# Patient Record
Sex: FEMALE | Race: WHITE | NOT HISPANIC OR LATINO
[De-identification: names, ages, dates, MRNs, and addresses within clinical notes are randomized per-mention and may not be internally consistent; named-entity substitution may affect disease eponyms.]

---

## 2017-04-24 ENCOUNTER — APPOINTMENT (OUTPATIENT)
Dept: PULMONOLOGY | Facility: CLINIC | Age: 72
End: 2017-04-24

## 2017-04-24 VITALS
SYSTOLIC BLOOD PRESSURE: 130 MMHG | DIASTOLIC BLOOD PRESSURE: 80 MMHG | OXYGEN SATURATION: 98 % | BODY MASS INDEX: 30.9 KG/M2 | HEART RATE: 68 BPM | WEIGHT: 180 LBS

## 2017-08-23 ENCOUNTER — APPOINTMENT (OUTPATIENT)
Dept: PULMONOLOGY | Facility: CLINIC | Age: 72
End: 2017-08-23
Payer: MEDICARE

## 2017-08-23 VITALS
OXYGEN SATURATION: 95 % | SYSTOLIC BLOOD PRESSURE: 130 MMHG | BODY MASS INDEX: 31.07 KG/M2 | WEIGHT: 182 LBS | HEART RATE: 74 BPM | HEIGHT: 64 IN | DIASTOLIC BLOOD PRESSURE: 70 MMHG

## 2017-08-23 PROCEDURE — 99214 OFFICE O/P EST MOD 30 MIN: CPT

## 2017-08-23 RX ORDER — HYDROCODONE BITARTRATE AND ACETAMINOPHEN 5; 325 MG/1; MG/1
5-325 TABLET ORAL
Qty: 90 | Refills: 0 | Status: DISCONTINUED | COMMUNITY
Start: 2017-02-28 | End: 2017-08-23

## 2017-08-23 RX ORDER — NYSTATIN 100000 [USP'U]/ML
100000 SUSPENSION ORAL 3 TIMES DAILY
Qty: 2 | Refills: 2 | Status: DISCONTINUED | COMMUNITY
Start: 2017-04-24 | End: 2017-08-23

## 2017-08-23 RX ORDER — LEVOTHYROXINE SODIUM 0.07 MG/1
75 TABLET ORAL
Qty: 30 | Refills: 0 | Status: DISCONTINUED | COMMUNITY
Start: 2016-12-13 | End: 2017-08-23

## 2017-10-03 ENCOUNTER — TRANSCRIPTION ENCOUNTER (OUTPATIENT)
Age: 72
End: 2017-10-03

## 2017-12-01 ENCOUNTER — EMERGENCY (EMERGENCY)
Facility: HOSPITAL | Age: 72
LOS: 1 days | Discharge: HOME | End: 2017-12-01
Attending: EMERGENCY MEDICINE
Payer: MEDICARE

## 2017-12-01 VITALS
TEMPERATURE: 98 F | SYSTOLIC BLOOD PRESSURE: 136 MMHG | WEIGHT: 173.94 LBS | RESPIRATION RATE: 18 BRPM | HEART RATE: 87 BPM | HEIGHT: 63 IN | DIASTOLIC BLOOD PRESSURE: 94 MMHG | OXYGEN SATURATION: 96 %

## 2017-12-01 VITALS
HEART RATE: 81 BPM | DIASTOLIC BLOOD PRESSURE: 74 MMHG | RESPIRATION RATE: 18 BRPM | SYSTOLIC BLOOD PRESSURE: 124 MMHG | OXYGEN SATURATION: 98 %

## 2017-12-01 LAB
ALBUMIN SERPL ELPH-MCNC: 4.3 G/DL — SIGNIFICANT CHANGE UP (ref 3.3–5.2)
ALP SERPL-CCNC: 72 U/L — SIGNIFICANT CHANGE UP (ref 40–120)
ALT FLD-CCNC: 9 U/L — SIGNIFICANT CHANGE UP
ANION GAP SERPL CALC-SCNC: 16 MMOL/L — SIGNIFICANT CHANGE UP (ref 5–17)
APTT BLD: 29.2 SEC — SIGNIFICANT CHANGE UP (ref 27.5–37.4)
AST SERPL-CCNC: 16 U/L — SIGNIFICANT CHANGE UP
BASOPHILS # BLD AUTO: 0 K/UL — SIGNIFICANT CHANGE UP (ref 0–0.2)
BASOPHILS # BLD AUTO: 0 K/UL — SIGNIFICANT CHANGE UP (ref 0–0.2)
BASOPHILS NFR BLD AUTO: 0.2 % — SIGNIFICANT CHANGE UP (ref 0–2)
BASOPHILS NFR BLD AUTO: 0.3 % — SIGNIFICANT CHANGE UP (ref 0–2)
BILIRUB SERPL-MCNC: 0.6 MG/DL — SIGNIFICANT CHANGE UP (ref 0.4–2)
BLD GP AB SCN SERPL QL: SIGNIFICANT CHANGE UP
BUN SERPL-MCNC: 9 MG/DL — SIGNIFICANT CHANGE UP (ref 8–20)
CALCIUM SERPL-MCNC: 9.6 MG/DL — SIGNIFICANT CHANGE UP (ref 8.6–10.2)
CHLORIDE SERPL-SCNC: 97 MMOL/L — LOW (ref 98–107)
CO2 SERPL-SCNC: 29 MMOL/L — SIGNIFICANT CHANGE UP (ref 22–29)
CREAT SERPL-MCNC: 0.68 MG/DL — SIGNIFICANT CHANGE UP (ref 0.5–1.3)
EOSINOPHIL # BLD AUTO: 0 K/UL — SIGNIFICANT CHANGE UP (ref 0–0.5)
EOSINOPHIL # BLD AUTO: 0 K/UL — SIGNIFICANT CHANGE UP (ref 0–0.5)
EOSINOPHIL NFR BLD AUTO: 0.6 % — SIGNIFICANT CHANGE UP (ref 0–6)
EOSINOPHIL NFR BLD AUTO: 0.8 % — SIGNIFICANT CHANGE UP (ref 0–6)
GLUCOSE SERPL-MCNC: 98 MG/DL — SIGNIFICANT CHANGE UP (ref 70–115)
HCT VFR BLD CALC: 38.4 % — SIGNIFICANT CHANGE UP (ref 37–47)
HCT VFR BLD CALC: 42.2 % — SIGNIFICANT CHANGE UP (ref 37–47)
HGB BLD-MCNC: 12.6 G/DL — SIGNIFICANT CHANGE UP (ref 12–16)
HGB BLD-MCNC: 13.8 G/DL — SIGNIFICANT CHANGE UP (ref 12–16)
INR BLD: 1.08 RATIO — SIGNIFICANT CHANGE UP (ref 0.88–1.16)
LYMPHOCYTES # BLD AUTO: 1.1 K/UL — SIGNIFICANT CHANGE UP (ref 1–4.8)
LYMPHOCYTES # BLD AUTO: 1.3 K/UL — SIGNIFICANT CHANGE UP (ref 1–4.8)
LYMPHOCYTES # BLD AUTO: 17.1 % — LOW (ref 20–55)
LYMPHOCYTES # BLD AUTO: 20.7 % — SIGNIFICANT CHANGE UP (ref 20–55)
MCHC RBC-ENTMCNC: 29.8 PG — SIGNIFICANT CHANGE UP (ref 27–31)
MCHC RBC-ENTMCNC: 29.9 PG — SIGNIFICANT CHANGE UP (ref 27–31)
MCHC RBC-ENTMCNC: 32.7 G/DL — SIGNIFICANT CHANGE UP (ref 32–36)
MCHC RBC-ENTMCNC: 32.8 G/DL — SIGNIFICANT CHANGE UP (ref 32–36)
MCV RBC AUTO: 90.8 FL — SIGNIFICANT CHANGE UP (ref 81–99)
MCV RBC AUTO: 91.5 FL — SIGNIFICANT CHANGE UP (ref 81–99)
MONOCYTES # BLD AUTO: 0.6 K/UL — SIGNIFICANT CHANGE UP (ref 0–0.8)
MONOCYTES # BLD AUTO: 0.8 K/UL — SIGNIFICANT CHANGE UP (ref 0–0.8)
MONOCYTES NFR BLD AUTO: 11.9 % — HIGH (ref 3–10)
MONOCYTES NFR BLD AUTO: 9.9 % — SIGNIFICANT CHANGE UP (ref 3–10)
NEUTROPHILS # BLD AUTO: 4.2 K/UL — SIGNIFICANT CHANGE UP (ref 1.8–8)
NEUTROPHILS # BLD AUTO: 4.6 K/UL — SIGNIFICANT CHANGE UP (ref 1.8–8)
NEUTROPHILS NFR BLD AUTO: 66.4 % — SIGNIFICANT CHANGE UP (ref 37–73)
NEUTROPHILS NFR BLD AUTO: 71.7 % — SIGNIFICANT CHANGE UP (ref 37–73)
OB PNL STL: POSITIVE
PLATELET # BLD AUTO: 132 K/UL — LOW (ref 150–400)
PLATELET # BLD AUTO: 153 K/UL — SIGNIFICANT CHANGE UP (ref 150–400)
POTASSIUM SERPL-MCNC: 3.4 MMOL/L — LOW (ref 3.5–5.3)
POTASSIUM SERPL-SCNC: 3.4 MMOL/L — LOW (ref 3.5–5.3)
PROT SERPL-MCNC: 7.4 G/DL — SIGNIFICANT CHANGE UP (ref 6.6–8.7)
PROTHROM AB SERPL-ACNC: 11.9 SEC — SIGNIFICANT CHANGE UP (ref 9.8–12.7)
RBC # BLD: 4.23 M/UL — LOW (ref 4.4–5.2)
RBC # BLD: 4.61 M/UL — SIGNIFICANT CHANGE UP (ref 4.4–5.2)
RBC # FLD: 14.3 % — SIGNIFICANT CHANGE UP (ref 11–15.6)
RBC # FLD: 14.4 % — SIGNIFICANT CHANGE UP (ref 11–15.6)
SODIUM SERPL-SCNC: 142 MMOL/L — SIGNIFICANT CHANGE UP (ref 135–145)
TYPE + AB SCN PNL BLD: SIGNIFICANT CHANGE UP
WBC # BLD: 6.4 K/UL — SIGNIFICANT CHANGE UP (ref 4.8–10.8)
WBC # BLD: 6.5 K/UL — SIGNIFICANT CHANGE UP (ref 4.8–10.8)
WBC # FLD AUTO: 6.4 K/UL — SIGNIFICANT CHANGE UP (ref 4.8–10.8)
WBC # FLD AUTO: 6.5 K/UL — SIGNIFICANT CHANGE UP (ref 4.8–10.8)

## 2017-12-01 PROCEDURE — 99284 EMERGENCY DEPT VISIT MOD MDM: CPT

## 2017-12-01 PROCEDURE — 82272 OCCULT BLD FECES 1-3 TESTS: CPT

## 2017-12-01 PROCEDURE — 80053 COMPREHEN METABOLIC PANEL: CPT

## 2017-12-01 PROCEDURE — 86900 BLOOD TYPING SEROLOGIC ABO: CPT

## 2017-12-01 PROCEDURE — 99284 EMERGENCY DEPT VISIT MOD MDM: CPT | Mod: 25

## 2017-12-01 PROCEDURE — 74022 RADEX COMPL AQT ABD SERIES: CPT

## 2017-12-01 PROCEDURE — 85730 THROMBOPLASTIN TIME PARTIAL: CPT

## 2017-12-01 PROCEDURE — 85027 COMPLETE CBC AUTOMATED: CPT

## 2017-12-01 PROCEDURE — 74177 CT ABD & PELVIS W/CONTRAST: CPT

## 2017-12-01 PROCEDURE — 36415 COLL VENOUS BLD VENIPUNCTURE: CPT

## 2017-12-01 PROCEDURE — 74022 RADEX COMPL AQT ABD SERIES: CPT | Mod: 26

## 2017-12-01 PROCEDURE — 96374 THER/PROPH/DIAG INJ IV PUSH: CPT | Mod: XU

## 2017-12-01 PROCEDURE — 85610 PROTHROMBIN TIME: CPT

## 2017-12-01 PROCEDURE — 86901 BLOOD TYPING SEROLOGIC RH(D): CPT

## 2017-12-01 PROCEDURE — 74177 CT ABD & PELVIS W/CONTRAST: CPT | Mod: 26

## 2017-12-01 PROCEDURE — 86850 RBC ANTIBODY SCREEN: CPT

## 2017-12-01 RX ORDER — SODIUM CHLORIDE 9 MG/ML
1000 INJECTION INTRAMUSCULAR; INTRAVENOUS; SUBCUTANEOUS ONCE
Qty: 0 | Refills: 0 | Status: COMPLETED | OUTPATIENT
Start: 2017-12-01 | End: 2017-12-01

## 2017-12-01 RX ORDER — ONDANSETRON 8 MG/1
4 TABLET, FILM COATED ORAL ONCE
Qty: 0 | Refills: 0 | Status: COMPLETED | OUTPATIENT
Start: 2017-12-01 | End: 2017-12-01

## 2017-12-01 RX ORDER — ONDANSETRON 8 MG/1
1 TABLET, FILM COATED ORAL
Qty: 15 | Refills: 0 | OUTPATIENT
Start: 2017-12-01 | End: 2017-12-06

## 2017-12-01 RX ADMIN — ONDANSETRON 4 MILLIGRAM(S): 8 TABLET, FILM COATED ORAL at 14:34

## 2017-12-01 RX ADMIN — SODIUM CHLORIDE 1000 MILLILITER(S): 9 INJECTION INTRAMUSCULAR; INTRAVENOUS; SUBCUTANEOUS at 14:34

## 2017-12-01 NOTE — CONSULT NOTE ADULT - SUBJECTIVE AND OBJECTIVE BOX
73 y/o F with 2 day h/o of persistent worsening b/l lower abd pain with multiple episodes vomiting with traces of blood. Pt is unable to tolerate PO intake, last BM was yesterday and family noted red streaks of blood, went to [facility] with +hemeoccult and referred to [facility] for further work up. Pt denies having previous symptoms, but states having 2c, umbilical hernia for many decades which has been asymptomatic, unchanged in size and always reducible. Denies f/c. Voiding and ambulating independently. Last colonoscopy was over 8years ago and was reported to be normal. 71 y/o F with 2 day h/o of persistent worsening b/l lower abd pain with multiple episodes vomiting with traces of blood. Pt is unable to tolerate PO intake, last BM was yesterday and family noted red streaks of blood, [facility]cult and referred to [facility] for further work up. Pt denies having previous symptoms, but states having 2c, umbilical hernia for many decades which has been asymptomatic, unchanged in size and always reducible. Denies f/c. Voiding and ambulating independently. Last colonoscopy was over 8years ago and was reported to be normal.     PMHx: COPD, HTN, HLD, Hypothyroidism, Endometriosis, RLE neuropathic pain, Osteoporosis. PMD Dr. Marcel Rivera, Pulmonlogist Dr. Machado, GYN Dr. Madrid, Neurology Dr. Connie Arteaga  PSHx: open appendectomy with OOphorectomy, Total abdominal hysterectomy, salpingoophorectomy, Csection  Medications: Gabapentin, Percocet, Synthroid, Atorvastatin, VitD, Symbicort, Spiriva, ProAir, Tizanidine  NKDA    Vital Signs Last 24 Hrs  T(C): 36.7 (01 Dec 2017 12:30), Max: 36.7 (01 Dec 2017 12:30)  T(F): 98 (01 Dec 2017 12:30), Max: 98 (01 Dec 2017 12:30)  HR: 82 (01 Dec 2017 18:17) (82 - 87)  BP: 137/77 (01 Dec 2017 18:17) (136/94 - 137/77)  BP(mean): --  RR: 18 (01 Dec 2017 18:17) (18 - 18)  SpO2: 98% (01 Dec 2017 18:17) (96% - 98%)    NAD, AAOX3  Head NCAT, EOMI  Chest expansion symmetric, Lungs CTAB  RRR, S1S2nl  Abd soft, ND, mild lower abd TTP without rebound/rigidity/guarding, 2cm umbilical hernia reducible/NTTP/no skin changes  Ext: No swelling/Edema    CBC Full  -  ( 01 Dec 2017 18:36 )  WBC Count : 6.4 K/uL  Hemoglobin : 12.6 g/dL  Hematocrit : 38.4 %  Platelet Count - Automated : 132 K/uL  Mean Cell Volume : 90.8 fl  Mean Cell Hemoglobin : 29.8 pg  Mean Cell Hemoglobin Concentration : 32.8 g/dL  Auto Neutrophil # : 4.2 K/uL  Auto Lymphocyte # : 1.3 K/uL  Auto Monocyte # : 0.8 K/uL  Auto Eosinophil # : 0.0 K/uL  Auto Basophil # : 0.0 K/uL  Auto Neutrophil % : 66.4 %  Auto Lymphocyte % : 20.7 %  Auto Monocyte % : 11.9 %  Auto Eosinophil % : 0.6 %  Auto Basophil % : 0.2 %    12-01    142  |  97<L>  |  9.0  ----------------------------<  98  3.4<L>   |  29.0  |  0.68    Ca    9.6      01 Dec 2017 15:02    TPro  7.4  /  Alb  4.3  /  TBili  0.6  /  DBili  x   /  AST  16  /  ALT  9   /  AlkPhos  72  12-01    CT A/P: 2cm umbilical hernia with preperitoneal fat, no bowel contained or wall thickening

## 2017-12-01 NOTE — ED STATDOCS - PROGRESS NOTE DETAILS
73 yo F, with hx of COPD, presents to ED from [facility] c/o vomiting x 3 days associated with episodes of hematemesis, low grade fevers, bloating, diarrhea. Pt tested guaiac positive at [facility] and was sent to ED. PSHx includes hysterectomy, , appendectomy, left oophorectomy. PE: diminished breath sounds bilaterally, RRR, no CVAT, abdomen is non distended and dull to percussion, mild LLQ tenderness, no rebound, no rigidity, no guarding. Focused eval, protocol orders entered. Pt to be moved to main ED for complete evaluation by another provider.

## 2017-12-01 NOTE — ED PROVIDER NOTE - PMH
No pertinent past medical history Chronic knee pain    COPD (chronic obstructive pulmonary disease)    Hyperlipidemia    Hypothyroid

## 2017-12-01 NOTE — CONSULT NOTE ADULT - ASSESSMENT
71 y/o F with 2 day h/o of abd pain with BRBPR, enteritis/colits vs occult LGIB of unknown origin  Hemodynamically nl,   -No surgical intervention  -Recommend medicine admission for IV hydration and PO intolerance  -Recommend GI evaluation for GI bleed work up  -Recommend q6hhb  -please contact surgery if pt clinically worsens, hemodynamically compromised, suspicious for acute abdomen 73 y/o F with 2 day h/o of abd pain with BRBPR, enteritis/colits vs occult LGIB of unknown origin, nonincarcerated asymptomatic umbilical hernia  Hemodynamically nl,  -No surgical intervention  -Recommend medicine admission for IV hydration and PO intolerance  -Recommend GI evaluation for GI bleed work up  -Recommend serial hb   -please contact surgery if pt clinically worsens, hemodynamically compromised, suspicious for acute abdomen

## 2017-12-01 NOTE — ED ADULT NURSE NOTE - OBJECTIVE STATEMENT
Assumed patient care at 1430.  PT is awake alert and oriented with family at bedside.  Admits to vomiting and diarrhea (dark red in color), since Tuesday and unable to hold down any food.

## 2017-12-01 NOTE — ED PROVIDER NOTE - MEDICAL DECISION MAKING DETAILS
Pt with no hematemesis or bloody BMs since yesterday. Brown stool. VSS. no coagulopathy. Hgb stable and slight decreased likely due to liquids. Abd soft. Surgery has cleared from hernia perspective. Discussed case with GI Dr. lundberg, who agrees that pt is stable for dc and follw up in ffice early next week. Pt well appearing. pt reliable and gave strict return instructions to return for repeated episodes of bloody emesis or BMs. Pt tolerated PO

## 2017-12-01 NOTE — ED PROVIDER NOTE - OBJECTIVE STATEMENT
72 year old female with Ph COPD, HTn, HLD presents with abd pain and bloody BMs. Pt states that the Sx started 3 days ago with b/l lower abdominal bloating sensation. She reports severe nausea and multiple episodes of vomiting brought on by PO intake. She states that 2 of her episodes of vomiting were bloody. In addition, she reports multiple bloody BMs over th course of 2 days, non since 11/29. She denies melena, fevers, chills, dysuria, hematuria, chest pain, SOB. 72 year old female with Ph COPD, HTn, HLD presents with abd pain and bloody BMs. Pt states that the Sx started 3 days ago with b/l lower abdominal bloating sensation. She reports severe nausea and multiple episodes of vomiting brought on by PO intake. She states that 2 of her episodes of vomiting were bloody, one of which . In addition, she reports multiple blood-streaked BMs over th course of 2 days, none since 11/29. She denies melena, fevers, chills, dysuria, hematuria, chest pain, SOB.

## 2017-12-07 ENCOUNTER — APPOINTMENT (OUTPATIENT)
Dept: GASTROENTEROLOGY | Facility: CLINIC | Age: 72
End: 2017-12-07
Payer: MEDICARE

## 2017-12-07 VITALS
WEIGHT: 180 LBS | RESPIRATION RATE: 16 BRPM | HEIGHT: 64 IN | HEART RATE: 78 BPM | DIASTOLIC BLOOD PRESSURE: 83 MMHG | BODY MASS INDEX: 30.73 KG/M2 | SYSTOLIC BLOOD PRESSURE: 131 MMHG

## 2017-12-07 DIAGNOSIS — J44.9 CHRONIC OBSTRUCTIVE PULMONARY DISEASE, UNSPECIFIED: ICD-10-CM

## 2017-12-07 PROCEDURE — 99204 OFFICE O/P NEW MOD 45 MIN: CPT

## 2017-12-16 ENCOUNTER — RX RENEWAL (OUTPATIENT)
Age: 72
End: 2017-12-16

## 2018-01-17 ENCOUNTER — APPOINTMENT (OUTPATIENT)
Dept: PULMONOLOGY | Facility: CLINIC | Age: 73
End: 2018-01-17

## 2018-03-01 ENCOUNTER — APPOINTMENT (OUTPATIENT)
Dept: PULMONOLOGY | Facility: CLINIC | Age: 73
End: 2018-03-01
Payer: MEDICARE

## 2018-03-01 VITALS
DIASTOLIC BLOOD PRESSURE: 80 MMHG | OXYGEN SATURATION: 97 % | SYSTOLIC BLOOD PRESSURE: 130 MMHG | BODY MASS INDEX: 31.58 KG/M2 | HEIGHT: 64 IN | HEART RATE: 82 BPM

## 2018-03-01 VITALS — WEIGHT: 184 LBS | BODY MASS INDEX: 31.58 KG/M2

## 2018-03-01 DIAGNOSIS — Z01.811 ENCOUNTER FOR PREPROCEDURAL RESPIRATORY EXAMINATION: ICD-10-CM

## 2018-03-01 PROCEDURE — 94010 BREATHING CAPACITY TEST: CPT

## 2018-03-01 PROCEDURE — 99214 OFFICE O/P EST MOD 30 MIN: CPT | Mod: 25

## 2018-03-01 RX ORDER — BUDESONIDE AND FORMOTEROL FUMARATE DIHYDRATE 160; 4.5 UG/1; UG/1
160-4.5 AEROSOL RESPIRATORY (INHALATION) TWICE DAILY
Qty: 1 | Refills: 0 | Status: DISCONTINUED | COMMUNITY
Start: 2017-08-23 | End: 2018-03-01

## 2018-04-16 ENCOUNTER — APPOINTMENT (OUTPATIENT)
Dept: GASTROENTEROLOGY | Facility: GI CENTER | Age: 73
End: 2018-04-16
Payer: MEDICARE

## 2018-04-16 ENCOUNTER — OUTPATIENT (OUTPATIENT)
Dept: OUTPATIENT SERVICES | Facility: HOSPITAL | Age: 73
LOS: 1 days | End: 2018-04-16
Payer: MEDICARE

## 2018-04-16 ENCOUNTER — RESULT REVIEW (OUTPATIENT)
Age: 73
End: 2018-04-16

## 2018-04-16 VITALS
SYSTOLIC BLOOD PRESSURE: 130 MMHG | HEIGHT: 64 IN | TEMPERATURE: 98 F | DIASTOLIC BLOOD PRESSURE: 80 MMHG | BODY MASS INDEX: 31.41 KG/M2 | HEART RATE: 80 BPM | RESPIRATION RATE: 12 BRPM | WEIGHT: 184 LBS

## 2018-04-16 DIAGNOSIS — D17.9 BENIGN LIPOMATOUS NEOPLASM, UNSPECIFIED: ICD-10-CM

## 2018-04-16 DIAGNOSIS — K64.8 OTHER HEMORRHOIDS: ICD-10-CM

## 2018-04-16 DIAGNOSIS — K62.5 HEMORRHAGE OF ANUS AND RECTUM: ICD-10-CM

## 2018-04-16 PROCEDURE — 45380 COLONOSCOPY AND BIOPSY: CPT

## 2018-04-16 PROCEDURE — 88305 TISSUE EXAM BY PATHOLOGIST: CPT | Mod: 26

## 2018-04-16 PROCEDURE — 88305 TISSUE EXAM BY PATHOLOGIST: CPT

## 2018-04-17 ENCOUNTER — RESULT REVIEW (OUTPATIENT)
Age: 73
End: 2018-04-17

## 2018-04-18 LAB — SURGICAL PATHOLOGY FINAL REPORT - CH: SIGNIFICANT CHANGE UP

## 2018-07-27 ENCOUNTER — RESULT REVIEW (OUTPATIENT)
Age: 73
End: 2018-07-27

## 2018-07-27 DIAGNOSIS — Z03.89 ENCOUNTER FOR OBSERVATION FOR OTHER SUSPECTED DISEASES AND CONDITIONS RULED OUT: ICD-10-CM

## 2018-07-31 PROBLEM — E78.5 HYPERLIPIDEMIA, UNSPECIFIED: Chronic | Status: ACTIVE | Noted: 2017-12-01

## 2018-07-31 PROBLEM — J44.9 CHRONIC OBSTRUCTIVE PULMONARY DISEASE, UNSPECIFIED: Chronic | Status: ACTIVE | Noted: 2017-12-01

## 2018-07-31 PROBLEM — M25.569 PAIN IN UNSPECIFIED KNEE: Chronic | Status: ACTIVE | Noted: 2017-12-01

## 2018-07-31 PROBLEM — E03.9 HYPOTHYROIDISM, UNSPECIFIED: Chronic | Status: ACTIVE | Noted: 2017-12-01

## 2018-08-01 ENCOUNTER — FORM ENCOUNTER (OUTPATIENT)
Age: 73
End: 2018-08-01

## 2018-08-02 ENCOUNTER — APPOINTMENT (OUTPATIENT)
Dept: NUCLEAR MEDICINE | Facility: CLINIC | Age: 73
End: 2018-08-02
Payer: MEDICARE

## 2018-08-02 ENCOUNTER — OUTPATIENT (OUTPATIENT)
Dept: OUTPATIENT SERVICES | Facility: HOSPITAL | Age: 73
LOS: 1 days | End: 2018-08-02

## 2018-08-02 DIAGNOSIS — Z03.89 ENCOUNTER FOR OBSERVATION FOR OTHER SUSPECTED DISEASES AND CONDITIONS RULED OUT: ICD-10-CM

## 2018-08-02 DIAGNOSIS — R91.1 SOLITARY PULMONARY NODULE: ICD-10-CM

## 2018-08-02 PROCEDURE — 78815 PET IMAGE W/CT SKULL-THIGH: CPT | Mod: 26,PI

## 2018-08-06 ENCOUNTER — APPOINTMENT (OUTPATIENT)
Dept: THORACIC SURGERY | Facility: CLINIC | Age: 73
End: 2018-08-06
Payer: MEDICARE

## 2018-08-06 VITALS
OXYGEN SATURATION: 94 % | HEIGHT: 64 IN | RESPIRATION RATE: 16 BRPM | SYSTOLIC BLOOD PRESSURE: 135 MMHG | WEIGHT: 184 LBS | HEART RATE: 88 BPM | BODY MASS INDEX: 31.41 KG/M2 | DIASTOLIC BLOOD PRESSURE: 81 MMHG

## 2018-08-06 PROCEDURE — 99205 OFFICE O/P NEW HI 60 MIN: CPT

## 2018-08-06 RX ORDER — SULFAMETHOXAZOLE AND TRIMETHOPRIM 800; 160 MG/1; MG/1
800-160 TABLET ORAL
Qty: 20 | Refills: 0 | Status: DISCONTINUED | COMMUNITY
Start: 2018-06-04

## 2018-08-06 RX ORDER — MUPIROCIN 20 MG/G
2 OINTMENT TOPICAL
Qty: 66 | Refills: 0 | Status: DISCONTINUED | COMMUNITY
Start: 2018-06-04

## 2018-08-06 RX ORDER — FLUTICASONE FUROATE AND VILANTEROL TRIFENATATE 200; 25 UG/1; UG/1
200-25 POWDER RESPIRATORY (INHALATION) DAILY
Qty: 1 | Refills: 5 | Status: DISCONTINUED | COMMUNITY
Start: 2017-04-24 | End: 2018-08-06

## 2018-08-06 RX ORDER — TIZANIDINE 2 MG/1
2 TABLET ORAL
Qty: 60 | Refills: 0 | Status: DISCONTINUED | COMMUNITY
Start: 2018-04-25

## 2018-08-24 ENCOUNTER — OUTPATIENT (OUTPATIENT)
Dept: OUTPATIENT SERVICES | Facility: HOSPITAL | Age: 73
LOS: 1 days | End: 2018-08-24
Payer: MEDICARE

## 2018-08-24 VITALS
RESPIRATION RATE: 16 BRPM | TEMPERATURE: 98 F | HEIGHT: 63 IN | WEIGHT: 176.37 LBS | DIASTOLIC BLOOD PRESSURE: 74 MMHG | SYSTOLIC BLOOD PRESSURE: 130 MMHG | HEART RATE: 86 BPM

## 2018-08-24 DIAGNOSIS — Z29.9 ENCOUNTER FOR PROPHYLACTIC MEASURES, UNSPECIFIED: ICD-10-CM

## 2018-08-24 DIAGNOSIS — Z98.891 HISTORY OF UTERINE SCAR FROM PREVIOUS SURGERY: Chronic | ICD-10-CM

## 2018-08-24 DIAGNOSIS — Z01.811 ENCOUNTER FOR PREPROCEDURAL RESPIRATORY EXAMINATION: ICD-10-CM

## 2018-08-24 DIAGNOSIS — J44.9 CHRONIC OBSTRUCTIVE PULMONARY DISEASE, UNSPECIFIED: ICD-10-CM

## 2018-08-24 DIAGNOSIS — Z90.710 ACQUIRED ABSENCE OF BOTH CERVIX AND UTERUS: Chronic | ICD-10-CM

## 2018-08-24 DIAGNOSIS — R91.1 SOLITARY PULMONARY NODULE: ICD-10-CM

## 2018-08-24 LAB
ALBUMIN SERPL ELPH-MCNC: 4.7 G/DL — SIGNIFICANT CHANGE UP (ref 3.3–5.2)
ALP SERPL-CCNC: 79 U/L — SIGNIFICANT CHANGE UP (ref 40–120)
ALT FLD-CCNC: 10 U/L — SIGNIFICANT CHANGE UP
ANION GAP SERPL CALC-SCNC: 14 MMOL/L — SIGNIFICANT CHANGE UP (ref 5–17)
APTT BLD: 30 SEC — SIGNIFICANT CHANGE UP (ref 27.5–37.4)
AST SERPL-CCNC: 14 U/L — SIGNIFICANT CHANGE UP
BASOPHILS # BLD AUTO: 0 K/UL — SIGNIFICANT CHANGE UP (ref 0–0.2)
BASOPHILS NFR BLD AUTO: 0.1 % — SIGNIFICANT CHANGE UP (ref 0–2)
BILIRUB SERPL-MCNC: 0.4 MG/DL — SIGNIFICANT CHANGE UP (ref 0.4–2)
BLD GP AB SCN SERPL QL: SIGNIFICANT CHANGE UP
BUN SERPL-MCNC: 10 MG/DL — SIGNIFICANT CHANGE UP (ref 8–20)
CALCIUM SERPL-MCNC: 9.9 MG/DL — SIGNIFICANT CHANGE UP (ref 8.6–10.2)
CHLORIDE SERPL-SCNC: 99 MMOL/L — SIGNIFICANT CHANGE UP (ref 98–107)
CO2 SERPL-SCNC: 28 MMOL/L — SIGNIFICANT CHANGE UP (ref 22–29)
CREAT SERPL-MCNC: 0.75 MG/DL — SIGNIFICANT CHANGE UP (ref 0.5–1.3)
EOSINOPHIL # BLD AUTO: 0.2 K/UL — SIGNIFICANT CHANGE UP (ref 0–0.5)
EOSINOPHIL NFR BLD AUTO: 2.3 % — SIGNIFICANT CHANGE UP (ref 0–6)
GLUCOSE SERPL-MCNC: 113 MG/DL — SIGNIFICANT CHANGE UP (ref 70–115)
HCT VFR BLD CALC: 41.2 % — SIGNIFICANT CHANGE UP (ref 37–47)
HGB BLD-MCNC: 13.3 G/DL — SIGNIFICANT CHANGE UP (ref 12–16)
INR BLD: 1.02 RATIO — SIGNIFICANT CHANGE UP (ref 0.88–1.16)
LYMPHOCYTES # BLD AUTO: 1.1 K/UL — SIGNIFICANT CHANGE UP (ref 1–4.8)
LYMPHOCYTES # BLD AUTO: 16.4 % — LOW (ref 20–55)
MCHC RBC-ENTMCNC: 29.8 PG — SIGNIFICANT CHANGE UP (ref 27–31)
MCHC RBC-ENTMCNC: 32.3 G/DL — SIGNIFICANT CHANGE UP (ref 32–36)
MCV RBC AUTO: 92.2 FL — SIGNIFICANT CHANGE UP (ref 81–99)
MONOCYTES # BLD AUTO: 0.4 K/UL — SIGNIFICANT CHANGE UP (ref 0–0.8)
MONOCYTES NFR BLD AUTO: 6.3 % — SIGNIFICANT CHANGE UP (ref 3–10)
NEUTROPHILS # BLD AUTO: 5.2 K/UL — SIGNIFICANT CHANGE UP (ref 1.8–8)
NEUTROPHILS NFR BLD AUTO: 74.6 % — HIGH (ref 37–73)
PLATELET # BLD AUTO: 168 K/UL — SIGNIFICANT CHANGE UP (ref 150–400)
POTASSIUM SERPL-MCNC: 4.5 MMOL/L — SIGNIFICANT CHANGE UP (ref 3.5–5.3)
POTASSIUM SERPL-SCNC: 4.5 MMOL/L — SIGNIFICANT CHANGE UP (ref 3.5–5.3)
PROT SERPL-MCNC: 7.5 G/DL — SIGNIFICANT CHANGE UP (ref 6.6–8.7)
PROTHROM AB SERPL-ACNC: 11.2 SEC — SIGNIFICANT CHANGE UP (ref 9.8–12.7)
RBC # BLD: 4.47 M/UL — SIGNIFICANT CHANGE UP (ref 4.4–5.2)
RBC # FLD: 14.5 % — SIGNIFICANT CHANGE UP (ref 11–15.6)
SODIUM SERPL-SCNC: 141 MMOL/L — SIGNIFICANT CHANGE UP (ref 135–145)
TYPE + AB SCN PNL BLD: SIGNIFICANT CHANGE UP
WBC # BLD: 7 K/UL — SIGNIFICANT CHANGE UP (ref 4.8–10.8)
WBC # FLD AUTO: 7 K/UL — SIGNIFICANT CHANGE UP (ref 4.8–10.8)

## 2018-08-24 PROCEDURE — 93010 ELECTROCARDIOGRAM REPORT: CPT

## 2018-08-24 RX ORDER — CEFAZOLIN SODIUM 1 G
2000 VIAL (EA) INJECTION ONCE
Qty: 0 | Refills: 0 | Status: DISCONTINUED | OUTPATIENT
Start: 2018-09-07 | End: 2018-09-08

## 2018-08-24 RX ORDER — TIZANIDINE 4 MG/1
0 TABLET ORAL
Qty: 0 | Refills: 0 | COMMUNITY

## 2018-08-24 RX ORDER — LEVOTHYROXINE SODIUM 125 MCG
0 TABLET ORAL
Qty: 0 | Refills: 0 | COMMUNITY

## 2018-08-24 RX ORDER — TIOTROPIUM BROMIDE 18 UG/1
0 CAPSULE ORAL; RESPIRATORY (INHALATION)
Qty: 0 | Refills: 0 | COMMUNITY

## 2018-08-24 RX ORDER — BUDESONIDE AND FORMOTEROL FUMARATE DIHYDRATE 160; 4.5 UG/1; UG/1
0 AEROSOL RESPIRATORY (INHALATION)
Qty: 0 | Refills: 0 | COMMUNITY

## 2018-08-24 RX ORDER — GABAPENTIN 400 MG/1
0 CAPSULE ORAL
Qty: 0 | Refills: 0 | COMMUNITY

## 2018-08-24 RX ORDER — ATORVASTATIN CALCIUM 80 MG/1
0 TABLET, FILM COATED ORAL
Qty: 0 | Refills: 0 | COMMUNITY

## 2018-08-24 NOTE — H&P PST ADULT - NSANTHOSAYNRD_GEN_A_CORE
No. JORDON screening performed.  STOP BANG Legend: 0-2 = LOW Risk; 3-4 = INTERMEDIATE Risk; 5-8 = HIGH Risk

## 2018-08-24 NOTE — H&P PST ADULT - ASSESSMENT
73 yo female right lung nodule.    CAPRINI SCORE [CLOT]    AGE RELATED RISK FACTORS                                                       MOBILITY RELATED FACTORS  [ ] Age 41-60 years                                            (1 Point)                  [ ] Bed rest                                                        (1 Point)  [ x ] Age: 61-74 years                                           (2 Points)                 [ ] Plaster cast                                                   (2 Points)  [ ] Age= 75 years                                              (3 Points)                 [ ] Bed bound for more than 72 hours                 (2 Points)    DISEASE RELATED RISK FACTORS                                               GENDER SPECIFIC FACTORS  [ ] Edema in the lower extremities                       (1 Point)                  [ ] Pregnancy                                                     (1 Point)  [ ] Varicose veins                                               (1 Point)                  [ ] Post-partum < 6 weeks                                   (1 Point)             [1 ] BMI > 25 Kg/m2                                            (1 Point)                  [ ] Hormonal therapy  or oral contraception          (1 Point)                 [ ] Sepsis (in the previous month)                        (1 Point)                  [ ] History of pregnancy complications                 (1 point)  [ ] Pneumonia or serious lung disease                                               [ ] Unexplained or recurrent                     (1 Point)           (in the previous month)                               (1 Point)  [ x ] Abnormal pulmonary function test                     (1 Point)                 SURGERY RELATED RISK FACTORS  [ ] Acute myocardial infarction                              (1 Point)                 [ ]  Section                                             (1 Point)  [ ] Congestive heart failure (in the previous month)  (1 Point)               [ ] Minor surgery                                                  (1 Point)   [ ] Inflammatory bowel disease                             (1 Point)                 [ ] Arthroscopic surgery                                        (2 Points)  [ ] Central venous access                                      (2 Points)                [ x ] General surgery lasting more than 45 minutes   (2 Points)       [ ] Stroke (in the previous month)                          (5 Points)               [ ] Elective arthroplasty                                         (5 Points)                                                                                                                                               HEMATOLOGY RELATED FACTORS                                                 TRAUMA RELATED RISK FACTORS  [ ] Prior episodes of VTE                                     (3 Points)                 [ ] Fracture of the hip, pelvis, or leg                       (5 Points)  [ ] Positive family history for VTE                         (3 Points)                 [ ] Acute spinal cord injury (in the previous month)  (5 Points)  [ ] Prothrombin 44309 A                                     (3 Points)                 [ ] Paralysis  (less than 1 month)                             (5 Points)  [ ] Factor V Leiden                                             (3 Points)                  [ ] Multiple Trauma within 1 month                        (5 Points)  [ ] Lupus anticoagulants                                     (3 Points)                                                           [ ] Anticardiolipin antibodies                               (3 Points)                                                       [ ] High homocysteine in the blood                      (3 Points)                                             [ ] Other congenital or acquired thrombophilia      (3 Points)                                                [ ] Heparin induced thrombocytopenia                  (3 Points)                                          Total Score [  5     ]

## 2018-08-28 ENCOUNTER — RX RENEWAL (OUTPATIENT)
Age: 73
End: 2018-08-28

## 2018-08-28 ENCOUNTER — APPOINTMENT (OUTPATIENT)
Dept: CARDIOLOGY | Facility: CLINIC | Age: 73
End: 2018-08-28
Payer: MEDICARE

## 2018-08-28 ENCOUNTER — NON-APPOINTMENT (OUTPATIENT)
Age: 73
End: 2018-08-28

## 2018-08-28 VITALS
WEIGHT: 178 LBS | HEART RATE: 75 BPM | HEIGHT: 64 IN | DIASTOLIC BLOOD PRESSURE: 76 MMHG | BODY MASS INDEX: 30.39 KG/M2 | SYSTOLIC BLOOD PRESSURE: 124 MMHG | OXYGEN SATURATION: 90 %

## 2018-08-28 DIAGNOSIS — Z01.810 ENCOUNTER FOR PREPROCEDURAL CARDIOVASCULAR EXAMINATION: ICD-10-CM

## 2018-08-28 PROCEDURE — 93306 TTE W/DOPPLER COMPLETE: CPT

## 2018-08-28 PROCEDURE — 99205 OFFICE O/P NEW HI 60 MIN: CPT

## 2018-08-28 PROCEDURE — 93000 ELECTROCARDIOGRAM COMPLETE: CPT

## 2018-08-28 RX ORDER — HYDROCORTISONE ACETATE 25 MG/1
25 SUPPOSITORY RECTAL
Qty: 12 | Refills: 5 | Status: DISCONTINUED | COMMUNITY
Start: 2018-04-16 | End: 2018-08-28

## 2018-09-06 ENCOUNTER — APPOINTMENT (OUTPATIENT)
Dept: PULMONOLOGY | Facility: CLINIC | Age: 73
End: 2018-09-06

## 2018-09-06 ENCOUNTER — TRANSCRIPTION ENCOUNTER (OUTPATIENT)
Age: 73
End: 2018-09-06

## 2018-09-07 ENCOUNTER — INPATIENT (INPATIENT)
Facility: HOSPITAL | Age: 73
LOS: 0 days | Discharge: HOME | DRG: 165 | End: 2018-09-08
Attending: THORACIC SURGERY (CARDIOTHORACIC VASCULAR SURGERY) | Admitting: THORACIC SURGERY (CARDIOTHORACIC VASCULAR SURGERY)
Payer: MEDICARE

## 2018-09-07 ENCOUNTER — RESULT REVIEW (OUTPATIENT)
Age: 73
End: 2018-09-07

## 2018-09-07 ENCOUNTER — APPOINTMENT (OUTPATIENT)
Dept: THORACIC SURGERY | Facility: HOSPITAL | Age: 73
End: 2018-09-07

## 2018-09-07 VITALS
HEART RATE: 70 BPM | RESPIRATION RATE: 16 BRPM | SYSTOLIC BLOOD PRESSURE: 152 MMHG | HEIGHT: 63 IN | OXYGEN SATURATION: 97 % | TEMPERATURE: 98 F | WEIGHT: 182.1 LBS | DIASTOLIC BLOOD PRESSURE: 77 MMHG

## 2018-09-07 DIAGNOSIS — Z98.891 HISTORY OF UTERINE SCAR FROM PREVIOUS SURGERY: Chronic | ICD-10-CM

## 2018-09-07 DIAGNOSIS — Z90.710 ACQUIRED ABSENCE OF BOTH CERVIX AND UTERUS: Chronic | ICD-10-CM

## 2018-09-07 DIAGNOSIS — R91.1 SOLITARY PULMONARY NODULE: ICD-10-CM

## 2018-09-07 PROCEDURE — 32669 THORACOSCOPY REMOVE SEGMENT: CPT

## 2018-09-07 PROCEDURE — 85730 THROMBOPLASTIN TIME PARTIAL: CPT

## 2018-09-07 PROCEDURE — 80053 COMPREHEN METABOLIC PANEL: CPT

## 2018-09-07 PROCEDURE — 86923 COMPATIBILITY TEST ELECTRIC: CPT

## 2018-09-07 PROCEDURE — 32674 THORACOSCOPY LYMPH NODE EXC: CPT

## 2018-09-07 PROCEDURE — 86901 BLOOD TYPING SEROLOGIC RH(D): CPT

## 2018-09-07 PROCEDURE — 88342 IMHCHEM/IMCYTCHM 1ST ANTB: CPT | Mod: 26

## 2018-09-07 PROCEDURE — 86900 BLOOD TYPING SEROLOGIC ABO: CPT

## 2018-09-07 PROCEDURE — G0463: CPT

## 2018-09-07 PROCEDURE — 31622 DX BRONCHOSCOPE/WASH: CPT

## 2018-09-07 PROCEDURE — 88307 TISSUE EXAM BY PATHOLOGIST: CPT | Mod: 26

## 2018-09-07 PROCEDURE — 88331 PATH CONSLTJ SURG 1 BLK 1SPC: CPT | Mod: 26

## 2018-09-07 PROCEDURE — 88341 IMHCHEM/IMCYTCHM EA ADD ANTB: CPT | Mod: 26

## 2018-09-07 PROCEDURE — 36415 COLL VENOUS BLD VENIPUNCTURE: CPT

## 2018-09-07 PROCEDURE — 88305 TISSUE EXAM BY PATHOLOGIST: CPT | Mod: 26

## 2018-09-07 PROCEDURE — 85027 COMPLETE CBC AUTOMATED: CPT

## 2018-09-07 PROCEDURE — 71045 X-RAY EXAM CHEST 1 VIEW: CPT | Mod: 26

## 2018-09-07 PROCEDURE — 85610 PROTHROMBIN TIME: CPT

## 2018-09-07 PROCEDURE — 86850 RBC ANTIBODY SCREEN: CPT

## 2018-09-07 RX ORDER — MUPIROCIN 20 MG/G
1 OINTMENT TOPICAL THREE TIMES A DAY
Qty: 0 | Refills: 0 | Status: DISCONTINUED | OUTPATIENT
Start: 2018-09-07 | End: 2018-09-08

## 2018-09-07 RX ORDER — ONDANSETRON 8 MG/1
4 TABLET, FILM COATED ORAL ONCE
Qty: 0 | Refills: 0 | Status: DISCONTINUED | OUTPATIENT
Start: 2018-09-07 | End: 2018-09-07

## 2018-09-07 RX ORDER — FENTANYL CITRATE 50 UG/ML
30 INJECTION INTRAVENOUS
Qty: 0 | Refills: 0 | Status: DISCONTINUED | OUTPATIENT
Start: 2018-09-07 | End: 2018-09-08

## 2018-09-07 RX ORDER — SODIUM CHLORIDE 9 MG/ML
3 INJECTION INTRAMUSCULAR; INTRAVENOUS; SUBCUTANEOUS EVERY 8 HOURS
Qty: 0 | Refills: 0 | Status: DISCONTINUED | OUTPATIENT
Start: 2018-09-07 | End: 2018-09-07

## 2018-09-07 RX ORDER — ALBUTEROL 90 UG/1
2 AEROSOL, METERED ORAL EVERY 6 HOURS
Qty: 0 | Refills: 0 | Status: DISCONTINUED | OUTPATIENT
Start: 2018-09-07 | End: 2018-09-08

## 2018-09-07 RX ORDER — SENNA PLUS 8.6 MG/1
2 TABLET ORAL AT BEDTIME
Qty: 0 | Refills: 0 | Status: DISCONTINUED | OUTPATIENT
Start: 2018-09-07 | End: 2018-09-08

## 2018-09-07 RX ORDER — KETOROLAC TROMETHAMINE 30 MG/ML
15 SYRINGE (ML) INJECTION EVERY 6 HOURS
Qty: 0 | Refills: 0 | Status: DISCONTINUED | OUTPATIENT
Start: 2018-09-07 | End: 2018-09-08

## 2018-09-07 RX ORDER — ALBUTEROL 90 UG/1
2.5 AEROSOL, METERED ORAL EVERY 6 HOURS
Qty: 0 | Refills: 0 | Status: DISCONTINUED | OUTPATIENT
Start: 2018-09-07 | End: 2018-09-08

## 2018-09-07 RX ORDER — LEVOTHYROXINE SODIUM 125 MCG
75 TABLET ORAL DAILY
Qty: 0 | Refills: 0 | Status: DISCONTINUED | OUTPATIENT
Start: 2018-09-07 | End: 2018-09-08

## 2018-09-07 RX ORDER — ACETAMINOPHEN 500 MG
650 TABLET ORAL EVERY 6 HOURS
Qty: 0 | Refills: 0 | Status: DISCONTINUED | OUTPATIENT
Start: 2018-09-07 | End: 2018-09-08

## 2018-09-07 RX ORDER — SODIUM CHLORIDE 9 MG/ML
1000 INJECTION, SOLUTION INTRAVENOUS
Qty: 0 | Refills: 0 | Status: DISCONTINUED | OUTPATIENT
Start: 2018-09-07 | End: 2018-09-07

## 2018-09-07 RX ORDER — ENOXAPARIN SODIUM 100 MG/ML
40 INJECTION SUBCUTANEOUS EVERY 24 HOURS
Qty: 0 | Refills: 0 | Status: DISCONTINUED | OUTPATIENT
Start: 2018-09-08 | End: 2018-09-08

## 2018-09-07 RX ORDER — ATORVASTATIN CALCIUM 80 MG/1
20 TABLET, FILM COATED ORAL AT BEDTIME
Qty: 0 | Refills: 0 | Status: DISCONTINUED | OUTPATIENT
Start: 2018-09-07 | End: 2018-09-08

## 2018-09-07 RX ORDER — SODIUM CHLORIDE 9 MG/ML
1000 INJECTION INTRAMUSCULAR; INTRAVENOUS; SUBCUTANEOUS
Qty: 0 | Refills: 0 | Status: DISCONTINUED | OUTPATIENT
Start: 2018-09-07 | End: 2018-09-08

## 2018-09-07 RX ORDER — IPRATROPIUM BROMIDE 0.2 MG/ML
500 SOLUTION, NON-ORAL INHALATION EVERY 6 HOURS
Qty: 0 | Refills: 0 | Status: DISCONTINUED | OUTPATIENT
Start: 2018-09-07 | End: 2018-09-08

## 2018-09-07 RX ORDER — FAMOTIDINE 10 MG/ML
20 INJECTION INTRAVENOUS EVERY 12 HOURS
Qty: 0 | Refills: 0 | Status: DISCONTINUED | OUTPATIENT
Start: 2018-09-07 | End: 2018-09-08

## 2018-09-07 RX ORDER — FENTANYL CITRATE 50 UG/ML
25 INJECTION INTRAVENOUS
Qty: 0 | Refills: 0 | Status: DISCONTINUED | OUTPATIENT
Start: 2018-09-07 | End: 2018-09-07

## 2018-09-07 RX ORDER — GABAPENTIN 400 MG/1
300 CAPSULE ORAL DAILY
Qty: 0 | Refills: 0 | Status: DISCONTINUED | OUTPATIENT
Start: 2018-09-07 | End: 2018-09-08

## 2018-09-07 RX ORDER — DOCUSATE SODIUM 100 MG
100 CAPSULE ORAL THREE TIMES A DAY
Qty: 0 | Refills: 0 | Status: DISCONTINUED | OUTPATIENT
Start: 2018-09-07 | End: 2018-09-08

## 2018-09-07 RX ADMIN — Medication 15 MILLIGRAM(S): at 19:00

## 2018-09-07 RX ADMIN — Medication 15 MILLIGRAM(S): at 18:29

## 2018-09-07 RX ADMIN — ATORVASTATIN CALCIUM 20 MILLIGRAM(S): 80 TABLET, FILM COATED ORAL at 23:06

## 2018-09-07 RX ADMIN — FENTANYL CITRATE 30 MILLILITER(S): 50 INJECTION INTRAVENOUS at 19:37

## 2018-09-07 RX ADMIN — FENTANYL CITRATE 30 MILLILITER(S): 50 INJECTION INTRAVENOUS at 16:20

## 2018-09-07 RX ADMIN — ALBUTEROL 2 PUFF(S): 90 AEROSOL, METERED ORAL at 21:11

## 2018-09-07 RX ADMIN — Medication 100 MILLIGRAM(S): at 23:06

## 2018-09-07 RX ADMIN — FENTANYL CITRATE 30 MILLILITER(S): 50 INJECTION INTRAVENOUS at 17:52

## 2018-09-07 RX ADMIN — SENNA PLUS 2 TABLET(S): 8.6 TABLET ORAL at 23:06

## 2018-09-07 RX ADMIN — MUPIROCIN 1 APPLICATION(S): 20 OINTMENT TOPICAL at 23:13

## 2018-09-07 NOTE — BRIEF OPERATIVE NOTE - PROCEDURE
<<-----Click on this checkbox to enter Procedure Wedge resection of right lung with video-assisted thoracoscopic surgery (VATS)  09/07/2018  Right upper lobe posterior segementectomy  [facility]  Flexible bronchoscopy by cardiothoracic surgery  09/07/2018    Active  [facility]

## 2018-09-07 NOTE — BRIEF OPERATIVE NOTE - PROCEDURE
<<-----Click on this checkbox to enter Procedure CT guided needle localization  09/07/2018  in right lung nodule with methylene blue dye infusion  Active  [id number]

## 2018-09-07 NOTE — BRIEF OPERATIVE NOTE - PRE-OP DX
Lung nodule  09/07/2018    Active  Roni Mclaughlin
Lung nodule  09/07/2018    Active  Roni Mclaughlin

## 2018-09-07 NOTE — BRIEF OPERATIVE NOTE - POST-OP DX
Lung nodule  09/07/2018    Active  Roni Mclaughlin
Adenocarcinoma of right lung  09/07/2018    Active  Katie Garcia  Lung nodule  09/07/2018    Active  Roni Mclaughlin

## 2018-09-07 NOTE — PROGRESS NOTE ADULT - SUBJECTIVE AND OBJECTIVE BOX
POST-OPERATIVE NOTE    PROCEDURE: Flex bronch, VATS with R upper lobe posterior lung resection    INTERVAL HPI: Pain well controlled. Tolerating PO intake. Voiding. Denies N/V/CP/SOB. On 2L NC    Vital Signs Last 24 Hrs  T(C): 36.7 (07 Sep 2018 17:59), Max: 36.7 (07 Sep 2018 11:40)  T(F): 98 (07 Sep 2018 17:59), Max: 98.1 (07 Sep 2018 11:40)  HR: 60 (07 Sep 2018 17:59) (50 - 72)  BP: 143/71 (07 Sep 2018 17:59) (126/97 - 161/83)  BP(mean): --  RR: 18 (07 Sep 2018 17:59) (12 - 18)  SpO2: 92% (07 Sep 2018 17:59) (92% - 100%)    PE  Gen: AAO x3, NAD  Pulm: CTAB, no resp distress. R chest tube in place, on WS  Abd: Soft, NT, ND, -R/-G  Neuro: No focal neurological deficits

## 2018-09-07 NOTE — BRIEF OPERATIVE NOTE - OPERATION/FINDINGS
22 g franseen needle in right lung nodule. 1cc of 50-50 solution of methylene blue and omnipaque 300 contrast injected into nodule.  Miniscule post procedure PTX
1. R lung nodule  2. Adenocarinoma of lung

## 2018-09-08 ENCOUNTER — TRANSCRIPTION ENCOUNTER (OUTPATIENT)
Age: 73
End: 2018-09-08

## 2018-09-08 VITALS
SYSTOLIC BLOOD PRESSURE: 113 MMHG | OXYGEN SATURATION: 97 % | HEART RATE: 75 BPM | DIASTOLIC BLOOD PRESSURE: 69 MMHG | RESPIRATION RATE: 19 BRPM | TEMPERATURE: 98 F

## 2018-09-08 LAB
ANION GAP SERPL CALC-SCNC: 15 MMOL/L — SIGNIFICANT CHANGE UP (ref 5–17)
BASOPHILS # BLD AUTO: 0 K/UL — SIGNIFICANT CHANGE UP (ref 0–0.2)
BASOPHILS NFR BLD AUTO: 0.1 % — SIGNIFICANT CHANGE UP (ref 0–2)
BUN SERPL-MCNC: 16 MG/DL — SIGNIFICANT CHANGE UP (ref 8–20)
CALCIUM SERPL-MCNC: 9.8 MG/DL — SIGNIFICANT CHANGE UP (ref 8.6–10.2)
CHLORIDE SERPL-SCNC: 102 MMOL/L — SIGNIFICANT CHANGE UP (ref 98–107)
CO2 SERPL-SCNC: 25 MMOL/L — SIGNIFICANT CHANGE UP (ref 22–29)
CREAT SERPL-MCNC: 0.66 MG/DL — SIGNIFICANT CHANGE UP (ref 0.5–1.3)
EOSINOPHIL # BLD AUTO: 0 K/UL — SIGNIFICANT CHANGE UP (ref 0–0.5)
EOSINOPHIL NFR BLD AUTO: 0 % — SIGNIFICANT CHANGE UP (ref 0–6)
GLUCOSE SERPL-MCNC: 148 MG/DL — HIGH (ref 70–115)
HCT VFR BLD CALC: 40.2 % — SIGNIFICANT CHANGE UP (ref 37–47)
HGB BLD-MCNC: 12.8 G/DL — SIGNIFICANT CHANGE UP (ref 12–16)
LYMPHOCYTES # BLD AUTO: 0.5 K/UL — LOW (ref 1–4.8)
LYMPHOCYTES # BLD AUTO: 4.1 % — LOW (ref 20–55)
MCHC RBC-ENTMCNC: 29.2 PG — SIGNIFICANT CHANGE UP (ref 27–31)
MCHC RBC-ENTMCNC: 31.8 G/DL — LOW (ref 32–36)
MCV RBC AUTO: 91.8 FL — SIGNIFICANT CHANGE UP (ref 81–99)
MONOCYTES # BLD AUTO: 0.4 K/UL — SIGNIFICANT CHANGE UP (ref 0–0.8)
MONOCYTES NFR BLD AUTO: 3.1 % — SIGNIFICANT CHANGE UP (ref 3–10)
NEUTROPHILS # BLD AUTO: 12 K/UL — HIGH (ref 1.8–8)
NEUTROPHILS NFR BLD AUTO: 92.5 % — HIGH (ref 37–73)
PLATELET # BLD AUTO: 161 K/UL — SIGNIFICANT CHANGE UP (ref 150–400)
POTASSIUM SERPL-MCNC: 4.3 MMOL/L — SIGNIFICANT CHANGE UP (ref 3.5–5.3)
POTASSIUM SERPL-SCNC: 4.3 MMOL/L — SIGNIFICANT CHANGE UP (ref 3.5–5.3)
RBC # BLD: 4.38 M/UL — LOW (ref 4.4–5.2)
RBC # FLD: 14.7 % — SIGNIFICANT CHANGE UP (ref 11–15.6)
SODIUM SERPL-SCNC: 142 MMOL/L — SIGNIFICANT CHANGE UP (ref 135–145)
WBC # BLD: 12.9 K/UL — HIGH (ref 4.8–10.8)
WBC # FLD AUTO: 12.9 K/UL — HIGH (ref 4.8–10.8)

## 2018-09-08 PROCEDURE — 36415 COLL VENOUS BLD VENIPUNCTURE: CPT

## 2018-09-08 PROCEDURE — 88341 IMHCHEM/IMCYTCHM EA ADD ANTB: CPT

## 2018-09-08 PROCEDURE — 88307 TISSUE EXAM BY PATHOLOGIST: CPT

## 2018-09-08 PROCEDURE — 88342 IMHCHEM/IMCYTCHM 1ST ANTB: CPT

## 2018-09-08 PROCEDURE — 71045 X-RAY EXAM CHEST 1 VIEW: CPT | Mod: 26

## 2018-09-08 PROCEDURE — 85027 COMPLETE CBC AUTOMATED: CPT

## 2018-09-08 PROCEDURE — 88331 PATH CONSLTJ SURG 1 BLK 1SPC: CPT

## 2018-09-08 PROCEDURE — 94640 AIRWAY INHALATION TREATMENT: CPT

## 2018-09-08 PROCEDURE — 76000 FLUOROSCOPY <1 HR PHYS/QHP: CPT

## 2018-09-08 PROCEDURE — 71045 X-RAY EXAM CHEST 1 VIEW: CPT

## 2018-09-08 PROCEDURE — 71045 X-RAY EXAM CHEST 1 VIEW: CPT | Mod: 26,77

## 2018-09-08 PROCEDURE — 88305 TISSUE EXAM BY PATHOLOGIST: CPT

## 2018-09-08 PROCEDURE — 80048 BASIC METABOLIC PNL TOTAL CA: CPT

## 2018-09-08 PROCEDURE — 94760 N-INVAS EAR/PLS OXIMETRY 1: CPT

## 2018-09-08 RX ORDER — FAMOTIDINE 10 MG/ML
1 INJECTION INTRAVENOUS
Qty: 14 | Refills: 0
Start: 2018-09-08 | End: 2018-09-14

## 2018-09-08 RX ORDER — ACETAMINOPHEN 500 MG
2 TABLET ORAL
Qty: 0 | Refills: 0 | DISCHARGE
Start: 2018-09-08

## 2018-09-08 RX ORDER — TIZANIDINE 4 MG/1
2 TABLET ORAL
Qty: 0 | Refills: 0 | COMMUNITY

## 2018-09-08 RX ADMIN — Medication 15 MILLIGRAM(S): at 12:00

## 2018-09-08 RX ADMIN — Medication 15 MILLIGRAM(S): at 01:20

## 2018-09-08 RX ADMIN — Medication 100 MILLIGRAM(S): at 06:17

## 2018-09-08 RX ADMIN — Medication 15 MILLIGRAM(S): at 06:22

## 2018-09-08 RX ADMIN — Medication 15 MILLIGRAM(S): at 00:50

## 2018-09-08 RX ADMIN — Medication 15 MILLIGRAM(S): at 11:15

## 2018-09-08 RX ADMIN — FENTANYL CITRATE 30 MILLILITER(S): 50 INJECTION INTRAVENOUS at 07:49

## 2018-09-08 RX ADMIN — Medication 75 MICROGRAM(S): at 06:17

## 2018-09-08 RX ADMIN — ALBUTEROL 2 PUFF(S): 90 AEROSOL, METERED ORAL at 03:00

## 2018-09-08 RX ADMIN — MUPIROCIN 1 APPLICATION(S): 20 OINTMENT TOPICAL at 06:18

## 2018-09-08 RX ADMIN — Medication 100 MILLIGRAM(S): at 14:17

## 2018-09-08 RX ADMIN — ALBUTEROL 2 PUFF(S): 90 AEROSOL, METERED ORAL at 15:14

## 2018-09-08 RX ADMIN — ALBUTEROL 2 PUFF(S): 90 AEROSOL, METERED ORAL at 08:33

## 2018-09-08 RX ADMIN — GABAPENTIN 300 MILLIGRAM(S): 400 CAPSULE ORAL at 11:15

## 2018-09-08 RX ADMIN — Medication 15 MILLIGRAM(S): at 06:52

## 2018-09-08 RX ADMIN — MUPIROCIN 1 APPLICATION(S): 20 OINTMENT TOPICAL at 14:17

## 2018-09-08 RX ADMIN — FAMOTIDINE 20 MILLIGRAM(S): 10 INJECTION INTRAVENOUS at 06:17

## 2018-09-08 NOTE — DISCHARGE NOTE ADULT - CARE PROVIDERS DIRECT ADDRESSES
[email or web address]

## 2018-09-08 NOTE — DISCHARGE NOTE ADULT - CARE PLAN
Principal Discharge DX:	Lung nodule  Goal:	rest and recover  Assessment and plan of treatment:	Please come to ED or Call Cardio thoracic office at [phone] if Chest pain, Shortness of Breath,  Nausea & vomiting, oozing from wounds

## 2018-09-08 NOTE — DISCHARGE NOTE ADULT - PLAN OF CARE
rest and recover Please come to ED or Call Cardio thoracic office at [phone] if Chest pain, Shortness of Breath,  Nausea & vomiting, oozing from wounds

## 2018-09-08 NOTE — DISCHARGE NOTE ADULT - MEDICATION SUMMARY - MEDICATIONS TO TAKE
I will START or STAY ON the medications listed below when I get home from the hospital:    acetaminophen 325 mg oral tablet  -- 2 tab(s) by mouth every 6 hours, As needed, Mild Pain (1 - 3)  -- Indication: For pain    Percocet 5/325 oral tablet  -- 1 tab(s) by mouth every 6 hours MDD:4  -- Caution federal law prohibits the transfer of this drug to any person other  than the person for whom it was prescribed.  May cause drowsiness.  Alcohol may intensify this effect.  Use care when operating dangerous machinery.  This prescription cannot be refilled.  This product contains acetaminophen.  Do not use  with any other product containing acetaminophen to prevent possible liver damage.  Using more of this medication than prescribed may cause serious breathing problems.    -- Indication: For pain    gabapentin enacarbil 300 mg oral tablet, extended release  -- 3 tab(s) by mouth once a day  -- Indication: For pain    Lipitor 20 mg oral tablet  -- 1 tab(s) by mouth once a day  -- Indication: For HLD    Symbicort 160 mcg-4.5 mcg/inh inhalation aerosol  -- 2 puff(s) inhaled 2 times a day  -- Indication: For COPD    ProAir HFA  -- Indication: For COPD    mupirocin 2% topical ointment  -- Apply on skin to affected area 3 times a day, right elbow  -- Indication: For Skin    famotidine 20 mg oral tablet  -- 1 tab(s) by mouth every 12 hours  -- Indication: For GI    Synthroid 75 mcg (0.075 mg) oral tablet  -- 1 tab(s) by mouth once a day  -- Indication: For Hypothyroid

## 2018-09-08 NOTE — DISCHARGE NOTE ADULT - PATIENT PORTAL LINK FT
You can access the [facility] Patient Portal, offered by [facility], by registering with the following website: [email or web address]

## 2018-09-08 NOTE — DISCHARGE NOTE ADULT - ADDITIONAL INSTRUCTIONS
Follow up appointment with Dr Chaudhary in 5-7 days   Cardiac surgery office second floor at [facility]

## 2018-09-08 NOTE — PROGRESS NOTE ADULT - ASSESSMENT
This is a 73 y/o F s/p Rt VATS RUL wedge resection for lung nodule, no post op issues     PLAN  D/C Tube  pain management   d/c home  VICKIE Chaudhary

## 2018-09-08 NOTE — PROGRESS NOTE ADULT - SUBJECTIVE AND OBJECTIVE BOX
Subjective: "   I feel good "  Pt in bed NAD     T(C): 36.9 (09-08-18 @ 11:13), Max: 36.9 (09-07-18 @ 22:30)  HR: 79 (09-08-18 @ 11:13) (50 - 79)  BP: 110/62 (09-08-18 @ 11:13) (110/62 - 161/83)    RR: 18 (09-08-18 @ 11:13) (12 - 18)  SpO2: 94% (09-08-18 @ 11:13) (92% - 100%)  Tele: SR    CHEST TUBE:      Rt                         OUTPUT:   37   per 24 hours    AIR LEAKS:  [ ] YES [ x] NO          09-08    142  |  102  |  16.0  ----------------------------<  148<H>  4.3   |  25.0  |  0.66    Ca    9.8      08 Sep 2018 05:51                                 12.8   12.9  )-----------( 161      ( 08 Sep 2018 05:51 )             40.2                 CAPILLARY BLOOD GLUCOSE               CXR:   < from: Xray Chest 1 View- PORTABLE-Routine (09.08.18 @ 06:41) >  NTERPRETATION:  CLINICAL INFORMATION: Chest tube placement.  TECHNIQUE: Serial single view chest radiographs were obtained.  COMPARISON: December 1, 2017.    EXAMINATION DATE: September 7, 2018 at 4:16 PM.  FINDINGS:  LINES/TUBES: Right apically directed chest tube.  LUNGS/PLEURA: Postsurgical changes in the right upper lung. Probable   small right pneumothorax. No pleural effusion.  MEDIASTINUM: Cardiomediastinal silhouette is unremarkable.  OTHER: None.  IMPRESSION:   Postsurgical changes in the right lung.  Probable small right pneumothorax.    EXAMINATION DATE: September 8, 2018 at 5:26 AM.  FINDINGS:  LINES/TUBES: Unchanged right apically directed chest tube.  LUNGS/PLEURA: Small right pneumothorax, decreased in size. Postsurgical   changes in the right lung. No pleural effusion.  MEDIASTINUM: Cardiomediastinal silhouette is unremarkable.  OTHER: None.  IMPRESSION:   Decreased size of small right pneumothorax.     < end of copied text >        Assessment  Neuro:  Alert Awake NAD   Pulm: decreased at bases b/l   CV: RRR S1 S2   Abd:  soft NT ND + BS   Extremities:  trace edema b/l LE

## 2018-09-17 ENCOUNTER — APPOINTMENT (OUTPATIENT)
Dept: THORACIC SURGERY | Facility: CLINIC | Age: 73
End: 2018-09-17
Payer: MEDICARE

## 2018-09-17 ENCOUNTER — OUTPATIENT (OUTPATIENT)
Dept: OUTPATIENT SERVICES | Facility: HOSPITAL | Age: 73
LOS: 1 days | End: 2018-09-17
Payer: MEDICARE

## 2018-09-17 VITALS
WEIGHT: 168 LBS | HEART RATE: 84 BPM | DIASTOLIC BLOOD PRESSURE: 79 MMHG | SYSTOLIC BLOOD PRESSURE: 123 MMHG | OXYGEN SATURATION: 94 % | BODY MASS INDEX: 28.68 KG/M2 | RESPIRATION RATE: 16 BRPM | HEIGHT: 64 IN

## 2018-09-17 DIAGNOSIS — R22.2 LOCALIZED SWELLING, MASS AND LUMP, TRUNK: ICD-10-CM

## 2018-09-17 DIAGNOSIS — Z90.710 ACQUIRED ABSENCE OF BOTH CERVIX AND UTERUS: Chronic | ICD-10-CM

## 2018-09-17 DIAGNOSIS — Z98.891 HISTORY OF UTERINE SCAR FROM PREVIOUS SURGERY: Chronic | ICD-10-CM

## 2018-09-17 PROCEDURE — 71046 X-RAY EXAM CHEST 2 VIEWS: CPT

## 2018-09-17 PROCEDURE — 71046 X-RAY EXAM CHEST 2 VIEWS: CPT | Mod: 26

## 2018-09-17 PROCEDURE — 99024 POSTOP FOLLOW-UP VISIT: CPT

## 2018-09-17 RX ORDER — FAMOTIDINE 20 MG/1
20 TABLET, FILM COATED ORAL
Qty: 14 | Refills: 0 | Status: DISCONTINUED | COMMUNITY
Start: 2018-09-08

## 2018-09-17 RX ORDER — OXYCODONE AND ACETAMINOPHEN 5; 325 MG/1; MG/1
5-325 TABLET ORAL
Qty: 12 | Refills: 0 | Status: ACTIVE | COMMUNITY
Start: 2018-09-08

## 2018-09-19 LAB — SURGICAL PATHOLOGY FINAL REPORT - CH: SIGNIFICANT CHANGE UP

## 2018-09-26 LAB — PATH REPORT ADDENDUM.SYNOPTIC DOC: SIGNIFICANT CHANGE UP

## 2018-11-28 ENCOUNTER — APPOINTMENT (OUTPATIENT)
Dept: CARDIOLOGY | Facility: CLINIC | Age: 73
End: 2018-11-28
Payer: MEDICARE

## 2018-11-28 ENCOUNTER — NON-APPOINTMENT (OUTPATIENT)
Age: 73
End: 2018-11-28

## 2018-11-28 VITALS
OXYGEN SATURATION: 93 % | HEIGHT: 63 IN | DIASTOLIC BLOOD PRESSURE: 80 MMHG | HEART RATE: 75 BPM | BODY MASS INDEX: 31.54 KG/M2 | SYSTOLIC BLOOD PRESSURE: 132 MMHG | WEIGHT: 178 LBS

## 2018-11-28 PROCEDURE — 93000 ELECTROCARDIOGRAM COMPLETE: CPT

## 2018-11-28 PROCEDURE — 99215 OFFICE O/P EST HI 40 MIN: CPT

## 2018-11-28 NOTE — CARDIOLOGY SUMMARY
[___] : [unfilled] [LVEF ___%] : LVEF [unfilled]% [Normal] : normal LA size [None] : no mitral regurgitation [___] : [unfilled]

## 2018-11-28 NOTE — PHYSICAL EXAM
[General Appearance - Well Developed] : well developed [Normal Appearance] : normal appearance [Well Groomed] : well groomed [General Appearance - Well Nourished] : well nourished [No Deformities] : no deformities [General Appearance - In No Acute Distress] : no acute distress [Normal Conjunctiva] : the conjunctiva exhibited no abnormalities [Eyelids - No Xanthelasma] : the eyelids demonstrated no xanthelasmas [Normal Oral Mucosa] : normal oral mucosa [No Oral Pallor] : no oral pallor [No Oral Cyanosis] : no oral cyanosis [Normal Jugular Venous A Waves Present] : normal jugular venous A waves present [Normal Jugular Venous V Waves Present] : normal jugular venous V waves present [No Jugular Venous Tilley A Waves] : no jugular venous tilley A waves [Respiration, Rhythm And Depth] : normal respiratory rhythm and effort [Exaggerated Use Of Accessory Muscles For Inspiration] : no accessory muscle use [Auscultation Breath Sounds / Voice Sounds] : lungs were clear to auscultation bilaterally [Heart Rate And Rhythm] : heart rate and rhythm were normal [Heart Sounds] : normal S1 and S2 [Murmurs] : no murmurs present [Abdomen Soft] : soft [Abdomen Tenderness] : non-tender [Abdomen Mass (___ Cm)] : no abdominal mass palpated [Nail Clubbing] : no clubbing of the fingernails [Cyanosis, Localized] : no localized cyanosis [Petechial Hemorrhages (___cm)] : no petechial hemorrhages [Skin Color & Pigmentation] : normal skin color and pigmentation [] : no rash [No Venous Stasis] : no venous stasis [Skin Lesions] : no skin lesions [No Skin Ulcers] : no skin ulcer [No Xanthoma] : no  xanthoma was observed [Oriented To Time, Place, And Person] : oriented to person, place, and time [Affect] : the affect was normal [Mood] : the mood was normal [No Anxiety] : not feeling anxious [FreeTextEntry1] : cannot exercise due to knee problems and dyspnea

## 2018-11-28 NOTE — DISCUSSION/SUMMARY
[Patient] : the patient [Guardian] : the guardian [Risks] : risks [Benefits] : benefits [Alternatives] : alternatives [___ Month(s)] : [unfilled] month(s) [FreeTextEntry1] : This is a 72 year old woman with chronic obstructive pulmonary disease and smoking here for Pre-operative cardiovascular risk evaluation and management \par 1) Pre-operative cardiovascular risk evaluation and management : tolerated surgery well.no cardiac complications.\par 2) Dyspnea: stress echo.

## 2018-11-28 NOTE — REASON FOR VISIT
[Follow-Up - Clinic] : a clinic follow-up of [FreeTextEntry2] : Pre-operative cardiovascular risk evaluation and management  and coronary artery disease [id number]  [FreeTextEntry1] : Pre-operative cardiovascular risk evaluation and management and coronary artery disease evaluation

## 2018-11-28 NOTE — HISTORY OF PRESENT ILLNESS
[FreeTextEntry1] : Pre-operative cardiovascular risk evaluation and management  and coronary artery disease evaluation\par Tolerated surgery well. no cardia complications. no headaches. No dizziness \par \par \par \par \par old note: Pre-operative cardiovascular risk evaluation and management \par This is a 72 year old woman with history of chronic obstructive pulmonary disease and smoking here for Pre-operative cardiovascular risk evaluation and management \par She got a routine CT scan of cet that showd lung nodule. \par PET scan showed malignancy\par Now scheduled for resection of the lung nodule (rt upper lung)

## 2018-12-19 ENCOUNTER — APPOINTMENT (OUTPATIENT)
Dept: PULMONOLOGY | Facility: CLINIC | Age: 73
End: 2018-12-19
Payer: MEDICARE

## 2018-12-19 VITALS — SYSTOLIC BLOOD PRESSURE: 122 MMHG | WEIGHT: 176 LBS | BODY MASS INDEX: 31.18 KG/M2 | DIASTOLIC BLOOD PRESSURE: 80 MMHG

## 2018-12-19 VITALS — HEART RATE: 77 BPM | OXYGEN SATURATION: 93 %

## 2018-12-19 DIAGNOSIS — R93.89 ABNORMAL FINDINGS ON DIAGNOSTIC IMAGING OF OTHER SPECIFIED BODY STRUCTURES: ICD-10-CM

## 2018-12-19 PROCEDURE — 99214 OFFICE O/P EST MOD 30 MIN: CPT

## 2018-12-19 RX ORDER — MUPIROCIN 2 %
OINTMENT (GRAM) TOPICAL
Refills: 0 | Status: DISCONTINUED | COMMUNITY
End: 2018-12-19

## 2019-01-02 ENCOUNTER — APPOINTMENT (OUTPATIENT)
Dept: CARDIOLOGY | Facility: CLINIC | Age: 74
End: 2019-01-02
Payer: MEDICARE

## 2019-01-02 PROCEDURE — 0439T MYOCRD CONTRAST PRFUJ ECHO: CPT | Mod: 59

## 2019-01-02 PROCEDURE — 93320 DOPPLER ECHO COMPLETE: CPT

## 2019-01-02 PROCEDURE — 93351 STRESS TTE COMPLETE: CPT

## 2019-01-02 PROCEDURE — 93352 ADMIN ECG CONTRAST AGENT: CPT

## 2019-04-18 ENCOUNTER — APPOINTMENT (OUTPATIENT)
Dept: PULMONOLOGY | Facility: CLINIC | Age: 74
End: 2019-04-18
Payer: MEDICARE

## 2019-04-18 VITALS
HEIGHT: 63 IN | HEART RATE: 73 BPM | DIASTOLIC BLOOD PRESSURE: 70 MMHG | BODY MASS INDEX: 31.01 KG/M2 | WEIGHT: 175 LBS | OXYGEN SATURATION: 93 % | SYSTOLIC BLOOD PRESSURE: 110 MMHG

## 2019-04-18 DIAGNOSIS — R06.00 DYSPNEA, UNSPECIFIED: ICD-10-CM

## 2019-04-18 DIAGNOSIS — R09.02 HYPOXEMIA: ICD-10-CM

## 2019-04-18 PROCEDURE — 94010 BREATHING CAPACITY TEST: CPT

## 2019-04-18 PROCEDURE — 99214 OFFICE O/P EST MOD 30 MIN: CPT | Mod: 25

## 2019-04-22 ENCOUNTER — RX RENEWAL (OUTPATIENT)
Age: 74
End: 2019-04-22

## 2019-04-26 ENCOUNTER — APPOINTMENT (OUTPATIENT)
Dept: PULMONOLOGY | Facility: CLINIC | Age: 74
End: 2019-04-26

## 2019-06-03 ENCOUNTER — TRANSCRIPTION ENCOUNTER (OUTPATIENT)
Age: 74
End: 2019-06-03

## 2019-06-03 ENCOUNTER — RX RENEWAL (OUTPATIENT)
Age: 74
End: 2019-06-03

## 2019-06-17 ENCOUNTER — RX RENEWAL (OUTPATIENT)
Age: 74
End: 2019-06-17

## 2019-06-24 ENCOUNTER — RX RENEWAL (OUTPATIENT)
Age: 74
End: 2019-06-24

## 2019-07-17 ENCOUNTER — APPOINTMENT (OUTPATIENT)
Dept: CARDIOLOGY | Facility: CLINIC | Age: 74
End: 2019-07-17

## 2019-10-04 ENCOUNTER — RX RENEWAL (OUTPATIENT)
Age: 74
End: 2019-10-04

## 2019-10-11 ENCOUNTER — RX RENEWAL (OUTPATIENT)
Age: 74
End: 2019-10-11

## 2019-10-11 RX ORDER — TIOTROPIUM BROMIDE 18 UG/1
18 CAPSULE ORAL; RESPIRATORY (INHALATION)
Qty: 90 | Refills: 1 | Status: ACTIVE | COMMUNITY
Start: 2018-08-28 | End: 1900-01-01

## 2019-10-24 ENCOUNTER — APPOINTMENT (OUTPATIENT)
Dept: PULMONOLOGY | Facility: CLINIC | Age: 74
End: 2019-10-24
Payer: MEDICARE

## 2019-10-24 VITALS
HEIGHT: 63 IN | DIASTOLIC BLOOD PRESSURE: 70 MMHG | OXYGEN SATURATION: 93 % | BODY MASS INDEX: 31.18 KG/M2 | HEART RATE: 95 BPM | SYSTOLIC BLOOD PRESSURE: 130 MMHG | WEIGHT: 176 LBS

## 2019-10-24 DIAGNOSIS — G47.34 IDIOPATHIC SLEEP RELATED NONOBSTRUCTIVE ALVEOLAR HYPOVENTILATION: ICD-10-CM

## 2019-10-24 DIAGNOSIS — Z87.09 PERSONAL HISTORY OF OTHER DISEASES OF THE RESPIRATORY SYSTEM: ICD-10-CM

## 2019-10-24 DIAGNOSIS — R91.1 SOLITARY PULMONARY NODULE: ICD-10-CM

## 2019-10-24 PROCEDURE — 99214 OFFICE O/P EST MOD 30 MIN: CPT

## 2019-11-26 ENCOUNTER — RESULT REVIEW (OUTPATIENT)
Age: 74
End: 2019-11-26

## 2020-02-19 ENCOUNTER — APPOINTMENT (OUTPATIENT)
Dept: PULMONOLOGY | Facility: CLINIC | Age: 75
End: 2020-02-19
Payer: MEDICARE

## 2020-02-19 VITALS
OXYGEN SATURATION: 95 % | HEART RATE: 82 BPM | BODY MASS INDEX: 31.69 KG/M2 | RESPIRATION RATE: 16 BRPM | DIASTOLIC BLOOD PRESSURE: 82 MMHG | WEIGHT: 170 LBS | HEIGHT: 61.5 IN | SYSTOLIC BLOOD PRESSURE: 128 MMHG

## 2020-02-19 DIAGNOSIS — J44.9 CHRONIC OBSTRUCTIVE PULMONARY DISEASE, UNSPECIFIED: ICD-10-CM

## 2020-02-19 DIAGNOSIS — R05 COUGH: ICD-10-CM

## 2020-02-19 DIAGNOSIS — C34.91 MALIGNANT NEOPLASM OF UNSPECIFIED PART OF RIGHT BRONCHUS OR LUNG: ICD-10-CM

## 2020-02-19 DIAGNOSIS — Z87.891 PERSONAL HISTORY OF NICOTINE DEPENDENCE: ICD-10-CM

## 2020-02-19 PROCEDURE — 99214 OFFICE O/P EST MOD 30 MIN: CPT

## 2020-02-19 RX ORDER — PREDNISONE 10 MG/1
10 TABLET ORAL
Qty: 21 | Refills: 3 | Status: DISCONTINUED | COMMUNITY
Start: 2019-10-24 | End: 2020-02-19

## 2020-02-19 RX ORDER — TIZANIDINE 2 MG/1
2 TABLET ORAL
Refills: 0 | Status: DISCONTINUED | COMMUNITY
End: 2020-02-19

## 2020-03-30 ENCOUNTER — INPATIENT (INPATIENT)
Facility: HOSPITAL | Age: 75
LOS: 12 days | DRG: 917 | End: 2020-04-12
Attending: HOSPITALIST | Admitting: INTERNAL MEDICINE
Payer: MEDICARE

## 2020-03-30 VITALS
DIASTOLIC BLOOD PRESSURE: 52 MMHG | RESPIRATION RATE: 32 BRPM | HEART RATE: 96 BPM | SYSTOLIC BLOOD PRESSURE: 96 MMHG | OXYGEN SATURATION: 99 %

## 2020-03-30 DIAGNOSIS — J96.00 ACUTE RESPIRATORY FAILURE, UNSPECIFIED WHETHER WITH HYPOXIA OR HYPERCAPNIA: ICD-10-CM

## 2020-03-30 DIAGNOSIS — Z90.710 ACQUIRED ABSENCE OF BOTH CERVIX AND UTERUS: Chronic | ICD-10-CM

## 2020-03-30 DIAGNOSIS — Z98.891 HISTORY OF UTERINE SCAR FROM PREVIOUS SURGERY: Chronic | ICD-10-CM

## 2020-03-30 LAB
ALBUMIN SERPL ELPH-MCNC: 3.8 G/DL — SIGNIFICANT CHANGE UP (ref 3.3–5.2)
ALP SERPL-CCNC: 102 U/L — SIGNIFICANT CHANGE UP (ref 40–120)
ALT FLD-CCNC: 928 U/L — HIGH
ANION GAP SERPL CALC-SCNC: 20 MMOL/L — HIGH (ref 5–17)
APTT BLD: 32.1 SEC — SIGNIFICANT CHANGE UP (ref 27.5–36.3)
AST SERPL-CCNC: 1260 U/L — HIGH
BASOPHILS # BLD AUTO: 0.03 K/UL — SIGNIFICANT CHANGE UP (ref 0–0.2)
BASOPHILS NFR BLD AUTO: 0.2 % — SIGNIFICANT CHANGE UP (ref 0–2)
BILIRUB SERPL-MCNC: 0.9 MG/DL — SIGNIFICANT CHANGE UP (ref 0.4–2)
BUN SERPL-MCNC: 26 MG/DL — HIGH (ref 8–20)
CALCIUM SERPL-MCNC: 9 MG/DL — SIGNIFICANT CHANGE UP (ref 8.6–10.2)
CHLORIDE SERPL-SCNC: 94 MMOL/L — LOW (ref 98–107)
CO2 SERPL-SCNC: 21 MMOL/L — LOW (ref 22–29)
CREAT SERPL-MCNC: 2.02 MG/DL — HIGH (ref 0.5–1.3)
CRP SERPL-MCNC: 11.27 MG/DL — HIGH (ref 0–0.4)
D DIMER BLD IA.RAPID-MCNC: 1022 NG/ML DDU — HIGH
EOSINOPHIL # BLD AUTO: 0.2 K/UL — SIGNIFICANT CHANGE UP (ref 0–0.5)
EOSINOPHIL NFR BLD AUTO: 1.6 % — SIGNIFICANT CHANGE UP (ref 0–6)
ERYTHROCYTE [SEDIMENTATION RATE] IN BLOOD: 21 MM/HR — HIGH (ref 0–20)
FERRITIN SERPL-MCNC: 3576 NG/ML — HIGH (ref 15–150)
FLU A RESULT: SIGNIFICANT CHANGE UP
FLU A RESULT: SIGNIFICANT CHANGE UP
FLUAV AG NPH QL: SIGNIFICANT CHANGE UP
FLUBV AG NPH QL: SIGNIFICANT CHANGE UP
GAS PNL BLDA: SIGNIFICANT CHANGE UP
GAS PNL BLDV: SIGNIFICANT CHANGE UP
GLUCOSE SERPL-MCNC: 99 MG/DL — SIGNIFICANT CHANGE UP (ref 70–99)
HCT VFR BLD CALC: 46.3 % — HIGH (ref 34.5–45)
HGB BLD-MCNC: 14.4 G/DL — SIGNIFICANT CHANGE UP (ref 11.5–15.5)
IMM GRANULOCYTES NFR BLD AUTO: 1.1 % — SIGNIFICANT CHANGE UP (ref 0–1.5)
INR BLD: 1.27 RATIO — HIGH (ref 0.88–1.16)
LACTATE BLDV-MCNC: 2.7 MMOL/L — HIGH (ref 0.5–2)
LDH SERPL L TO P-CCNC: 1863 U/L — HIGH (ref 98–192)
LYMPHOCYTES # BLD AUTO: 0.87 K/UL — LOW (ref 1–3.3)
LYMPHOCYTES # BLD AUTO: 6.9 % — LOW (ref 13–44)
MCHC RBC-ENTMCNC: 31.1 GM/DL — LOW (ref 32–36)
MCHC RBC-ENTMCNC: 31.7 PG — SIGNIFICANT CHANGE UP (ref 27–34)
MCV RBC AUTO: 102 FL — HIGH (ref 80–100)
MONOCYTES # BLD AUTO: 1.23 K/UL — HIGH (ref 0–0.9)
MONOCYTES NFR BLD AUTO: 9.7 % — SIGNIFICANT CHANGE UP (ref 2–14)
NEUTROPHILS # BLD AUTO: 10.16 K/UL — HIGH (ref 1.8–7.4)
NEUTROPHILS NFR BLD AUTO: 80.5 % — HIGH (ref 43–77)
PLATELET # BLD AUTO: 173 K/UL — SIGNIFICANT CHANGE UP (ref 150–400)
POTASSIUM SERPL-MCNC: 5.4 MMOL/L — HIGH (ref 3.5–5.3)
POTASSIUM SERPL-MCNC: 6.3 MMOL/L — CRITICAL HIGH (ref 3.5–5.3)
POTASSIUM SERPL-SCNC: 5.4 MMOL/L — HIGH (ref 3.5–5.3)
POTASSIUM SERPL-SCNC: 6.3 MMOL/L — CRITICAL HIGH (ref 3.5–5.3)
PROCALCITONIN SERPL-MCNC: 2.51 NG/ML — HIGH (ref 0.02–0.1)
PROT SERPL-MCNC: 6.8 G/DL — SIGNIFICANT CHANGE UP (ref 6.6–8.7)
PROTHROM AB SERPL-ACNC: 14.4 SEC — HIGH (ref 10–12.9)
RBC # BLD: 4.54 M/UL — SIGNIFICANT CHANGE UP (ref 3.8–5.2)
RBC # FLD: 14.6 % — HIGH (ref 10.3–14.5)
RSV RESULT: SIGNIFICANT CHANGE UP
RSV RNA RESP QL NAA+PROBE: SIGNIFICANT CHANGE UP
SODIUM SERPL-SCNC: 135 MMOL/L — SIGNIFICANT CHANGE UP (ref 135–145)
WBC # BLD: 12.63 K/UL — HIGH (ref 3.8–10.5)
WBC # FLD AUTO: 12.63 K/UL — HIGH (ref 3.8–10.5)

## 2020-03-30 PROCEDURE — 93010 ELECTROCARDIOGRAM REPORT: CPT

## 2020-03-30 PROCEDURE — 31500 INSERT EMERGENCY AIRWAY: CPT

## 2020-03-30 PROCEDURE — 71045 X-RAY EXAM CHEST 1 VIEW: CPT | Mod: 26

## 2020-03-30 PROCEDURE — 99291 CRITICAL CARE FIRST HOUR: CPT | Mod: 25

## 2020-03-30 PROCEDURE — 70450 CT HEAD/BRAIN W/O DYE: CPT | Mod: 26

## 2020-03-30 RX ORDER — CALCIUM GLUCONATE 100 MG/ML
1 VIAL (ML) INTRAVENOUS ONCE
Refills: 0 | Status: COMPLETED | OUTPATIENT
Start: 2020-03-30 | End: 2020-03-30

## 2020-03-30 RX ORDER — VANCOMYCIN HCL 1 G
1000 VIAL (EA) INTRAVENOUS ONCE
Refills: 0 | Status: COMPLETED | OUTPATIENT
Start: 2020-03-30 | End: 2020-03-30

## 2020-03-30 RX ORDER — ACETAMINOPHEN 500 MG
650 TABLET ORAL ONCE
Refills: 0 | Status: COMPLETED | OUTPATIENT
Start: 2020-03-30 | End: 2020-03-30

## 2020-03-30 RX ORDER — PIPERACILLIN AND TAZOBACTAM 4; .5 G/20ML; G/20ML
3.38 INJECTION, POWDER, LYOPHILIZED, FOR SOLUTION INTRAVENOUS ONCE
Refills: 0 | Status: COMPLETED | OUTPATIENT
Start: 2020-03-30 | End: 2020-03-30

## 2020-03-30 RX ORDER — SODIUM BICARBONATE 1 MEQ/ML
50 SYRINGE (ML) INTRAVENOUS ONCE
Refills: 0 | Status: COMPLETED | OUTPATIENT
Start: 2020-03-30 | End: 2020-03-30

## 2020-03-30 RX ORDER — FENTANYL CITRATE 50 UG/ML
0.5 INJECTION INTRAVENOUS
Qty: 2500 | Refills: 0 | Status: DISCONTINUED | OUTPATIENT
Start: 2020-03-30 | End: 2020-04-01

## 2020-03-30 RX ORDER — NOREPINEPHRINE BITARTRATE/D5W 8 MG/250ML
0.05 PLASTIC BAG, INJECTION (ML) INTRAVENOUS
Qty: 8 | Refills: 0 | Status: DISCONTINUED | OUTPATIENT
Start: 2020-03-30 | End: 2020-04-01

## 2020-03-30 RX ORDER — CHLORHEXIDINE GLUCONATE 213 G/1000ML
15 SOLUTION TOPICAL EVERY 12 HOURS
Refills: 0 | Status: DISCONTINUED | OUTPATIENT
Start: 2020-03-30 | End: 2020-04-02

## 2020-03-30 RX ORDER — ETOMIDATE 2 MG/ML
20 INJECTION INTRAVENOUS ONCE
Refills: 0 | Status: COMPLETED | OUTPATIENT
Start: 2020-03-30 | End: 2020-03-30

## 2020-03-30 RX ORDER — SODIUM CHLORIDE 9 MG/ML
1000 INJECTION INTRAMUSCULAR; INTRAVENOUS; SUBCUTANEOUS ONCE
Refills: 0 | Status: COMPLETED | OUTPATIENT
Start: 2020-03-30 | End: 2020-03-30

## 2020-03-30 RX ORDER — ROCURONIUM BROMIDE 10 MG/ML
80 VIAL (ML) INTRAVENOUS ONCE
Refills: 0 | Status: COMPLETED | OUTPATIENT
Start: 2020-03-30 | End: 2020-03-30

## 2020-03-30 RX ORDER — ACETAMINOPHEN 500 MG
650 TABLET ORAL ONCE
Refills: 0 | Status: DISCONTINUED | OUTPATIENT
Start: 2020-03-30 | End: 2020-03-30

## 2020-03-30 RX ADMIN — Medication 6.09 MICROGRAM(S)/KG/MIN: at 19:11

## 2020-03-30 RX ADMIN — Medication 250 MILLIGRAM(S): at 21:00

## 2020-03-30 RX ADMIN — Medication 2 MILLIGRAM(S): at 18:40

## 2020-03-30 RX ADMIN — Medication 650 MILLIGRAM(S): at 20:00

## 2020-03-30 RX ADMIN — ETOMIDATE 20 MILLIGRAM(S): 2 INJECTION INTRAVENOUS at 18:50

## 2020-03-30 RX ADMIN — Medication 50 MILLIEQUIVALENT(S): at 22:58

## 2020-03-30 RX ADMIN — PIPERACILLIN AND TAZOBACTAM 200 GRAM(S): 4; .5 INJECTION, POWDER, LYOPHILIZED, FOR SOLUTION INTRAVENOUS at 20:12

## 2020-03-30 RX ADMIN — FENTANYL CITRATE 3.25 MICROGRAM(S)/KG/HR: 50 INJECTION INTRAVENOUS at 19:11

## 2020-03-30 RX ADMIN — SODIUM CHLORIDE 1000 MILLILITER(S): 9 INJECTION INTRAMUSCULAR; INTRAVENOUS; SUBCUTANEOUS at 19:11

## 2020-03-30 RX ADMIN — Medication 80 MILLIGRAM(S): at 18:50

## 2020-03-30 RX ADMIN — Medication 100 GRAM(S): at 22:58

## 2020-03-30 NOTE — ED PROVIDER NOTE - PMH
Chronic knee pain    COPD (chronic obstructive pulmonary disease)    Hyperlipidemia    Hypothyroid    Lung nodule

## 2020-03-30 NOTE — ED PROVIDER NOTE - PHYSICAL EXAMINATION
PHYSICAL EXAM:   General: lethargic, yellow secretions from nasal passages, intermittent upper respiratory noises      HEENT: NC/AT, PERRLA, conjunctiva WNL, airway patent  Cardiovascular: regular rate and rhythm, + S1/S2, no murmurs, rubs, gallops appreciated  Respiratory: clear to auscultation bilaterally, good aeration bilaterally, nonlabored respirations  Abdominal: soft, nontender, nondistended, no rebound, guarding or rigidity, +bowel sounds  Back: no costovertebral tenderness, no rashes noted  Extremities: no LE edema b/l. Radial pulses equal and strong b/l  Neuro: Alert and oriented x3. Nonfocal neurologic exam  Psychiatric: appropriate mood and affect.   Skin: appropriate color, warm, dry.   -Virginia Malcolm PGY-2 PHYSICAL EXAM:   General: lethargic, yellow secretions from nasal passages  HEENT: intubated for airway protection  Cardiovascular: regular rate   Respiratory: b/l breath sounds after intubation  Abdominal: soft, nontender, obese abdomen  Extremities: no LE edema b/l.   Neuro: initially arousable to tactile stimuli, more arousable after narcan, but then generalized tonic clonic seizure per HPI  -Virginia Malcolm PGY-2

## 2020-03-30 NOTE — ED PROVIDER NOTE - ATTENDING CONTRIBUTION TO CARE
74yoF; with pmh signif for COPD, Lung Ca(s/p partial lung resection), HTN; now p/w ams, last seen normal yesterday night when she went out smoking. found on ground this evening. ems states patient with having agonal breaths upon arrival, hypotensive, and covered is secretions over mouth and nose. they also noted multiple fentanyl patches which were removed upon arrival to ED. patient minimally responsive and desturating in ED despite placement of NRB and had 30sec grand mal seizure s/p narcan administration.   EXAM:  General:   obtunded  Head:     NC/AT, EOMI, chemosis of bilateral conjunctiva   Neck:     trachea midline  Lungs:     CTA b/l, no w/r/r  CVS:     S1S2, RRR, no m/g/r  Abd:     +BS, s/nt/nd, no organomegaly  Ext:    2+ radial and pedal pulses, no c/c/e  Neuro: obtunded, L UE and L LE weakness,  A/P:  74yoF p/w ams  -intubated for airway protection  -given iv abx for possible aspiration pna

## 2020-03-30 NOTE — ED PROVIDER NOTE - CARE PLAN
Principal Discharge DX:	Acute respiratory failure  Goal:	requiring intubation  Secondary Diagnosis:	Acute renal failure  Secondary Diagnosis:	Aspiration pneumonia

## 2020-03-30 NOTE — ED PROVIDER NOTE - OBJECTIVE STATEMENT
75 yo F pmh copd, lung nodule, smoker presents BIBEMS with altered mental status. Per EMS, family last saw patient last night. This morning, family found patient with "agonal" respirations. Per EMS, BP was 60s systolic, and they were unable to obtain a pulse ox so they began bagging. Pt initially saturating high 90s on NRB. Found to have multiple fentanyl patches which were removed by RN. Pupils were not pinpoint at that time and patient was arousable to tactile stimuli at that time. Narcan was administered and patient became more arousable.  However, soon after, patient had an approx 30 second generalized tonic clonic seizure responsive to 2 mg Ativan. Multiple secretions in nasal passages. 75 yo F pmh copd, HLD, hypothyroid, lung nodule, smoker presents BIBEMS with altered mental status. Per EMS, family last saw patient last night. This morning, family found patient with "agonal" respirations. Per EMS, BP was 60s systolic, and they were unable to obtain a pulse ox so they began bagging. Pt initially saturating high 90s on NRB. Found to have multiple fentanyl patches which were removed by RN. Pupils were not pinpoint at that time and patient was arousable to tactile stimuli at that time. Narcan was administered and patient became more arousable.  However, soon after, patient had an approx 30 second generalized tonic clonic seizure responsive to 2 mg Ativan. Multiple secretions in nasal passages.

## 2020-03-30 NOTE — ED PROVIDER NOTE - CLINICAL SUMMARY MEDICAL DECISION MAKING FREE TEXT BOX
73 yo F pmh copd, HLD, hypothyroid, lung nodule, smoker presents BIBEMS with altered mental status. generalized tonic clonic seizure in ER. intubated for airway protection. ddx includes but not limited to infectious (ex covid vs aspiration or other bacterial pna) vs metabolic vs CNS (ischemic vs ICH stroke). Labs, CT head, chest xray, abx, MICU.

## 2020-03-30 NOTE — ED ADULT NURSE REASSESSMENT NOTE - NS ED NURSE REASSESS COMMENT FT1
S[facility] in [place] [phone]
pt had mutiple fentyl patches on abd and chest patches were removed and Narcan 0.4 mg IV was give as per MD order. pt slightly more responsive and then started seizing . MD at bedside pt being bagged at this time ativan 2 mg IV given at this time pt stopped seizing MD at bedside pt more awake and moaning. /59, p-93 Pulse oX 98 on 100% NRBM
assumed care at 1845, pt being intubated by md moore, md mead and rt at bedside, 7.5/23 @LL, vent @ 16RR, 400 TV, 100%, 5PEEP, vs stable, medicated as per ordered.
icu pa at bedside

## 2020-03-31 LAB
ALBUMIN SERPL ELPH-MCNC: 2.1 G/DL — LOW (ref 3.3–5.2)
ALBUMIN SERPL ELPH-MCNC: 3.1 G/DL — LOW (ref 3.3–5.2)
ALP SERPL-CCNC: 113 U/L — SIGNIFICANT CHANGE UP (ref 40–120)
ALP SERPL-CCNC: 68 U/L — SIGNIFICANT CHANGE UP (ref 40–120)
ALT FLD-CCNC: 1320 U/L — HIGH
ALT FLD-CCNC: 1771 U/L — HIGH
ANION GAP SERPL CALC-SCNC: 17 MMOL/L — SIGNIFICANT CHANGE UP (ref 5–17)
ANION GAP SERPL CALC-SCNC: 21 MMOL/L — HIGH (ref 5–17)
AST SERPL-CCNC: 1206 U/L — HIGH
AST SERPL-CCNC: 1421 U/L — HIGH
BASE EXCESS BLDA CALC-SCNC: -4.9 MMOL/L — LOW (ref -3–3)
BASOPHILS # BLD AUTO: 0 K/UL — SIGNIFICANT CHANGE UP (ref 0–0.2)
BASOPHILS NFR BLD AUTO: 0 % — SIGNIFICANT CHANGE UP (ref 0–2)
BILIRUB SERPL-MCNC: 0.3 MG/DL — LOW (ref 0.4–2)
BILIRUB SERPL-MCNC: 0.4 MG/DL — SIGNIFICANT CHANGE UP (ref 0.4–2)
BLOOD GAS COMMENTS ARTERIAL: SIGNIFICANT CHANGE UP
BUN SERPL-MCNC: 33 MG/DL — HIGH (ref 8–20)
BUN SERPL-MCNC: 45 MG/DL — HIGH (ref 8–20)
CALCIUM SERPL-MCNC: 5.6 MG/DL — CRITICAL LOW (ref 8.6–10.2)
CALCIUM SERPL-MCNC: 8.1 MG/DL — LOW (ref 8.6–10.2)
CHLORIDE SERPL-SCNC: 108 MMOL/L — HIGH (ref 98–107)
CHLORIDE SERPL-SCNC: 94 MMOL/L — LOW (ref 98–107)
CO2 SERPL-SCNC: 16 MMOL/L — LOW (ref 22–29)
CO2 SERPL-SCNC: 19 MMOL/L — LOW (ref 22–29)
CREAT SERPL-MCNC: 2.44 MG/DL — HIGH (ref 0.5–1.3)
CREAT SERPL-MCNC: 3.7 MG/DL — HIGH (ref 0.5–1.3)
EOSINOPHIL # BLD AUTO: 0 K/UL — SIGNIFICANT CHANGE UP (ref 0–0.5)
EOSINOPHIL NFR BLD AUTO: 0 % — SIGNIFICANT CHANGE UP (ref 0–6)
GAS PNL BLDA: SIGNIFICANT CHANGE UP
GIANT PLATELETS BLD QL SMEAR: PRESENT — SIGNIFICANT CHANGE UP
GLUCOSE BLDC GLUCOMTR-MCNC: 147 MG/DL — HIGH (ref 70–99)
GLUCOSE SERPL-MCNC: 115 MG/DL — HIGH (ref 70–99)
GLUCOSE SERPL-MCNC: 161 MG/DL — HIGH (ref 70–99)
HCO3 BLDA-SCNC: 20 MMOL/L — SIGNIFICANT CHANGE UP (ref 20–26)
HCT VFR BLD CALC: 25.6 % — LOW (ref 34.5–45)
HCT VFR BLD CALC: 42.3 % — SIGNIFICANT CHANGE UP (ref 34.5–45)
HGB BLD-MCNC: 13.3 G/DL — SIGNIFICANT CHANGE UP (ref 11.5–15.5)
HGB BLD-MCNC: 7.8 G/DL — LOW (ref 11.5–15.5)
HOROWITZ INDEX BLDA+IHG-RTO: 0.4 — SIGNIFICANT CHANGE UP
LYMPHOCYTES # BLD AUTO: 0.43 K/UL — LOW (ref 1–3.3)
LYMPHOCYTES # BLD AUTO: 5.3 % — LOW (ref 13–44)
MAGNESIUM SERPL-MCNC: 1.1 MG/DL — LOW (ref 1.6–2.6)
MANUAL SMEAR VERIFICATION: SIGNIFICANT CHANGE UP
MCHC RBC-ENTMCNC: 30.5 GM/DL — LOW (ref 32–36)
MCHC RBC-ENTMCNC: 31.1 PG — SIGNIFICANT CHANGE UP (ref 27–34)
MCHC RBC-ENTMCNC: 31.1 PG — SIGNIFICANT CHANGE UP (ref 27–34)
MCHC RBC-ENTMCNC: 31.4 GM/DL — LOW (ref 32–36)
MCV RBC AUTO: 102 FL — HIGH (ref 80–100)
MCV RBC AUTO: 99.1 FL — SIGNIFICANT CHANGE UP (ref 80–100)
MONOCYTES # BLD AUTO: 0 K/UL — SIGNIFICANT CHANGE UP (ref 0–0.9)
MONOCYTES NFR BLD AUTO: 0 % — LOW (ref 2–14)
NEUTROPHILS # BLD AUTO: 7.71 K/UL — HIGH (ref 1.8–7.4)
NEUTROPHILS NFR BLD AUTO: 94.7 % — HIGH (ref 43–77)
PCO2 BLDA: 53 MMHG — HIGH (ref 35–45)
PH BLDA: 7.24 — LOW (ref 7.35–7.45)
PHOSPHATE SERPL-MCNC: 4.5 MG/DL — SIGNIFICANT CHANGE UP (ref 2.4–4.7)
PLAT MORPH BLD: NORMAL — SIGNIFICANT CHANGE UP
PLATELET # BLD AUTO: 134 K/UL — LOW (ref 150–400)
PLATELET # BLD AUTO: 88 K/UL — LOW (ref 150–400)
PO2 BLDA: 67 MMHG — LOW (ref 83–108)
POTASSIUM SERPL-MCNC: 3 MMOL/L — LOW (ref 3.5–5.3)
POTASSIUM SERPL-MCNC: 4.6 MMOL/L — SIGNIFICANT CHANGE UP (ref 3.5–5.3)
POTASSIUM SERPL-SCNC: 3 MMOL/L — LOW (ref 3.5–5.3)
POTASSIUM SERPL-SCNC: 4.6 MMOL/L — SIGNIFICANT CHANGE UP (ref 3.5–5.3)
PROT SERPL-MCNC: 4.1 G/DL — LOW (ref 6.6–8.7)
PROT SERPL-MCNC: 5.8 G/DL — LOW (ref 6.6–8.7)
RBC # BLD: 2.51 M/UL — LOW (ref 3.8–5.2)
RBC # BLD: 4.27 M/UL — SIGNIFICANT CHANGE UP (ref 3.8–5.2)
RBC # FLD: 14.5 % — SIGNIFICANT CHANGE UP (ref 10.3–14.5)
RBC # FLD: 14.6 % — HIGH (ref 10.3–14.5)
RBC BLD AUTO: NORMAL — SIGNIFICANT CHANGE UP
SAO2 % BLDA: 93 % — LOW (ref 95–99)
SODIUM SERPL-SCNC: 134 MMOL/L — LOW (ref 135–145)
SODIUM SERPL-SCNC: 141 MMOL/L — SIGNIFICANT CHANGE UP (ref 135–145)
WBC # BLD: 13.3 K/UL — HIGH (ref 3.8–10.5)
WBC # BLD: 8.14 K/UL — SIGNIFICANT CHANGE UP (ref 3.8–10.5)
WBC # FLD AUTO: 13.3 K/UL — HIGH (ref 3.8–10.5)
WBC # FLD AUTO: 8.14 K/UL — SIGNIFICANT CHANGE UP (ref 3.8–10.5)

## 2020-03-31 PROCEDURE — 99291 CRITICAL CARE FIRST HOUR: CPT

## 2020-03-31 PROCEDURE — 71045 X-RAY EXAM CHEST 1 VIEW: CPT | Mod: 26

## 2020-03-31 RX ORDER — IPRATROPIUM BROMIDE 0.2 MG/ML
1 SOLUTION, NON-ORAL INHALATION EVERY 6 HOURS
Refills: 0 | Status: DISCONTINUED | OUTPATIENT
Start: 2020-03-31 | End: 2020-04-09

## 2020-03-31 RX ORDER — ALBUTEROL 90 UG/1
1 AEROSOL, METERED ORAL EVERY 4 HOURS
Refills: 0 | Status: DISCONTINUED | OUTPATIENT
Start: 2020-03-31 | End: 2020-04-09

## 2020-03-31 RX ORDER — ATORVASTATIN CALCIUM 80 MG/1
1 TABLET, FILM COATED ORAL
Qty: 0 | Refills: 0 | DISCHARGE

## 2020-03-31 RX ORDER — AZITHROMYCIN 500 MG/1
250 TABLET, FILM COATED ORAL EVERY 24 HOURS
Refills: 0 | Status: COMPLETED | OUTPATIENT
Start: 2020-04-01 | End: 2020-04-04

## 2020-03-31 RX ORDER — SODIUM CHLORIDE 9 MG/ML
250 INJECTION, SOLUTION INTRAVENOUS ONCE
Refills: 0 | Status: COMPLETED | OUTPATIENT
Start: 2020-03-31 | End: 2020-03-31

## 2020-03-31 RX ORDER — HYDROXYCHLOROQUINE SULFATE 200 MG
200 TABLET ORAL EVERY 12 HOURS
Refills: 0 | Status: DISCONTINUED | OUTPATIENT
Start: 2020-04-01 | End: 2020-04-02

## 2020-03-31 RX ORDER — GABAPENTIN 400 MG/1
3 CAPSULE ORAL
Qty: 0 | Refills: 0 | DISCHARGE

## 2020-03-31 RX ORDER — AZITHROMYCIN 500 MG/1
TABLET, FILM COATED ORAL
Refills: 0 | Status: COMPLETED | OUTPATIENT
Start: 2020-03-31 | End: 2020-04-04

## 2020-03-31 RX ORDER — MUPIROCIN 20 MG/G
1 OINTMENT TOPICAL
Qty: 0 | Refills: 0 | DISCHARGE

## 2020-03-31 RX ORDER — ALBUTEROL 90 UG/1
0 AEROSOL, METERED ORAL
Qty: 0 | Refills: 0 | DISCHARGE

## 2020-03-31 RX ORDER — GABAPENTIN 400 MG/1
1 CAPSULE ORAL
Qty: 0 | Refills: 0 | DISCHARGE

## 2020-03-31 RX ORDER — ENOXAPARIN SODIUM 100 MG/ML
30 INJECTION SUBCUTANEOUS DAILY
Refills: 0 | Status: DISCONTINUED | OUTPATIENT
Start: 2020-03-31 | End: 2020-04-02

## 2020-03-31 RX ORDER — BUDESONIDE AND FORMOTEROL FUMARATE DIHYDRATE 160; 4.5 UG/1; UG/1
2 AEROSOL RESPIRATORY (INHALATION)
Qty: 0 | Refills: 0 | DISCHARGE

## 2020-03-31 RX ORDER — MIDODRINE HYDROCHLORIDE 2.5 MG/1
10 TABLET ORAL EVERY 8 HOURS
Refills: 0 | Status: DISCONTINUED | OUTPATIENT
Start: 2020-03-31 | End: 2020-04-08

## 2020-03-31 RX ORDER — ALBUTEROL 90 UG/1
2 AEROSOL, METERED ORAL EVERY 6 HOURS
Refills: 0 | Status: DISCONTINUED | OUTPATIENT
Start: 2020-03-31 | End: 2020-04-09

## 2020-03-31 RX ORDER — CHLORHEXIDINE GLUCONATE 213 G/1000ML
1 SOLUTION TOPICAL
Refills: 0 | Status: DISCONTINUED | OUTPATIENT
Start: 2020-03-31 | End: 2020-03-31

## 2020-03-31 RX ORDER — LEVOTHYROXINE SODIUM 125 MCG
50 TABLET ORAL AT BEDTIME
Refills: 0 | Status: DISCONTINUED | OUTPATIENT
Start: 2020-03-31 | End: 2020-04-12

## 2020-03-31 RX ORDER — TIOTROPIUM BROMIDE 18 UG/1
1 CAPSULE ORAL; RESPIRATORY (INHALATION)
Qty: 0 | Refills: 0 | DISCHARGE

## 2020-03-31 RX ORDER — CEFTRIAXONE 500 MG/1
1000 INJECTION, POWDER, FOR SOLUTION INTRAMUSCULAR; INTRAVENOUS EVERY 24 HOURS
Refills: 0 | Status: COMPLETED | OUTPATIENT
Start: 2020-03-31 | End: 2020-04-04

## 2020-03-31 RX ORDER — HYDROXYCHLOROQUINE SULFATE 200 MG
TABLET ORAL
Refills: 0 | Status: DISCONTINUED | OUTPATIENT
Start: 2020-03-31 | End: 2020-04-02

## 2020-03-31 RX ORDER — LEVETIRACETAM 250 MG/1
500 TABLET, FILM COATED ORAL EVERY 12 HOURS
Refills: 0 | Status: DISCONTINUED | OUTPATIENT
Start: 2020-03-31 | End: 2020-04-07

## 2020-03-31 RX ORDER — HYDROXYCHLOROQUINE SULFATE 200 MG
400 TABLET ORAL EVERY 12 HOURS
Refills: 0 | Status: COMPLETED | OUTPATIENT
Start: 2020-03-31 | End: 2020-04-01

## 2020-03-31 RX ORDER — AZITHROMYCIN 500 MG/1
500 TABLET, FILM COATED ORAL ONCE
Refills: 0 | Status: COMPLETED | OUTPATIENT
Start: 2020-03-31 | End: 2020-03-31

## 2020-03-31 RX ORDER — PANTOPRAZOLE SODIUM 20 MG/1
40 TABLET, DELAYED RELEASE ORAL DAILY
Refills: 0 | Status: DISCONTINUED | OUTPATIENT
Start: 2020-03-31 | End: 2020-04-12

## 2020-03-31 RX ORDER — LEVOTHYROXINE SODIUM 125 MCG
1 TABLET ORAL
Qty: 0 | Refills: 0 | DISCHARGE

## 2020-03-31 RX ADMIN — Medication 6.09 MICROGRAM(S)/KG/MIN: at 09:39

## 2020-03-31 RX ADMIN — LEVETIRACETAM 420 MILLIGRAM(S): 250 TABLET, FILM COATED ORAL at 17:11

## 2020-03-31 RX ADMIN — LEVETIRACETAM 420 MILLIGRAM(S): 250 TABLET, FILM COATED ORAL at 08:38

## 2020-03-31 RX ADMIN — Medication 400 MILLIGRAM(S): at 16:30

## 2020-03-31 RX ADMIN — Medication 50 MICROGRAM(S): at 21:34

## 2020-03-31 RX ADMIN — Medication 125 MILLIGRAM(S): at 21:44

## 2020-03-31 RX ADMIN — FENTANYL CITRATE 3.25 MICROGRAM(S)/KG/HR: 50 INJECTION INTRAVENOUS at 09:38

## 2020-03-31 RX ADMIN — MIDODRINE HYDROCHLORIDE 10 MILLIGRAM(S): 2.5 TABLET ORAL at 16:27

## 2020-03-31 RX ADMIN — AZITHROMYCIN 255 MILLIGRAM(S): 500 TABLET, FILM COATED ORAL at 03:45

## 2020-03-31 RX ADMIN — SODIUM CHLORIDE 500 MILLILITER(S): 9 INJECTION, SOLUTION INTRAVENOUS at 22:47

## 2020-03-31 RX ADMIN — PANTOPRAZOLE SODIUM 40 MILLIGRAM(S): 20 TABLET, DELAYED RELEASE ORAL at 12:04

## 2020-03-31 RX ADMIN — Medication 6.09 MICROGRAM(S)/KG/MIN: at 16:27

## 2020-03-31 RX ADMIN — CHLORHEXIDINE GLUCONATE 15 MILLILITER(S): 213 SOLUTION TOPICAL at 17:11

## 2020-03-31 RX ADMIN — ENOXAPARIN SODIUM 30 MILLIGRAM(S): 100 INJECTION SUBCUTANEOUS at 12:05

## 2020-03-31 RX ADMIN — CHLORHEXIDINE GLUCONATE 15 MILLILITER(S): 213 SOLUTION TOPICAL at 07:06

## 2020-03-31 RX ADMIN — MIDODRINE HYDROCHLORIDE 10 MILLIGRAM(S): 2.5 TABLET ORAL at 21:34

## 2020-03-31 RX ADMIN — CEFTRIAXONE 100 MILLIGRAM(S): 500 INJECTION, POWDER, FOR SOLUTION INTRAMUSCULAR; INTRAVENOUS at 07:00

## 2020-03-31 NOTE — PROGRESS NOTE ADULT - SUBJECTIVE AND OBJECTIVE BOX
Reviewed case with patient's daughter over the phone. Of note, patient had started smoking again recently.     All questions answered.

## 2020-03-31 NOTE — H&P ADULT - NSHPPHYSICALEXAM_GEN_ALL_CORE
Neuro: Sedated on full vent support. Unable to assess    HEENT: Pupils equal, reactive to light, Moist oral mucosa    PULM: Clear to auscultation bilaterally, no significant adventitious breath sounds     CVS: Regular rhythm and controlled rate, no murmurs, rubs, or gallops    ABD: Soft, nondistended, nontender, normoactive bowel sounds    EXT: No b/l LE edema, nontender with pedal pulse palpable     SKIN: Warm and well perfused, no acute rashes

## 2020-03-31 NOTE — ED ADULT NURSE NOTE - OBJECTIVE STATEMENT
pt presents to the ed unresponsive bib ems, pt had mutiple fentyl patches on abd and chest, patches were removed and Narcan 0.4 mg IV was give as per MD ramirez order. pt began to arouse and began seizing. pt intubated by md, RT at bedside.

## 2020-03-31 NOTE — PROGRESS NOTE ADULT - ASSESSMENT
-VDRF; see below  -R/O COVID; PUI  -Mild COPD  -Renal failure  -Hypercarbic resp failure  -Hyperkalemia  -S/P seizure episode  -AMS; ?med effect +/- sepsis  -Hx lung cancer    RECC:  Continue vent per ARDSnet protocol. Wean as able. Hydrocholoroquine. Abx. Lovenox. Steroids. AEDs. Follow [facility] K+. Albuterol MDI q6h.

## 2020-03-31 NOTE — H&P ADULT - ASSESSMENT
Acute hypoxic resp failure  Acute encephalopathy  Opoid overdose  Breakthrough seizure  COPD/ current smoker  Hypothyroid    Neuro: Initial CTH was WNL. Started on Keppra BID and ativan PRN for breakthrough seizure. Check Utox  Cardiovascular: Aim for MAP target 65, titrate levophed.   Resp/Chest: On Volume AC. Wean per protocol as tolerated and daily SBT  GI/Nutrition: NPO for now. Started on IV PPI. Bowel regimen as needed  ID: Holding off on Plaquenil given transaminitis Check hepatitis/HIV/ RVP and COVID. Will start empirically on Azithromycin and Ceftriaxone  Renal: Avoid nephrotoxic agents. Strict I&O  Endocrinology: Maintain Blood sugar < 180. ISS as per protocol. Continue IV synthroid  Haem/Oncology:  DVT ppx w/ HSQ    Case was discussed extensively w/ daughter Mayra # [phone] over the phone    Critical Care time: 40 minutes assessing presenting problems of acute illness that poses high probability of life threatening deterioration or end organ damage/dysfunction.  Medical decision making including Initiating plan of care, reviewing data, reviewing radiology, discussing with multidisciplinary team, non inclusive of procedures, discussing goals of care with patient/family Acute hypoxic resp failure  Acute encephalopathy  Opoid overdose  Breakthrough seizure  COPD/ current smoker  Hypothyroid    Neuro: Initial CTH was WNL. Started on Keppra BID and ativan PRN for breakthrough seizure. Check Utox  Cardiovascular: Aim for MAP target 65, titrate levophed as per protocol. Maintain net negative fluid balance   Resp/Chest: On Volume AC. Low tidal volume ARDSnet ventilation strategy, high PEEP. Aim for Pplat < 30. Wean per protocol as tolerated and daily SBT  GI/Nutrition: NPO for now. Started on IV PPI. Bowel regimen as needed  ID: Holding off on Plaquenil and statin given transaminitis Check hepatitis/HIV/ and COVID. f/u BCx. Will start empirically on Azithromycin and Ceftriaxone  Renal: Avoid nephrotoxic agents. Strict I&O  Endocrinology: Maintain Blood sugar < 180. ISS as per protocol. Continue IV synthroid  Haem/Oncology:  DVT ppx w/ HSQ    Case was discussed extensively w/ daughter Mayra # [phone] over the phone    Critical Care time: 40 minutes assessing presenting problems of acute illness that poses high probability of life threatening deterioration or end organ damage/dysfunction.  Medical decision making including Initiating plan of care, reviewing data, reviewing radiology, discussing with multidisciplinary team, non inclusive of procedures, discussing goals of care with patient/family Acute hypoxic resp failure  Acute encephalopathy  Opoid overdose  Breakthrough seizure  SUSAN  Transaminitis  COPD/ current smoker  Hypothyroid    Neuro: Initial CTH was WNL. Started on Keppra BID and ativan PRN for breakthrough seizure. Check Utox  Cardiovascular: Aim for MAP target 65, titrate levophed as per protocol. Maintain net negative fluid balance   Resp/Chest: On Volume AC. Low tidal volume ARDSnet ventilation strategy, high PEEP. Aim for Pplat < 30. Wean per protocol as tolerated and daily SBT  GI/Nutrition: NPO for now. Started on IV PPI. Bowel regimen as needed  ID: Holding off on Plaquenil and statin given transaminitis Check hepatitis/HIV/ and COVID. f/u BCx. Will start empirically on Azithromycin and Ceftriaxone  Renal: Avoid nephrotoxic agents. Strict I&O  Endocrinology: Maintain Blood sugar < 180. ISS as per protocol. Continue IV synthroid  Haem/Oncology:  DVT ppx w/ HSQ    Case was discussed extensively w/ daughter Mayra # [phone] over the phone    Critical Care time: 40 minutes assessing presenting problems of acute illness that poses high probability of life threatening deterioration or end organ damage/dysfunction.  Medical decision making including Initiating plan of care, reviewing data, reviewing radiology, discussing with multidisciplinary team, non inclusive of procedures, discussing goals of care with patient/family

## 2020-03-31 NOTE — PROGRESS NOTE ADULT - SUBJECTIVE AND OBJECTIVE BOX
PULMONARY/CCM PROGRESS NOTE      JOSE M KEARNEYBRITTANI-[id number]    Patient is a 74y old  Female who presents with a chief complaint of Resp failure and seizure (31 Mar 2020 01:20)      HISTORY OF PRESENT ILLNESS: Hx mild COPD with FEV1 82% pred, adenoCA lung s/p resxn 2018 stage 1A, on home O2, spiriva, symbicort. Also hx hypothyroid was BIBEMS with altered mental status. She was found by her family unresponsive and w/ agonal respirations around 6PM on . She was arousable but hypotensive on initial eval and was found to have multiple fentanyl patches on her which were removed by the ED nursing staff. Narcan was given which she initially responded to but soon after had a non sustained GTCS for ~ 30sec which responded to 2 mg Ativan. Intubated for airway protection. LDH, CRP and ferritin were elevated and pt is currently being r/o COVID infection. CXR w/ L retrocardiac opacity. RVP negative.    On vent via ETT. Sedated.       MEDICATIONS  (STANDING):  azithromycin  IVPB      cefTRIAXone   IVPB 1000 milliGRAM(s) IV Intermittent every 24 hours  chlorhexidine 0.12% Liquid 15 milliLiter(s) Oral Mucosa every 12 hours  enoxaparin Injectable 30 milliGRAM(s) SubCutaneous daily  fentaNYL   Infusion. 0.5 MICROgram(s)/kG/Hr (3.25 mL/Hr) IV Continuous <Continuous>  levETIRAcetam  IVPB 500 milliGRAM(s) IV Intermittent every 12 hours  levothyroxine Injectable 50 MICROGram(s) IV Push at bedtime  norepinephrine Infusion 0.05 MICROgram(s)/kG/Min (6.09 mL/Hr) IV Continuous <Continuous>  pantoprazole  Injectable 40 milliGRAM(s) IV Push daily      MEDICATIONS  (PRN):  ALBUTerol    90 MICROgram(s) HFA Inhaler 1 Puff(s) Inhalation every 4 hours PRN Bronchospasm  LORazepam   Injectable 2 milliGRAM(s) IV Push every 6 hours PRN Self-Injurious behavior      Allergies    No Known Allergies    Intolerances        PAST MEDICAL & SURGICAL HISTORY:  Lung nodule  COPD (chronic obstructive pulmonary disease)  Hyperlipidemia  Hypothyroid  Chronic knee pain  S/P hysterectomy  S/P  section      FAMILY HISTORY:  No pertinent family history in first degree relatives      SOCIAL HISTORY  Smoking History: former smoker per chart    REVIEW OF SYSTEMS:    not available    Vital Signs Last 24 Hrs  T(C): 37 (31 Mar 2020 08:29), Max: 38 (30 Mar 2020 18:37)  T(F): 98.6 (31 Mar 2020 08:29), Max: 100.4 (30 Mar 2020 18:37)  HR: 71 (31 Mar 2020 08:29) (68 - 112)  BP: 98/66 (31 Mar 2020 09:33) (79/47 - 160/84)  BP(mean): 68 (31 Mar 2020 07:31) (59 - 85)  RR: 22 (31 Mar 2020 08:33) (14 - 32)  SpO2: 96% (31 Mar 2020 08:33) (90% - 100%)    PHYSICAL EXAMINATION:    GENERAL: The patient is n no apparent distress.     HEENT: Head is normocephalic and atraumatic.   Mucous membranes are moist.     NECK: ETT    LUNGS: Crackles b/l, no wheeze. Respirations unlabored    HEART: Regular rate and rhythm     ABDOMEN: Soft, nontender, and nondistended.      EXTREMITIES: Without any cyanosis, clubbing, rash, lesions or edema.    NEUROLOGIC: sedated      LABS:                        14.4   12.63 )-----------( 173      ( 30 Mar 2020 18:51 )             46.3     03-30    Basic Metabolic Panel (18 @ 05:51)    Sodium, Serum: 142 mmol/L    Potassium, Serum: 4.3 mmol/L    Chloride, Serum: 102 mmol/L    Carbon Dioxide, Serum: 25.0 mmol/L    Anion Gap, Serum: 15 mmol/L    Blood Urea Nitrogen, Serum: 16.0 mg/dL    Creatinine, Serum: 0.66 mg/dL    Glucose, Serum: 148 mg/dL    Calcium, Total Serum: 9.8 mg/dL    eGFR if Non : 88: Interpretative comment        x   |  x   |  x   ----------------------------<  x   5.4<H>   |  x   |  x     Ca    9.0      30 Mar 2020 18:51    TPro  6.8  /  Alb  3.8  /  TBili  0.9  /  DBili  x   /  AST  1260<H>  /  ALT  928<H>  /  AlkPhos  102  03-30    PT/INR - ( 30 Mar 2020 18:51 )   PT: 14.4 sec;   INR: 1.27 ratio         PTT - ( 30 Mar 2020 18:51 )  PTT:32.1 sec    ABG - ( 30 Mar 2020 23:28 )  pH, Arterial: 7.28  pH, Blood: x     /  pCO2: 56    /  pO2: 62    / HCO3: 23    / Base Excess: -1.2  /  SaO2: 93        D-Dimer Assay, Quantitative: 1022 ng/mL DDU (20 @ 18:51)        Procalcitonin, Serum: 2.51 ng/mL (20 @ 18:51)      MICROBIOLOGY:  FLU A B RSV Detection by PCR (20 @ 22:31)    Flu A Result: [facility]: The Flu A B RSV assay is a Real-Time PCR test for the qualitative  detection and differentiation of Influenza A, Influenza B, and  Respiratory Syncytial Virus on nasopharyngeal swabs. The results should  be interpreted in the context of all clinical and laboratory findings.    Flu B Result: [facility]    RSV [facility]: [facility]        RADIOLOGY & ADDITIONAL STUDIES:  < from: CT Head No Cont (20 @ 19:52) >     EXAM:  CT BRAIN                          PROCEDURE DATE:  2020          INTERPRETATION:  CLINICAL STATEMENT: Altered mental status    TECHNIQUE: CT of the head was performed without IV contrast.    COMPARISON: None.    FINDINGS:  There is mild diffuse parenchymal volume loss. There are areas of low attenuation in the periventricular subcortical white matter likely related to mild chronic microvascular ischemic changes.    There is no acute intracranial hemorrhage, parenchymal mass, mass effect or midline shift. There is no acute territorial infarct. There is no hydrocephalus. Nonspecific prominence bilateral nerve sheath.    The cranium is intact. Left frontal soft tissue swelling     Mucosal thickening paranasal sinuses    Nonspecific linear hyperdensity partially visualized on image 1 at the level of the oral cavity..    Asymmetric prominent left superior ophthalmic vein noted    IMPRESSION:  No acute intracranial hemorrhage or acute territorial infarct.  If symptoms persist, follow-up MRI exam recommended.    Nonspecific incidental asymmetric prominent left superior ophthalmic vein.    Nonspecific linear hyperdensity partially visualized at the level of oral cavity on image 1. Correlate clinically                MERCEDEZ MCCARTNEY, ATTENDING RADIOLOGIST    < end of copied text >  < from: Xray Chest 1 View AP/PA. (20 @ 19:05) >     EXAM:  XR CHEST AP OR PA 1V                          PROCEDURE DATE:  2020          INTERPRETATION:  Portable chest radiograph        CLINICAL INFORMATION:   Short of breath. Cough, fever    TECHNIQUE:  Portable  AP view of the chest was obtained.    COMPARISON: No previous examinations are available for review.    FINDINGS:    ET tube tip above tracheal bifurcation.  NG tube tip beyond GE junction.   Patient is rotated towards the LEFT.  LEFT elevated hemidiaphragm with ill-defined opacity in LEFT retrocardiac parenchyma. Lateral radiograph recommended. LEFT upper lobe and RIGHT lung parenchyma clear.    The heart and mediastinum are within normal limits.    Visualized osseous structures are intact.        IMPRESSION:     Elevated LEFT hemidiaphragm with suspected LEFT lower lobe retrocardiac airspace consolidation obscuring LEFT aortic line..  Follow-up straight AP chest radiograph and/or lateral radiograph suggested.                  PALLAVI JASSO M.D., ATTENDING RADIOLOGIST    < end of copied text >

## 2020-03-31 NOTE — H&P ADULT - NSICDXPASTMEDICALHX_GEN_ALL_CORE_FT
PAST MEDICAL HISTORY:  Chronic knee pain     COPD (chronic obstructive pulmonary disease)     Hyperlipidemia     Hypothyroid     Lung nodule

## 2020-03-31 NOTE — H&P ADULT - HISTORY OF PRESENT ILLNESS
74F current smoker w/ PMHx COPD, lung nodule s/p wedge resection, hypothyroid was BIBEMS with altered mental status. She was found by her family unresponsive and w/ agonal respirations around 6PM on 03/20. She was arousable but hypotensive on initial eval and was found to have multiple fentanyl patches on her which were removed by the ED nursing staff. Narcan was given which she initially responded to but soon after had a non sustained GTCS for ~ 30sec which responded to 2 mg Ativan. Intubated for airway protection. LDH, CRP and ferritin were elevated and pt is currently being r/o COVID infection. CXR w/ L retrocardiac opacity 74F current smoker w/ PMHx COPD, lung nodule s/p wedge resection, hypothyroid was BIBEMS with altered mental status. She was found by her family unresponsive and w/ agonal respirations around 6PM on 03/20. She was arousable but hypotensive on initial eval and was found to have multiple fentanyl patches on her which were removed by the ED nursing staff. Narcan was given which she initially responded to but soon after had a non sustained GTCS for ~ 30sec which responded to 2 mg Ativan. Intubated for airway protection. LDH, CRP and ferritin were elevated and pt is currently being r/o COVID infection. CXR w/ L retrocardiac opacity. RVP negative

## 2020-04-01 LAB
ALBUMIN SERPL ELPH-MCNC: 2.5 G/DL — LOW (ref 3.3–5.2)
ALBUMIN SERPL ELPH-MCNC: 2.6 G/DL — LOW (ref 3.3–5.2)
ALP SERPL-CCNC: 104 U/L — SIGNIFICANT CHANGE UP (ref 40–120)
ALP SERPL-CCNC: 97 U/L — SIGNIFICANT CHANGE UP (ref 40–120)
ALT FLD-CCNC: 1297 U/L — HIGH
ALT FLD-CCNC: 1364 U/L — HIGH
AMPHET UR-MCNC: NEGATIVE — SIGNIFICANT CHANGE UP
ANION GAP SERPL CALC-SCNC: 21 MMOL/L — HIGH (ref 5–17)
ANION GAP SERPL CALC-SCNC: 23 MMOL/L — HIGH (ref 5–17)
APPEARANCE UR: ABNORMAL
AST SERPL-CCNC: 578 U/L — HIGH
AST SERPL-CCNC: 889 U/L — HIGH
BACTERIA # UR AUTO: ABNORMAL
BARBITURATES UR SCN-MCNC: NEGATIVE — SIGNIFICANT CHANGE UP
BASE EXCESS BLDA CALC-SCNC: -2.5 MMOL/L — LOW (ref -2–2)
BENZODIAZ UR-MCNC: NEGATIVE — SIGNIFICANT CHANGE UP
BILIRUB DIRECT SERPL-MCNC: 0.1 MG/DL — SIGNIFICANT CHANGE UP (ref 0–0.3)
BILIRUB INDIRECT FLD-MCNC: <0.1 MG/DL — LOW (ref 0.2–1)
BILIRUB SERPL-MCNC: 0.2 MG/DL — LOW (ref 0.4–2)
BILIRUB SERPL-MCNC: <0.2 MG/DL — LOW (ref 0.4–2)
BILIRUB UR-MCNC: NEGATIVE — SIGNIFICANT CHANGE UP
BLOOD GAS COMMENTS ARTERIAL: SIGNIFICANT CHANGE UP
BUN SERPL-MCNC: 49 MG/DL — HIGH (ref 8–20)
BUN SERPL-MCNC: 56 MG/DL — HIGH (ref 8–20)
CALCIUM SERPL-MCNC: 7.1 MG/DL — LOW (ref 8.6–10.2)
CALCIUM SERPL-MCNC: 7.6 MG/DL — LOW (ref 8.6–10.2)
CHLORIDE SERPL-SCNC: 92 MMOL/L — LOW (ref 98–107)
CHLORIDE SERPL-SCNC: 93 MMOL/L — LOW (ref 98–107)
CHOLEST SERPL-MCNC: 106 MG/DL — LOW (ref 110–199)
CO2 SERPL-SCNC: 16 MMOL/L — LOW (ref 22–29)
CO2 SERPL-SCNC: 19 MMOL/L — LOW (ref 22–29)
COCAINE METAB.OTHER UR-MCNC: NEGATIVE — SIGNIFICANT CHANGE UP
COLOR SPEC: YELLOW — SIGNIFICANT CHANGE UP
COMMENT - URINE: SIGNIFICANT CHANGE UP
CREAT SERPL-MCNC: 4.12 MG/DL — HIGH (ref 0.5–1.3)
CREAT SERPL-MCNC: 4.61 MG/DL — HIGH (ref 0.5–1.3)
DIFF PNL FLD: ABNORMAL
EPI CELLS # UR: SIGNIFICANT CHANGE UP
GAS PNL BLDA: SIGNIFICANT CHANGE UP
GLUCOSE BLDC GLUCOMTR-MCNC: 110 MG/DL — HIGH (ref 70–99)
GLUCOSE BLDC GLUCOMTR-MCNC: 135 MG/DL — HIGH (ref 70–99)
GLUCOSE BLDC GLUCOMTR-MCNC: 145 MG/DL — HIGH (ref 70–99)
GLUCOSE BLDC GLUCOMTR-MCNC: 155 MG/DL — HIGH (ref 70–99)
GLUCOSE BLDC GLUCOMTR-MCNC: 178 MG/DL — HIGH (ref 70–99)
GLUCOSE BLDC GLUCOMTR-MCNC: 178 MG/DL — HIGH (ref 70–99)
GLUCOSE BLDC GLUCOMTR-MCNC: 180 MG/DL — HIGH (ref 70–99)
GLUCOSE BLDC GLUCOMTR-MCNC: 216 MG/DL — HIGH (ref 70–99)
GLUCOSE BLDC GLUCOMTR-MCNC: 246 MG/DL — HIGH (ref 70–99)
GLUCOSE BLDC GLUCOMTR-MCNC: 273 MG/DL — HIGH (ref 70–99)
GLUCOSE BLDC GLUCOMTR-MCNC: 308 MG/DL — HIGH (ref 70–99)
GLUCOSE BLDC GLUCOMTR-MCNC: 334 MG/DL — HIGH (ref 70–99)
GLUCOSE BLDC GLUCOMTR-MCNC: 379 MG/DL — HIGH (ref 70–99)
GLUCOSE BLDC GLUCOMTR-MCNC: 389 MG/DL — HIGH (ref 70–99)
GLUCOSE BLDC GLUCOMTR-MCNC: 400 MG/DL — HIGH (ref 70–99)
GLUCOSE BLDC GLUCOMTR-MCNC: 408 MG/DL — HIGH (ref 70–99)
GLUCOSE BLDC GLUCOMTR-MCNC: 418 MG/DL — HIGH (ref 70–99)
GLUCOSE BLDC GLUCOMTR-MCNC: 454 MG/DL — CRITICAL HIGH (ref 70–99)
GLUCOSE SERPL-MCNC: 386 MG/DL — HIGH (ref 70–99)
GLUCOSE SERPL-MCNC: 453 MG/DL — HIGH (ref 70–99)
GLUCOSE UR QL: NEGATIVE MG/DL — SIGNIFICANT CHANGE UP
HBV SURFACE AB SER-ACNC: SIGNIFICANT CHANGE UP
HBV SURFACE AG SER-ACNC: SIGNIFICANT CHANGE UP
HCO3 BLDA-SCNC: 22 MMOL/L — SIGNIFICANT CHANGE UP (ref 20–26)
HCT VFR BLD CALC: 38.4 % — SIGNIFICANT CHANGE UP (ref 34.5–45)
HCV AB S/CO SERPL IA: 0.18 S/CO — SIGNIFICANT CHANGE UP (ref 0–0.99)
HCV AB SERPL-IMP: SIGNIFICANT CHANGE UP
HDLC SERPL-MCNC: 31 MG/DL — LOW
HGB BLD-MCNC: 11.9 G/DL — SIGNIFICANT CHANGE UP (ref 11.5–15.5)
HIV 1 & 2 AB SERPL IA.RAPID: SIGNIFICANT CHANGE UP
HOROWITZ INDEX BLDA+IHG-RTO: 0.4 — SIGNIFICANT CHANGE UP
INR BLD: 1.39 RATIO — HIGH (ref 0.88–1.16)
KETONES UR-MCNC: ABNORMAL
LACTATE BLDV-MCNC: 3.1 MMOL/L — HIGH (ref 0.5–2)
LEUKOCYTE ESTERASE UR-ACNC: ABNORMAL
LIPID PNL WITH DIRECT LDL SERPL: 27 MG/DL — SIGNIFICANT CHANGE UP
MAGNESIUM SERPL-MCNC: 1.8 MG/DL — SIGNIFICANT CHANGE UP (ref 1.6–2.6)
MAGNESIUM SERPL-MCNC: 1.9 MG/DL — SIGNIFICANT CHANGE UP (ref 1.6–2.6)
MCHC RBC-ENTMCNC: 31 GM/DL — LOW (ref 32–36)
MCHC RBC-ENTMCNC: 31.7 PG — SIGNIFICANT CHANGE UP (ref 27–34)
MCV RBC AUTO: 102.4 FL — HIGH (ref 80–100)
METHADONE UR-MCNC: NEGATIVE — SIGNIFICANT CHANGE UP
NITRITE UR-MCNC: NEGATIVE — SIGNIFICANT CHANGE UP
OPIATES UR-MCNC: POSITIVE
PCO2 BLDA: 47 MMHG — HIGH (ref 35–45)
PCP SPEC-MCNC: SIGNIFICANT CHANGE UP
PCP UR-MCNC: NEGATIVE — SIGNIFICANT CHANGE UP
PH BLDA: 7.31 — LOW (ref 7.35–7.45)
PH UR: 5 — SIGNIFICANT CHANGE UP (ref 5–8)
PHOSPHATE SERPL-MCNC: 4 MG/DL — SIGNIFICANT CHANGE UP (ref 2.4–4.7)
PHOSPHATE SERPL-MCNC: 6.7 MG/DL — HIGH (ref 2.4–4.7)
PLATELET # BLD AUTO: 90 K/UL — LOW (ref 150–400)
PO2 BLDA: 100 MMHG — SIGNIFICANT CHANGE UP (ref 83–108)
POTASSIUM SERPL-MCNC: 3.8 MMOL/L — SIGNIFICANT CHANGE UP (ref 3.5–5.3)
POTASSIUM SERPL-MCNC: 4.4 MMOL/L — SIGNIFICANT CHANGE UP (ref 3.5–5.3)
POTASSIUM SERPL-SCNC: 3.8 MMOL/L — SIGNIFICANT CHANGE UP (ref 3.5–5.3)
POTASSIUM SERPL-SCNC: 4.4 MMOL/L — SIGNIFICANT CHANGE UP (ref 3.5–5.3)
PROT SERPL-MCNC: 5.1 G/DL — LOW (ref 6.6–8.7)
PROT SERPL-MCNC: 5.5 G/DL — LOW (ref 6.6–8.7)
PROT UR-MCNC: 100 MG/DL
PROTHROM AB SERPL-ACNC: 15.9 SEC — HIGH (ref 10–12.9)
RBC # BLD: 3.75 M/UL — LOW (ref 3.8–5.2)
RBC # FLD: 14.6 % — HIGH (ref 10.3–14.5)
RBC CASTS # UR COMP ASSIST: ABNORMAL /HPF (ref 0–4)
SAO2 % BLDA: 98 % — SIGNIFICANT CHANGE UP (ref 95–99)
SARS-COV-2 RNA SPEC QL NAA+PROBE: SIGNIFICANT CHANGE UP
SODIUM SERPL-SCNC: 130 MMOL/L — LOW (ref 135–145)
SODIUM SERPL-SCNC: 134 MMOL/L — LOW (ref 135–145)
SP GR SPEC: 1.02 — SIGNIFICANT CHANGE UP (ref 1.01–1.02)
THC UR QL: NEGATIVE — SIGNIFICANT CHANGE UP
TOTAL CHOLESTEROL/HDL RATIO MEASUREMENT: 3 RATIO — LOW (ref 3.3–7.1)
TRIGL SERPL-MCNC: 241 MG/DL — HIGH (ref 10–200)
TSH SERPL-MCNC: 0.23 UIU/ML — LOW (ref 0.27–4.2)
UROBILINOGEN FLD QL: 1 MG/DL
WBC # BLD: 12.44 K/UL — HIGH (ref 3.8–10.5)
WBC # FLD AUTO: 12.44 K/UL — HIGH (ref 3.8–10.5)
WBC UR QL: SIGNIFICANT CHANGE UP

## 2020-04-01 PROCEDURE — 99291 CRITICAL CARE FIRST HOUR: CPT

## 2020-04-01 PROCEDURE — 99233 SBSQ HOSP IP/OBS HIGH 50: CPT

## 2020-04-01 RX ORDER — SODIUM BICARBONATE 1 MEQ/ML
0.18 SYRINGE (ML) INTRAVENOUS
Qty: 150 | Refills: 0 | Status: DISCONTINUED | OUTPATIENT
Start: 2020-04-01 | End: 2020-04-02

## 2020-04-01 RX ORDER — INSULIN HUMAN 100 [IU]/ML
12 INJECTION, SOLUTION SUBCUTANEOUS
Qty: 100 | Refills: 0 | Status: DISCONTINUED | OUTPATIENT
Start: 2020-04-01 | End: 2020-04-02

## 2020-04-01 RX ORDER — ACETYLCYSTEINE 200 MG/ML
10 VIAL (ML) MISCELLANEOUS ONCE
Refills: 0 | Status: COMPLETED | OUTPATIENT
Start: 2020-04-03 | End: 2020-04-03

## 2020-04-01 RX ORDER — ACETYLCYSTEINE 200 MG/ML
0.51 VIAL (ML) MISCELLANEOUS ONCE
Refills: 0 | Status: DISCONTINUED | OUTPATIENT
Start: 2020-04-01 | End: 2020-04-01

## 2020-04-01 RX ORDER — ACETYLCYSTEINE 200 MG/ML
12 VIAL (ML) MISCELLANEOUS ONCE
Refills: 0 | Status: COMPLETED | OUTPATIENT
Start: 2020-04-02 | End: 2020-04-01

## 2020-04-01 RX ORDER — FENTANYL CITRATE 50 UG/ML
50 INJECTION INTRAVENOUS
Refills: 0 | Status: DISCONTINUED | OUTPATIENT
Start: 2020-04-01 | End: 2020-04-02

## 2020-04-01 RX ORDER — HEPARIN SODIUM 5000 [USP'U]/ML
600 INJECTION INTRAVENOUS; SUBCUTANEOUS
Qty: 10000 | Refills: 0 | Status: DISCONTINUED | OUTPATIENT
Start: 2020-04-01 | End: 2020-04-02

## 2020-04-01 RX ORDER — ACETYLCYSTEINE 200 MG/ML
1.02 VIAL (ML) MISCELLANEOUS ONCE
Refills: 0 | Status: DISCONTINUED | OUTPATIENT
Start: 2020-04-01 | End: 2020-04-01

## 2020-04-01 RX ORDER — SODIUM CHLORIDE 9 MG/ML
1000 INJECTION, SOLUTION INTRAVENOUS ONCE
Refills: 0 | Status: COMPLETED | OUTPATIENT
Start: 2020-04-01 | End: 2020-04-01

## 2020-04-01 RX ORDER — ACETYLCYSTEINE 200 MG/ML
4.1 VIAL (ML) MISCELLANEOUS ONCE
Refills: 0 | Status: COMPLETED | OUTPATIENT
Start: 2020-04-01 | End: 2020-04-01

## 2020-04-01 RX ORDER — ACETYLCYSTEINE 200 MG/ML
12 VIAL (ML) MISCELLANEOUS ONCE
Refills: 0 | Status: COMPLETED | OUTPATIENT
Start: 2020-04-01 | End: 2020-04-01

## 2020-04-01 RX ORDER — ACETYLCYSTEINE 200 MG/ML
12 VIAL (ML) MISCELLANEOUS ONCE
Refills: 0 | Status: COMPLETED | OUTPATIENT
Start: 2020-04-02 | End: 2020-04-02

## 2020-04-01 RX ORDER — DEXMEDETOMIDINE HYDROCHLORIDE IN 0.9% SODIUM CHLORIDE 4 UG/ML
0.2 INJECTION INTRAVENOUS
Qty: 200 | Refills: 0 | Status: DISCONTINUED | OUTPATIENT
Start: 2020-04-01 | End: 2020-04-02

## 2020-04-01 RX ORDER — ACETYLCYSTEINE 200 MG/ML
12 VIAL (ML) MISCELLANEOUS ONCE
Refills: 0 | Status: COMPLETED | OUTPATIENT
Start: 2020-04-01 | End: 2020-04-02

## 2020-04-01 RX ORDER — INSULIN HUMAN 100 [IU]/ML
12 INJECTION, SOLUTION SUBCUTANEOUS ONCE
Refills: 0 | Status: COMPLETED | OUTPATIENT
Start: 2020-04-01 | End: 2020-04-01

## 2020-04-01 RX ORDER — HEPARIN SODIUM 5000 [USP'U]/ML
600 INJECTION INTRAVENOUS; SUBCUTANEOUS
Qty: 10000 | Refills: 0 | Status: DISCONTINUED | OUTPATIENT
Start: 2020-04-01 | End: 2020-04-01

## 2020-04-01 RX ADMIN — CHLORHEXIDINE GLUCONATE 15 MILLILITER(S): 213 SOLUTION TOPICAL at 03:52

## 2020-04-01 RX ADMIN — DEXMEDETOMIDINE HYDROCHLORIDE IN 0.9% SODIUM CHLORIDE 4.08 MICROGRAM(S)/KG/HR: 4 INJECTION INTRAVENOUS at 09:10

## 2020-04-01 RX ADMIN — LEVETIRACETAM 420 MILLIGRAM(S): 250 TABLET, FILM COATED ORAL at 18:07

## 2020-04-01 RX ADMIN — FENTANYL CITRATE 50 MICROGRAM(S): 50 INJECTION INTRAVENOUS at 11:15

## 2020-04-01 RX ADMIN — INSULIN HUMAN 12 UNIT(S)/HR: 100 INJECTION, SOLUTION SUBCUTANEOUS at 16:02

## 2020-04-01 RX ADMIN — Medication 400 MILLIGRAM(S): at 00:23

## 2020-04-01 RX ADMIN — PANTOPRAZOLE SODIUM 40 MILLIGRAM(S): 20 TABLET, DELAYED RELEASE ORAL at 11:04

## 2020-04-01 RX ADMIN — Medication 100 MEQ/KG/HR: at 22:00

## 2020-04-01 RX ADMIN — Medication 130.13 GRAM(S): at 06:29

## 2020-04-01 RX ADMIN — MIDODRINE HYDROCHLORIDE 10 MILLIGRAM(S): 2.5 TABLET ORAL at 15:09

## 2020-04-01 RX ADMIN — FENTANYL CITRATE 50 MICROGRAM(S): 50 INJECTION INTRAVENOUS at 06:31

## 2020-04-01 RX ADMIN — Medication 200 MILLIGRAM(S): at 15:12

## 2020-04-01 RX ADMIN — MIDODRINE HYDROCHLORIDE 10 MILLIGRAM(S): 2.5 TABLET ORAL at 05:01

## 2020-04-01 RX ADMIN — CHLORHEXIDINE GLUCONATE 15 MILLILITER(S): 213 SOLUTION TOPICAL at 18:08

## 2020-04-01 RX ADMIN — INSULIN HUMAN 12 UNIT(S)/HR: 100 INJECTION, SOLUTION SUBCUTANEOUS at 06:28

## 2020-04-01 RX ADMIN — Medication 44.17 GRAM(S): at 11:07

## 2020-04-01 RX ADMIN — INSULIN HUMAN 12 UNIT(S): 100 INJECTION, SOLUTION SUBCUTANEOUS at 06:26

## 2020-04-01 RX ADMIN — Medication 50 MICROGRAM(S): at 23:00

## 2020-04-01 RX ADMIN — AZITHROMYCIN 250 MILLIGRAM(S): 500 TABLET, FILM COATED ORAL at 01:21

## 2020-04-01 RX ADMIN — Medication 100 MEQ/KG/HR: at 08:42

## 2020-04-01 RX ADMIN — DEXMEDETOMIDINE HYDROCHLORIDE IN 0.9% SODIUM CHLORIDE 4.08 MICROGRAM(S)/KG/HR: 4 INJECTION INTRAVENOUS at 21:00

## 2020-04-01 RX ADMIN — Medication 310 GRAM(S): at 04:58

## 2020-04-01 RX ADMIN — FENTANYL CITRATE 50 MICROGRAM(S): 50 INJECTION INTRAVENOUS at 15:57

## 2020-04-01 RX ADMIN — ENOXAPARIN SODIUM 30 MILLIGRAM(S): 100 INJECTION SUBCUTANEOUS at 11:04

## 2020-04-01 RX ADMIN — SODIUM CHLORIDE 2000 MILLILITER(S): 9 INJECTION, SOLUTION INTRAVENOUS at 01:50

## 2020-04-01 RX ADMIN — Medication 44.17 GRAM(S): at 22:00

## 2020-04-01 RX ADMIN — CEFTRIAXONE 100 MILLIGRAM(S): 500 INJECTION, POWDER, FOR SOLUTION INTRAMUSCULAR; INTRAVENOUS at 08:35

## 2020-04-01 RX ADMIN — LEVETIRACETAM 420 MILLIGRAM(S): 250 TABLET, FILM COATED ORAL at 05:01

## 2020-04-01 NOTE — CONSULT NOTE ADULT - SUBJECTIVE AND OBJECTIVE BOX
[facility] DIVISION OF KIDNEY DISEASES AND HYPERTENSION -- INITIAL CONSULT NOTE  --------------------------------------------------------------------------------  HPI:        PAST HISTORY  --------------------------------------------------------------------------------  PAST MEDICAL & SURGICAL HISTORY:  Lung nodule  COPD (chronic obstructive pulmonary disease)  Hyperlipidemia  Hypothyroid  Chronic knee pain  S/P hysterectomy  S/P  section    FAMILY HISTORY:  No pertinent family history in first degree relatives    PAST SOCIAL HISTORY:    ALLERGIES & MEDICATIONS  --------------------------------------------------------------------------------  Allergies    No Known Allergies    Intolerances      Standing Inpatient Medications  acetylcysteine IVPB 12 Gram(s) IV Intermittent once  acetylcysteine IVPB 12 Gram(s) IV Intermittent once  ALBUTerol    90 MICROgram(s) HFA Inhaler 2 Puff(s) Inhalation every 6 hours  azithromycin  IVPB 250 milliGRAM(s) IV Intermittent every 24 hours  azithromycin  IVPB      cefTRIAXone   IVPB 1000 milliGRAM(s) IV Intermittent every 24 hours  chlorhexidine 0.12% Liquid 15 milliLiter(s) Oral Mucosa every 12 hours  dexMEDEtomidine Infusion 0.2 MICROgram(s)/kG/Hr IV Continuous <Continuous>  enoxaparin Injectable 30 milliGRAM(s) SubCutaneous daily  hydroxychloroquine   Oral   hydroxychloroquine 200 milliGRAM(s) Oral every 12 hours  insulin regular Infusion 12 Unit(s)/Hr IV Continuous <Continuous>  ipratropium 17 MICROgram(s) HFA Inhaler 1 Puff(s) Inhalation every 6 hours  levETIRAcetam  IVPB 500 milliGRAM(s) IV Intermittent every 12 hours  levothyroxine Injectable 50 MICROGram(s) IV Push at bedtime  midodrine 10 milliGRAM(s) Oral every 8 hours  norepinephrine Infusion 0.05 MICROgram(s)/kG/Min IV Continuous <Continuous>  pantoprazole  Injectable 40 milliGRAM(s) IV Push daily  sodium bicarbonate  Infusion 0.184 mEq/kG/Hr IV Continuous <Continuous>    PRN Inpatient Medications  ALBUTerol    90 MICROgram(s) HFA Inhaler 1 Puff(s) Inhalation every 4 hours PRN  fentaNYL    Injectable 50 MICROGram(s) IV Push every 2 hours PRN  LORazepam   Injectable 2 milliGRAM(s) IV Push every 6 hours PRN      REVIEW OF SYSTEMS  --------------------------------------------------------------------------------  Gen: No weight changes, fatigue, fevers/chills, weakness  Skin: No rashes  Head/Eyes/Ears/Mouth: No headache; Normal hearing; Normal vision w/o blurriness; No sinus pain/discomfort, sore throat  Respiratory: No dyspnea, cough, wheezing, hemoptysis  CV: No chest pain, PND, orthopnea  GI: No abdominal pain, diarrhea, constipation, nausea, vomiting, melena, hematochezia  : No increased frequency, dysuria, hematuria, nocturia  MSK: No joint pain/swelling; no back pain; no edema  Neuro: No dizziness/lightheadedness, weakness, seizures, numbness, tingling  Heme: No easy bruising or bleeding  Endo: No heat/cold intolerance  Psych: No significant nervousness, anxiety, stress, depression    All other systems were reviewed and are negative, except as noted.    VITALS/PHYSICAL EXAM  --------------------------------------------------------------------------------  T(C): 37 (20 @ 11:00), Max: 37.5 (20 @ 20:00)  HR: 88 (20:) (82 - 132)  BP: 108/54 (20 @ 16:00) (108/54 - 108/54)  RR: 23 (20 11:00) (20 - 23)  SpO2: 99% (20:) (92% - 99%)  Wt(kg): --  Height (cm): 162.6 (20 @ 08:29)  Weight (kg): 81.6 (20 20:00)  BMI (kg/m2): 30.9 (20:00)  BSA (m2): 1.87 (20 20:00)      20 @ 07:  -  20 @ 07:00  --------------------------------------------------------  IN: 2938.6 mL / OUT: 795 mL / NET: 2143.6 mL    20 @ 07:01  -  20 @ 12:59  --------------------------------------------------------  IN: 0 mL / OUT: 35 mL / NET: -35 mL      Physical Exam:  	Gen: NAD, well-appearing  	HEENT: PERRL, supple neck, clear oropharynx  	Pulm: CTA B/L  	CV: RRR, S1S2; no rub  	Back: No spinal or CVA tenderness; no sacral edema  	Abd: +BS, soft, nontender/nondistended  	: No suprapubic tenderness  	UE: Warm, FROM, no clubbing, intact strength; no edema; no asterixis  	LE: Warm, FROM, no clubbing, intact strength; no edema  	Neuro: No focal deficits, intact gait  	Psych: Normal affect and mood  	Skin: Warm, without rashes  	Vascular access:    LABS/STUDIES  --------------------------------------------------------------------------------              11.9   12.44 >-----------<  90       [20 02:49]              38.4     134  |  92  |  56.0  ----------------------------<  386      [20 10:51]  3.8   |  19.0  |  4.61        Ca     7.6     [20 10:51]      Mg     1.9     [20 10:51]      Phos  4.0     [20 10:51]    TPro  5.5  /  Alb  2.6  /  TBili  <0.2  /  DBili  0.1  /  AST  578  /  ALT  1297  /  AlkPhos  97  [20 10:51]    PT/INR: PT 15.9 , INR 1.39       [20 04:49]  PTT: 32.1       [20 18:51]    LDH 1863      [20 18:51]    Creatinine Trend:  SCr 4.61 [04-01 @ 10:51]  SCr 4.12 [:49]  SCr 3.70 [ 19:29]  SCr 2.44 [ 16:06]  SCr 2.02 [ 18:51]    Urinalysis - [20 @ 06:36]      Color Yellow / Appearance Slightly Turbid / SG 1.020 / pH 5.0      Gluc Negative / Ketone Trace  / Bili Negative / Urobili 1       Blood Large / Protein 100 / Leuk Est Trace / Nitrite Negative      RBC 11-25 / WBC 3-5 / Hyaline  / Gran  / Sq Epi  / Non Sq Epi Occasional / Bacteria Moderate      Ferritin 3576      [20 @ 18:51]  TSH 0.23      [20 @ 02:49]  Lipid: chol 106, , HDL 31, LDL 27      [20 @ 02:49]    HBsAb Nonreact      [20 @ 07:33]  HBsAg Nonreact      [20 @ 07:33]  HCV 0.18, Nonreact      [20 @ 07:33]  HIV Nonreact      [20 @ 02:49] [facility] DIVISION OF KIDNEY DISEASES AND HYPERTENSION -- INITIAL CONSULT NOTE  --------------------------------------------------------------------------------  HPI:  74F current smoker w/ PMHx COPD, lung nodule s/p wedge resection, hypothyroid was BIBEMS with altered mental status. She was found by her family unresponsive and w/ agonal respirations around 6PM on . She was arousable but hypotensive on initial eval and was found to have multiple fentanyl patches on her which were removed by the ED nursing staff.  Narcan was given which she initially responded to but soon after had a non sustained GTCS for ~ 30sec which responded to 2 mg Ativan. Intubated for airway protection. LDH, CRP and ferritin were elevated and pt is currently being r/o COVID infection. CXR w/ L retrocardiac opacity. RVP negative    On full ventilatory support and vasopressor medication    PAST HISTORY  --------------------------------------------------------------------------------  PAST MEDICAL & SURGICAL HISTORY:  Lung nodule  COPD (chronic obstructive pulmonary disease)  Hyperlipidemia  Hypothyroid  Chronic knee pain  S/P hysterectomy  S/P  section    FAMILY HISTORY:  No pertinent family history in first degree relatives    PAST SOCIAL HISTORY:  Current long time smoker    ALLERGIES & MEDICATIONS  --------------------------------------------------------------------------------  Allergies  No Known Allergies    Standing Inpatient Medications  acetylcysteine IVPB 12 Gram(s) IV Intermittent once  acetylcysteine IVPB 12 Gram(s) IV Intermittent once  ALBUTerol    90 MICROgram(s) HFA Inhaler 2 Puff(s) Inhalation every 6 hours  azithromycin  IVPB 250 milliGRAM(s) IV Intermittent every 24 hours  azithromycin  IVPB      cefTRIAXone   IVPB 1000 milliGRAM(s) IV Intermittent every 24 hours  chlorhexidine 0.12% Liquid 15 milliLiter(s) Oral Mucosa every 12 hours  dexMEDEtomidine Infusion 0.2 MICROgram(s)/kG/Hr IV Continuous <Continuous>  enoxaparin Injectable 30 milliGRAM(s) SubCutaneous daily  hydroxychloroquine   Oral   hydroxychloroquine 200 milliGRAM(s) Oral every 12 hours  insulin regular Infusion 12 Unit(s)/Hr IV Continuous <Continuous>  ipratropium 17 MICROgram(s) HFA Inhaler 1 Puff(s) Inhalation every 6 hours  levETIRAcetam  IVPB 500 milliGRAM(s) IV Intermittent every 12 hours  levothyroxine Injectable 50 MICROGram(s) IV Push at bedtime  midodrine 10 milliGRAM(s) Oral every 8 hours  norepinephrine Infusion 0.05 MICROgram(s)/kG/Min IV Continuous <Continuous>  pantoprazole  Injectable 40 milliGRAM(s) IV Push daily  sodium bicarbonate  Infusion 0.184 mEq/kG/Hr IV Continuous <Continuous>    PRN Inpatient Medications  ALBUTerol    90 MICROgram(s) HFA Inhaler 1 Puff(s) Inhalation every 4 hours PRN  fentaNYL    Injectable 50 MICROGram(s) IV Push every 2 hours PRN  LORazepam   Injectable 2 milliGRAM(s) IV Push every 6 hours PRN      REVIEW OF SYSTEMS  --------------------------------------------------------------------------------  PRAVEENA    VITALS/PHYSICAL EXAM  --------------------------------------------------------------------------------  T(C): 37 (20 11:00), Max: 37.5 (20 @ 20:00)  HR: 88 (20:00) (82 - 132)  BP: 108/54 (20 @ 16:00) (108/54 - 108/54)  RR: 23 (20 11:00) (20 - 23)  SpO2: 99% (20:00) (92% - 99%)    Height (cm): 162.6 (20 @ 08:29)  Weight (kg): 81.6 (20 20:00)  BMI (kg/m2): 30.9 (20:)  BSA (m2): 1.87 (20 20:00)      20 07:01  -  20 @ 07:00  --------------------------------------------------------  IN: 2938.6 mL / OUT: 795 mL / NET: 2143.6 mL    20 @ 07:01  -  20 @ 12:59  --------------------------------------------------------  IN: 0 mL / OUT: 35 mL / NET: -35 mL    Physical Exam:  Due to the nature of this patient's COVID-19 isolation status, no bedside physical exam was done to limit spread of infection. Objective data were reviewed in detail.    LABS/STUDIES  --------------------------------------------------------------------------------              11.9   12.44 >-----------<  90       [20 02:49]              38.4     134  |  92  |  56.0  ----------------------------<  386      [20 10:51]  3.8   |  19.0  |  4.61        Ca     7.6     [20 10:51]      Mg     1.9     [04-01-20 @ 10:51]      Phos  4.0     [20 10:51]    TPro  5.5  /  Alb  2.6  /  TBili  <0.2  /  DBili  0.1  /  AST  578  /  ALT  1297  /  AlkPhos  97  [20 10:51]    PT/INR: PT 15.9 , INR 1.39       [20 04:49]  PTT: 32.1       [20 18:51]    LDH 1863      [20 18:51]    Creatinine Trend:  SCr 4.61 [04-01 @ 10:51]  SCr 4.12 [:49]  SCr 3.70 [ 19:29]  SCr 2.44 [ 16:06]  SCr 2.02 [ 18:51]    Urinalysis - [20 @ 06:36]      Color Yellow / Appearance Slightly Turbid / SG 1.020 / pH 5.0      Gluc Negative / Ketone Trace  / Bili Negative / Urobili 1       Blood Large / Protein 100 / Leuk Est Trace / Nitrite Negative      RBC 11-25 / WBC 3-5 / Hyaline  / Gran  / Sq Epi  / Non Sq Epi Occasional / Bacteria Moderate    Ferritin 3576      [20 18:51]  TSH 0.23      [20 02:49]  Lipid: chol 106, , HDL 31, LDL 27      [20 02:49]    HBsAb Nonreact      [20 @ 07:33]  HBsAg Nonreact      [20 07:33]  HCV 0.18, Nonreact      [20 07:33]  HIV Nonreact      [20 02:49]

## 2020-04-01 NOTE — DIETITIAN INITIAL EVALUATION ADULT. - OTHER INFO
Pt with h/o COPD, lung nodule s/p wedge resection, hypothyroid was BIBEMS with altered mental status. She was found by her family unresponsive and w/ agonal respirations a Intubated for airway protection. LDH, CRP and ferritin were elevated.  It was reported by family to be intentional opiate overdose.  Pt admitted with acute hypoxemic respiratory failure, secondary to severe sepsis likely viral COVID 19 pneumonia (results pending), septic shock, complicated by anuric ATN, acute liver failure likely ischemic hepatitis, now with severe combined respiratory and metabolic acidosis.    Aware order for Jevity bolus feedings initiated (3/31).

## 2020-04-01 NOTE — CONSULT NOTE ADULT - ASSESSMENT
SUSAN- likely ATN  R/O COVID-19  Respiratory failure  Patient is DNR    Creatinine Trend:  SCr 4.61 [04-01 @ 10:51]  SCr 4.12 [04-01 @ 02:49]  SCr 3.70 [03-31 @ 19:29]  SCr 2.44 [03-31 @ 16:06]  SCr 2.02 [03-30 @ 18:51]    Rising serum creatinine  Monitor serum creatinine, Is and Os    Metabolic acidosis  IVF with Sodium bicarbonate  Monitor serum CO2    In context of DNR status, no invasive treatment options will be offered, including CVVHDF    Will sign off, please call with further questions SUSAN- likely ATN  R/O COVID-19  Respiratory failure  Patient is DNR    Creatinine Trend:  SCr 4.61 [04-01 @ 10:51]  SCr 4.12 [04-01 @ 02:49]  SCr 3.70 [03-31 @ 19:29]  SCr 2.44 [03-31 @ 16:06]  SCr 2.02 [03-30 @ 18:51]    Rising serum creatinine  Monitor serum creatinine, Is and Os    Metabolic acidosis  IVF with Sodium bicarbonate  Monitor serum CO2    In context of DNR status, no invasive treatment options will be offered, including CVVHDF    Will sign off, please call with further questions    I was Physically Present for the key portions of the Evaluation & management ( E/M ) Service provided.    I agree with the above History  , Physical examination & Treatment Plans,    I have Reviewed , Modified or appended where appropriate,

## 2020-04-01 NOTE — PROGRESS NOTE ADULT - SUBJECTIVE AND OBJECTIVE BOX
Initiating on CVVHDF,    Box 4. Summary of KDIGO Recommendation Statements: Dialysis Interventions for Treatment of SUSAN  5.1.1: Initiate RRT emergently when life-threatening changes in fluid, electrolyte, and acid-base balance exist. (Not Graded)  5.1.2: Consider the broader clinical context, the presence of conditions that can be modified with RRT, and trends of laboratory  tests—rather than single BUN and creatinine thresholds alone—when making the decision to start RRT. (Not Graded)  5.2.1: Discontinue RRT when it is no longer required, either because intrinsic kidney function has recovered to the point that it is  adequate to meet patient needs, or because RRT is no longer consistent with the goals of care. (Not Graded)  5.2.2: We suggest not using diuretics to enhance kidney function recovery, or to reduce the duration or frequency of RRT. (2B)  5.3.1: In a patient with SUSAN requiring RRT, base the decision to use anticoagulation for RRT on assessment of the patient’s potential  risks and benefits from anticoagulation (see Figure 17). (Not Graded)  5.3.1.1: We recommend using anticoagulation during RRT in SUSAN if a patient does not have an increased bleeding risk or  impaired coagulation and is not already receiving systemic anticoagulation. (1B)  5.3.2: For patients without an increased bleeding risk or impaired coagulation and not already receiving effective systemic  anticoagulation, we suggest the followin.3.2.1: For anticoagulation in intermittent RRT, we recommend using either unfractionated or low-molecular-weight heparin,  rather than other anticoagulants. (1C)  5.3.2.2: For anticoagulation in CRRT, we suggest using regional citrate anticoagulation rather than heparin in patients who do  not have contraindications for citrate. (2B)  5.3.2.3: For anticoagulation during CRRT in patients who have contraindications for citrate, we suggest using either  unfractionated or low-molecular-weight heparin, rather than other anticoagulants. (2C)  5.3.3: For patients with increased bleeding risk who are not receiving anticoagulation, we suggest the following for anticoagulation  during RRT:  5.3.3.1: We suggest using regional citrate anticoagulation, rather than no anticoagulation, during CRRT in a patient without  contraindications for citrate. (2C)  5.3.3.2: We suggest avoiding regional heparinization during CRRT in a patient with increased risk of bleeding. (2C)  5.3.4: In a patient with heparin-induced thrombocytopenia (HIT), all heparin must be stopped and we recommend using direct  thrombin inhibitors (such as argatroban) or Factor Xa inhibitors (such as danaparoid or fondaparinux) rather than other or no  anticoagulation during RRT. (1A)  5.3.4.1: In a patient with HIT who does not have severe liver failure, we suggest using argatroban rather than other thrombin  or Factor Xa inhibitors during RRT. (2C)  5.4.1: We suggest initiating RRT in patients with SUSAN via an uncuffed nontunneled dialysis catheter, rather than a tunneled catheter.(2D)  5.4.2: When choosing a vein for insertion of a dialysis catheter in patients with SUSAN, consider these preferences (Not Graded):  ? First choice: right jugular vein;  ? Second choice: femoral vein;  ? Third choice: left jugular vein;  ? Last choice: subclavian vein with preference for the dominant side.  5.4.3: We recommend using ultrasound guidance for dialysis catheter insertion. (1A)  5.4.4: We recommend obtaining a chest radiograph promptly after placement and before first use of an internal jugular or subclavian  dialysis catheter. (1B)  5.4.5: We suggest not using topical antibiotics over the skin insertion site of a nontunneled dialysis catheter in ICU patients with SUSAN  requiring RRT. (2C)  5.4.6: We suggest not using antibiotic locks for prevention of catheter-related infections of nontunneled dialysis catheters in SUSAN  requiring RRT. (2C)  5.5.1: We suggest to use dialyzers with a biocompatible membrane for IHD and CRRT in patients with SUSAN. (2C)  5.6.1: Use continuous and intermittent RRT as complementary therapies in SUSAN patients. (Not Graded)  5.6.2: We suggest using CRRT, rather than standard intermittent RRT, for hemodynamically unstable patients. (2B)  5.6.3: We suggest using CRRT, rather than intermittent RRT, for SUSAN patients with acute brain injury or other causes of increased  intracranial pressure or generalized brain edema. (2B)  5.7.1: We suggest using bicarbonate, rather than lactate, as a buffer in dialysate and replacement fluid for RRT in patients with SUSAN. (2C)  5.7.2: We recommend using bicarbonate, rather than lactate, as a buffer in dialysate and replacement fluid for RRT in patients with  SUSAN and circulatory shock. (1B)  5.7.3: We suggest using bicarbonate, rather than lactate, as a buffer in dialysate and replacement fluid for RRT in patients with SUSAN  and liver failure and/or lactic acidemia. (2B)  5.7.4: We recommend that dialysis fluids and replacement fluids in patients with SUSAN, at a minimum, comply with American  Association of Medical Instrumentation (AAMI) standards regarding contamination with bacteria and endotoxins. (1B)  5.8.1: The dose of RRT to be delivered should be prescribed before starting each session of RRT. (Not Graded) We recommend  frequent assessment of the actual delivered dose in order to adjust the prescription. (1B)  5.8.2: Provide RRT to achieve the goals of electrolyte, acid-base, solute, and fluid balance that will meet the patient’s needs. (Not  Graded)  5.8.3: We recommend delivering a Kt/V of 3.9 per week when using intermittent or extended RRT in SUSAN. (1A)  5.8.4: We recommend delivering an effluent volume of 20-25 mL/kg/h for CRRT in SUSAN (1A). This will usually require a higher  prescription of effluent volume. (Not Graded)  Abbreviations: SUSAN, acute kidney injury; BUN, blood urea nitrogen; CRRT, continuous renal replacement therapy; ICU, intensive  care unit; RRT, renal replacement therapy.  Reproduced with permission of KDIGO from the KDIGO Clinical Practice Guideline for Acute Kidney Injury.    ICU Vital Signs Last 24 Hrs  T(C): 36 (2020 20:16), Max: 37.4 (2020 00:00)  T(F): 96.8 (2020 20:16), Max: 99.3 (2020 00:00)  HR: 78 (2020 18:00) (76 - 132)  BP: 153/73 (2020 18:00) (135/75 - 153/73)  ABP: 160/75 (2020 18:00) (116/71 - 160/75)  ABP(mean): 88 (2020 07:32) (85 - 90)  RR: 20 (2020 18:00) (20 - 24)  SpO2: 99% (2020 18:00) (93% - 99%)    134<L>  |  92<L>  |  56.0<H>  ----------------------------<  386<H>  Ca:7.6<L> (2020 10:51)  3.8     |  19.0<L>  |  4.61<H>      eGFR if Non : 9 <L>  eGFR if : 10 <L>    TPro  5.5<L>  /  Alb  2.6<L>  /  TBili  <0.2<L>  /  DBili  0.1    /  AST  578<H>  /  ALT  1297<H>  /  AlkPhos  97     2020 10:51                        11.9   12.44<H> )-----------( 90<L>    ( 2020 02:49 )             38.4     Phos:4.0 mg/dL M.9 mg/dL     Urinalysis Basic - ( 2020 06:36 )  Color: Yellow / Appearance: Slightly Turbid<!> / S.020 / pH: x  Gluc: x / Ketone: Trace<!>  / Bili: Negative / Urobili: 1 mg/dL<!>   Blood: x / Protein: 100 mg/dL<!> / Nitrite: Negative   Leuk Esterase: Trace<!> / RBC: 11-25 /HPF<!> / WBC 3-5   Sq Epi: x / Non Sq Epi: Occasional / Bacteria: Moderate<!>    D/W The RN,

## 2020-04-01 NOTE — PROGRESS NOTE ADULT - SUBJECTIVE AND OBJECTIVE BOX
PULM/CCM PROGRESS NOTE      Patient is a 74y old  Female who presents with a chief complaint of Resp failure and seizure (2020 01:51)      BRIEF HOSPITAL COURSE: 74F current smoker w/ PMHx mild COPD, lung cancer s/p wedge resection, hypothyroid was BIBEMS with altered mental status. She was found by her family unresponsive and w/ agonal respirations around 6PM on . She was arousable but hypotensive on initial eval and was found to have multiple fentanyl patches on her which were removed by the ED nursing staff. Narcan was given which she initially responded to but soon after had a non sustained GTCS for ~ 30sec which responded to 2 mg Ativan. Intubated for airway protection. LDH, CRP and ferritin were elevated and pt is currently being r/o COVID infection. CXR w/ L retrocardiac opacity. RVP negative    Reported by family to be intentional opiate overdose. The patient wrote a note explaining her intentions prior.     Admitted with acute hypoxemic respiratory failure, secondary to severe sepsis viral COVID 19 pneumonia, septic shock, complicated by anuric ATN, acute liver failure likely ischemic hepatitis, now with severe combined respiratory and metabolic acidosis      COVID-19 test pending    PAST MEDICAL & SURGICAL HISTORY:  Lung nodule  COPD (chronic obstructive pulmonary disease)  Hyperlipidemia  Hypothyroid  Chronic knee pain  S/P hysterectomy  S/P  section        Medications:  azithromycin  IVPB 250 milliGRAM(s) IV Intermittent every 24 hours  azithromycin  IVPB      cefTRIAXone   IVPB 1000 milliGRAM(s) IV Intermittent every 24 hours  hydroxychloroquine   Oral   hydroxychloroquine 200 milliGRAM(s) Oral every 12 hours    midodrine 10 milliGRAM(s) Oral every 8 hours  norepinephrine Infusion 0.05 MICROgram(s)/kG/Min IV Continuous <Continuous>    ALBUTerol    90 MICROgram(s) HFA Inhaler 2 Puff(s) Inhalation every 6 hours  ALBUTerol    90 MICROgram(s) HFA Inhaler 1 Puff(s) Inhalation every 4 hours PRN  ipratropium 17 MICROgram(s) HFA Inhaler 1 Puff(s) Inhalation every 6 hours    dexMEDEtomidine Infusion 0.2 MICROgram(s)/kG/Hr IV Continuous <Continuous>  fentaNYL    Injectable 50 MICROGram(s) IV Push every 2 hours PRN  levETIRAcetam  IVPB 500 milliGRAM(s) IV Intermittent every 12 hours  LORazepam   Injectable 2 milliGRAM(s) IV Push every 6 hours PRN      enoxaparin Injectable 30 milliGRAM(s) SubCutaneous daily    pantoprazole  Injectable 40 milliGRAM(s) IV Push daily      insulin regular Infusion 12 Unit(s)/Hr IV Continuous <Continuous>  levothyroxine Injectable 50 MICROGram(s) IV Push at bedtime    sodium bicarbonate  Infusion 0.184 mEq/kG/Hr IV Continuous <Continuous>      chlorhexidine 0.12% Liquid 15 milliLiter(s) Oral Mucosa every 12 hours    acetylcysteine IVPB 12 Gram(s) IV Intermittent once  acetylcysteine IVPB 12 Gram(s) IV Intermittent once      Mode: AC/ CMV (Assist Control/ Continuous Mandatory Ventilation)  RR (machine): 24  TV (machine): 400  FiO2: 40  PEEP: 16  ITime: 1  MAP: 21  PIP: 31      ICU Vital Signs Last 24 Hrs  T(C): 37.1 (2020 07:32), Max: 37.5 (31 Mar 2020 20:00)  T(F): 98.7 (2020 07:32), Max: 99.5 (31 Mar 2020 20:00)  HR: 88 (2020 09:44) (77 - 132)  BP: 108/54 (31 Mar 2020 16:00) (87/62 - 108/54)  BP(mean): --  ABP: 132/67 (2020 09:44) (104/51 - 135/74)  ABP(mean): 88 (2020 07:32) (67 - 90)  RR: 22 (2020 09:44) (20 - 22)  SpO2: 97% (2020 09:44) (92% - 99%)      ABG - ( 2020 03:00 )  pH, Arterial: 7.19  pH, Blood: x     /  pCO2: 50    /  pO2: 167   / HCO3: 17    / Base Excess: -9.0  /  SaO2: 100            I&O's Detail    31 Mar 2020 07:01  -  2020 07:00  --------------------------------------------------------  IN:    fentaNYL Infusion.: 156 mL    Free Water: 200 mL    Jevity: 800 mL    multiple electrolytes Injection Type 1 Bolus: 1000 mL    norepinephrine Infusion: 582.6 mL    Solution: 200 mL  Total IN: 2938.6 mL    OUT:    Indwelling Catheter - Urethral: 795 mL  Total OUT: 795 mL    Total NET: 2143.6 mL      2020 07:01  -  2020 10:56  --------------------------------------------------------  IN:  Total IN: 0 mL    OUT:    Indwelling Catheter - Urethral: 35 mL  Total OUT: 35 mL    Total NET: -35 mL            LABS:                        11.9   12.44 )-----------( 90       ( 2020 02:49 )             38.4     04-01    130<L>  |  93<L>  |  49.0<H>  ----------------------------<  453<H>  4.4   |  16.0<L>  |  4.12<H>    Ca    7.1<L>      2020 02:49  Phos  6.7     04-01  Mg     1.8     04-01    TPro  5.1<L>  /  Alb  2.5<L>  /  TBili  0.2<L>  /  DBili  x   /  AST  889<H>  /  ALT  1364<H>  /  AlkPhos  104  04-01          CAPILLARY BLOOD GLUCOSE      POCT Blood Glucose.: 408 mg/dL (2020 10:21)    PT/INR - ( 2020 04:49 )   PT: 15.9 sec;   INR: 1.39 ratio         PTT - ( 30 Mar 2020 18:51 )  PTT:32.1 sec       CULTURES:      Physical Examination:    General: on vent     HEENT:  Symmetric.    PULM: Clear to auscultation bilaterally, no wheeze    NECK: ett    CVS: Regular rate and rhythm, no murmurs, rubs, or gallops    ABD: Soft, nondistended, nontender     EXT: No edema, nontender    SKIN: Warm and well perfused, no rashes noted.    NEURO: sedated    DEVICES:     RADIOLOGY:   < from: Xray Chest 1 View-PORTABLE IMMEDIATE (20 @ 13:01) >     EXAM:  XR CHEST PORTABLE IMMED 1V                          PROCEDURE DATE:  2020          INTERPRETATION:  TECHNIQUE: Single portable view of the chest.    COMPARISON: 3/30/2020    CLINICAL HISTORY: Line PlacementCOVID Resp Failure    FINDINGS:    Single frontal view of the chest demonstrates possible tiny left lower lobe effusion versus atelectasis. New right-sided central venous catheter tip in the proximal SVC. Tubes are unchanged. The cardiomediastinal silhouette is normal. No acute osseous abnormalities. Overlying EKG leads and wires are noted    IMPRESSION:    New right-sided central venous catheter. No pneumothorax.                TONA VARELA M.D., ATTENDING RADIOLOGIST  This document has been electronically signed. Mar 31 [id number]    < end of copied text >      CRITICAL CARE TIME SPENT: ***

## 2020-04-01 NOTE — DIETITIAN INITIAL EVALUATION ADULT. - ENTERAL
Consider changing bolus feeds to Glucerna 1.5- provide 165ml q 4 hrs daily (1000ml, 1500kcal, 82g protein) as tolerated

## 2020-04-01 NOTE — PROCEDURE NOTE - NSPOSTPRCRAD_GEN_A_CORE
central line located in the/post-procedure radiography performed/central line located in the superior vena cava
post-procedure radiography performed/CAtheter located in the Right Femoral Vein

## 2020-04-01 NOTE — PROGRESS NOTE ADULT - ASSESSMENT
-VDRF; see below  -R/O COVID; PUI  -Mild COPD  -Renal failure  -Transaminitis  -MOSF  -Hypercarbic resp failure  -Hyperkalemia  -S/P seizure episode  -AMS; ?med effect +/- sepsis  -Hx lung cancer  -Suicide attempt  -S/P hypotension    RECC:  Continue vent; will try to increase minute vent. For now, hydrocholoroquine. Abx. Lovenox. Steroids. AEDs. Follow [facility] inc K+. Albuterol MDI q6h. REnal consult called. Check COVID testing.     Long d/w both her HCPs; her daughter Mayra and patient's son. Reviewed current status. Reviewed poor prognosis as of now. They both report that their mom would not have wanted any heroic resuscitation measures beyond what is done now; no CPR, D/C cardioversion. Want pt to be DNR. Further GOC conversation as clinical situation changes.     DNR ordered. They are faxing living will. -VDRF; see below  -R/O COVID; PUI  -Mild COPD  -Renal failure  -Transaminitis  -MOSF  -Hypercarbic resp failure  -Hyperkalemia  -S/P seizure episode  -AMS; ?med effect +/- sepsis  -Hx lung cancer  -Suicide attempt  -S/P hypotension  -Hyperglycemia    RECC:  Continue vent; will try to increase minute vent. For now, hydrocholoroquine. Abx. Lovenox. Steroids. AEDs. Insulin gtt. Follow [employer] [facility] K+. Albuterol MDI q6h. REnal consult called. Check COVID testing.     Long d/w both her HCPs; her daughter Mayra and patient's son. Reviewed current status. Reviewed poor prognosis as of now. They both report that their mom would not have wanted any heroic resuscitation measures beyond what is done now; no CPR, D/C cardioversion. Want pt to be DNR. Further GOC conversation as clinical situation changes.     DNR ordered. They are faxing living will.

## 2020-04-01 NOTE — PROGRESS NOTE ADULT - SUBJECTIVE AND OBJECTIVE BOX
HPI: 74F current smoker w/ PMHx COPD, lung nodule s/p wedge resection, hypothyroid was BIBEMS with altered mental status. She was found by her family unresponsive and w/ agonal respirations around 6PM on 03/20. She was arousable but hypotensive on initial eval and was found to have multiple fentanyl patches on her which were removed by the ED nursing staff. Narcan was given which she initially responded to but soon after had a non sustained GTCS for ~ 30sec which responded to 2 mg Ativan. Intubated for airway protection. LDH, CRP and ferritin were elevated and pt is currently being r/o COVID infection. CXR w/ L retrocardiac opacity. RVP negative    Reported by family to be intentional opiate overdose.    Admitted with acute hypoxemic respiratory failure, secondary to severe sepsis viral COVID 19 pneumonia, septic shock, complicated by anuric ATN, acute liver failure likely ischemic hepatitis, now with severe combined respiratory and metabolic acidosis      Hosp day #2  Vent day #2  p:F ratio = 240    Acute hypoxemia overnight, titrated to 100% FiO2 PEEP +18  Sedated on Fentanyl  Tachyarrhythmias, episodes of SVT, given IV fluid challenges  Repeat ABG 7.17/54/168/17  Vent augmented, RR increased to 24 to compensate, weaned to 60% / +16  Distributive shock on Norepinephrine        Vital signs reviewed and physical exam performed where pertinent and urgently required.    Lab/radiology studies/ABG/meds reviewed and interpreted into the assessment and treatment plan.        Assessment/Plan/Therapeutic interventions:    Neuro: Sedated on Fentanyl gtt, in the setting of acute liver failure, will change off opiates to Precedex gtt as tolerated. UTox not sent due to anuria.    CV: Vasopressor support titrating to maintain MAP >65. Monitoring end points of perfusion. Tachyarrhythmias, self-limiting, responded to IVF boluses, monitoring lytes and supplementing to optimize arrhythmia suppression.    Pulm: Low Tv lung protective strategy 4-8 cc/kg IBW, with high PEEP, low FiO2 strategy per ARDS[employer] guidelines. Manipulating vent to maintain paO2 55-80 and pH >7.15 with permissive hypercapnea.    GI: PPI for GI ppx. Acute liver failure, likely ischemic hepatitis, starting NAC empirically. Check coags; lactate elevated will fluid challenge though may be decreased clearance due to liver failure. Check acetaminophen and salicylate levels. Avoiding hepatotoxins.    Renal: Worsening ATN, Cr 2.44 --> 3.70, anuric overnight. Giving 1L IV fluid challenge with balanced crystalloid. May need CRRT if acidosis does not improve.    Heme: Pharmacologic DVT ppx in addition to SCDs, renally adjusted.    ID: Hydroxychloroquine, Azithromycin, and Ceftriaxone empirically. Cultures and COVID testing pending.    Endo: Aggressive glycemic control to keep FS glucose to <180mg/dl while critically ill.         COVID 19 specific considerations and therapeutic options based on the available and rapidly changing literature.    52 minutes of critical care time spent in the management of this critically ill COVID-19 patient/PUI patient suffering from multisystem organ failure with continuous assessments and interventions based on the interpretation of multiple databases. Critical care time not inclusive of independent time spent on procedures performed. HPI: 74F current smoker w/ PMHx COPD, lung nodule s/p wedge resection, hypothyroid was BIBEMS with altered mental status. She was found by her family unresponsive and w/ agonal respirations around 6PM on 03/20. She was arousable but hypotensive on initial eval and was found to have multiple fentanyl patches on her which were removed by the ED nursing staff. Narcan was given which she initially responded to but soon after had a non sustained GTCS for ~ 30sec which responded to 2 mg Ativan. Intubated for airway protection. LDH, CRP and ferritin were elevated and pt is currently being r/o COVID infection. CXR w/ L retrocardiac opacity. RVP negative    Reported by family to be intentional opiate overdose.    Admitted with acute hypoxemic respiratory failure, secondary to severe sepsis viral COVID 19 pneumonia, septic shock, complicated by anuric ATN, acute liver failure likely ischemic hepatitis, now with severe combined respiratory and metabolic acidosis      Hosp day #2  Vent day #2  p:F ratio = 240    Acute hypoxemia overnight, titrated to 100% FiO2 PEEP +18  Sedated on Fentanyl  Tachyarrhythmias, episodes of SVT, given IV fluid challenges  Repeat ABG 7.17/54/168/17  Vent augmented, RR increased to 24 to compensate, weaned to 60% / +16  Distributive shock on Norepinephrine    05:00 Addendum: hyperglycemic this AM with worsening AGMA, insulin gtt started for BG >400  FiO2 weaned 60 --> 40%      Vital signs reviewed and physical exam performed where pertinent and urgently required.    Lab/radiology studies/ABG/meds reviewed and interpreted into the assessment and treatment plan.        Assessment/Plan/Therapeutic interventions:    Neuro: Sedated on Fentanyl gtt, in the setting of acute liver failure, will change off opiates to Precedex gtt as tolerated. UTox not sent due to anuria.    CV: Vasopressor support titrating to maintain MAP >65. Monitoring end points of perfusion. Tachyarrhythmias, self-limiting, responded to IVF boluses, monitoring lytes and supplementing to optimize arrhythmia suppression.    Pulm: Low Tv lung protective strategy 4-8 cc/kg IBW, with high PEEP, low FiO2 strategy per ARDS[employer] guidelines. Manipulating vent to maintain paO2 55-80 and pH >7.15 with permissive hypercapnea.    GI: PPI for GI ppx. Acute liver failure, likely ischemic hepatitis, starting NAC empirically. Check coags; lactate elevated will fluid challenge though may be decreased clearance due to liver failure. Check acetaminophen and salicylate levels. Avoiding hepatotoxins.    Renal: Worsening ATN, Cr 2.44 --> 3.70, anuric overnight. Giving 1L IV fluid challenge with balanced crystalloid. May need CRRT if acidosis does not improve.    Heme: Pharmacologic DVT ppx in addition to SCDs, renally adjusted.    ID: Hydroxychloroquine, Azithromycin, and Ceftriaxone empirically. Cultures and COVID testing pending.    Endo: Aggressive glycemic control to keep FS glucose to <180mg/dl while critically ill.         COVID 19 specific considerations and therapeutic options based on the available and rapidly changing literature.    52 minutes of critical care time spent in the management of this critically ill COVID-19 patient/PUI patient suffering from multisystem organ failure with continuous assessments and interventions based on the interpretation of multiple databases. Critical care time not inclusive of independent time spent on procedures performed. HPI: 74F current smoker w/ PMHx COPD, lung nodule s/p wedge resection, hypothyroid was BIBEMS with altered mental status. She was found by her family unresponsive and w/ agonal respirations around 6PM on 03/20. She was arousable but hypotensive on initial eval and was found to have multiple fentanyl patches on her which were removed by the ED nursing staff. Narcan was given which she initially responded to but soon after had a non sustained GTCS for ~ 30sec which responded to 2 mg Ativan. Intubated for airway protection. LDH, CRP and ferritin were elevated and pt is currently being r/o COVID infection. CXR w/ L retrocardiac opacity. RVP negative    Reported by family to be intentional opiate overdose.    Admitted with acute hypoxemic respiratory failure, secondary to severe sepsis viral COVID 19 pneumonia, septic shock, complicated by anuric ATN, acute liver failure likely ischemic hepatitis, now with severe combined respiratory and metabolic acidosis      Hosp day #2  Vent day #2  p:F ratio = 240    Acute hypoxemia overnight, titrated to 100% FiO2 PEEP +18  Sedated on Fentanyl  Tachyarrhythmias, episodes of SVT, given IV fluid challenges  Repeat ABG 7.17/54/168/17  Vent augmented, RR increased to 24 to compensate, weaned to 60% / +16  Distributive shock on Norepinephrine    05:00 Addendum: hyperglycemic this AM with worsening AGMA, insulin gtt started for BG >400  FiO2 weaned 60 --> 40%  Rising lactate  Remains anuric with worsening ATN, sodium bicarb gtt added. Likely will need CRRT this AM. Will c/s Nephrology.    Vital signs reviewed and physical exam performed where pertinent and urgently required.    Lab/radiology studies/ABG/meds reviewed and interpreted into the assessment and treatment plan.        Assessment/Plan/Therapeutic interventions:    Neuro: Sedated on Fentanyl gtt, in the setting of acute liver failure, will change off opiates to Precedex gtt as tolerated. UTox not sent due to anuria.    CV: Vasopressor support titrating to maintain MAP >65. Monitoring end points of perfusion. Tachyarrhythmias, self-limiting, responded to IVF boluses, monitoring lytes and supplementing to optimize arrhythmia suppression.    Pulm: Low Tv lung protective strategy 4-8 cc/kg IBW, with high PEEP, low FiO2 strategy per ARDS[employer] guidelines. Manipulating vent to maintain paO2 55-80 and pH >7.15 with permissive hypercapnea.    GI: PPI for GI ppx. Acute liver failure, likely ischemic hepatitis, starting NAC empirically. Check coags; lactate elevated will fluid challenge though may be decreased clearance due to liver failure. Check acetaminophen and salicylate levels. Avoiding hepatotoxins.    Renal: Worsening ATN, Cr 2.44 --> 3.70, anuric overnight. Giving 1L IV fluid challenge with balanced crystalloid. May need CRRT if acidosis does not improve.    Heme: Pharmacologic DVT ppx in addition to SCDs, renally adjusted.    ID: Hydroxychloroquine, Azithromycin, and Ceftriaxone empirically. Cultures and COVID testing pending.    Endo: Aggressive glycemic control to keep FS glucose to <180mg/dl while critically ill.         COVID 19 specific considerations and therapeutic options based on the available and rapidly changing literature.    52 minutes of critical care time spent in the management of this critically ill COVID-19 patient/PUI patient suffering from multisystem organ failure with continuous assessments and interventions based on the interpretation of multiple databases. Critical care time not inclusive of independent time spent on procedures performed.

## 2020-04-02 LAB
ALBUMIN SERPL ELPH-MCNC: 2.4 G/DL — LOW (ref 3.3–5.2)
ALP SERPL-CCNC: 90 U/L — SIGNIFICANT CHANGE UP (ref 40–120)
ALT FLD-CCNC: 942 U/L — HIGH
ANION GAP SERPL CALC-SCNC: 19 MMOL/L — HIGH (ref 5–17)
AST SERPL-CCNC: 271 U/L — HIGH
BILIRUB SERPL-MCNC: <0.2 MG/DL — LOW (ref 0.4–2)
BUN SERPL-MCNC: 50 MG/DL — HIGH (ref 8–20)
CALCIUM SERPL-MCNC: 7.9 MG/DL — LOW (ref 8.6–10.2)
CHLORIDE SERPL-SCNC: 94 MMOL/L — LOW (ref 98–107)
CO2 SERPL-SCNC: 23 MMOL/L — SIGNIFICANT CHANGE UP (ref 22–29)
CREAT SERPL-MCNC: 3.88 MG/DL — HIGH (ref 0.5–1.3)
GLUCOSE BLDC GLUCOMTR-MCNC: 104 MG/DL — HIGH (ref 70–99)
GLUCOSE BLDC GLUCOMTR-MCNC: 112 MG/DL — HIGH (ref 70–99)
GLUCOSE BLDC GLUCOMTR-MCNC: 113 MG/DL — HIGH (ref 70–99)
GLUCOSE BLDC GLUCOMTR-MCNC: 117 MG/DL — HIGH (ref 70–99)
GLUCOSE BLDC GLUCOMTR-MCNC: 127 MG/DL — HIGH (ref 70–99)
GLUCOSE BLDC GLUCOMTR-MCNC: 137 MG/DL — HIGH (ref 70–99)
GLUCOSE BLDC GLUCOMTR-MCNC: 87 MG/DL — SIGNIFICANT CHANGE UP (ref 70–99)
GLUCOSE BLDC GLUCOMTR-MCNC: 94 MG/DL — SIGNIFICANT CHANGE UP (ref 70–99)
GLUCOSE BLDC GLUCOMTR-MCNC: 95 MG/DL — SIGNIFICANT CHANGE UP (ref 70–99)
GLUCOSE SERPL-MCNC: 122 MG/DL — HIGH (ref 70–99)
LACTATE BLDV-MCNC: 0.9 MMOL/L — SIGNIFICANT CHANGE UP (ref 0.5–2)
LACTATE SERPL-SCNC: 1 MMOL/L — SIGNIFICANT CHANGE UP (ref 0.5–2)
MAGNESIUM SERPL-MCNC: 2.1 MG/DL — SIGNIFICANT CHANGE UP (ref 1.6–2.6)
PHOSPHATE SERPL-MCNC: 4.4 MG/DL — SIGNIFICANT CHANGE UP (ref 2.4–4.7)
POTASSIUM SERPL-MCNC: 3.9 MMOL/L — SIGNIFICANT CHANGE UP (ref 3.5–5.3)
POTASSIUM SERPL-SCNC: 3.9 MMOL/L — SIGNIFICANT CHANGE UP (ref 3.5–5.3)
PROT SERPL-MCNC: 5.1 G/DL — LOW (ref 6.6–8.7)
SARS-COV-2 RNA SPEC QL NAA+PROBE: SIGNIFICANT CHANGE UP
SODIUM SERPL-SCNC: 136 MMOL/L — SIGNIFICANT CHANGE UP (ref 135–145)

## 2020-04-02 PROCEDURE — 99291 CRITICAL CARE FIRST HOUR: CPT

## 2020-04-02 PROCEDURE — 90937 HEMODIALYSIS REPEATED EVAL: CPT

## 2020-04-02 RX ORDER — DILTIAZEM HCL 120 MG
10 CAPSULE, EXT RELEASE 24 HR ORAL ONCE
Refills: 0 | Status: COMPLETED | OUTPATIENT
Start: 2020-04-02 | End: 2020-04-02

## 2020-04-02 RX ORDER — INSULIN LISPRO 100/ML
VIAL (ML) SUBCUTANEOUS
Refills: 0 | Status: DISCONTINUED | OUTPATIENT
Start: 2020-04-02 | End: 2020-04-03

## 2020-04-02 RX ORDER — DEXTROSE 50 % IN WATER 50 %
12.5 SYRINGE (ML) INTRAVENOUS ONCE
Refills: 0 | Status: DISCONTINUED | OUTPATIENT
Start: 2020-04-02 | End: 2020-04-07

## 2020-04-02 RX ORDER — DEXTROSE 50 % IN WATER 50 %
25 SYRINGE (ML) INTRAVENOUS ONCE
Refills: 0 | Status: DISCONTINUED | OUTPATIENT
Start: 2020-04-02 | End: 2020-04-07

## 2020-04-02 RX ORDER — GLUCAGON INJECTION, SOLUTION 0.5 MG/.1ML
1 INJECTION, SOLUTION SUBCUTANEOUS ONCE
Refills: 0 | Status: DISCONTINUED | OUTPATIENT
Start: 2020-04-02 | End: 2020-04-07

## 2020-04-02 RX ORDER — MAGNESIUM SULFATE 500 MG/ML
2 VIAL (ML) INJECTION ONCE
Refills: 0 | Status: COMPLETED | OUTPATIENT
Start: 2020-04-02 | End: 2020-04-02

## 2020-04-02 RX ORDER — SODIUM CHLORIDE 9 MG/ML
1000 INJECTION, SOLUTION INTRAVENOUS ONCE
Refills: 0 | Status: COMPLETED | OUTPATIENT
Start: 2020-04-02 | End: 2020-04-02

## 2020-04-02 RX ORDER — NOREPINEPHRINE BITARTRATE/D5W 8 MG/250ML
0.05 PLASTIC BAG, INJECTION (ML) INTRAVENOUS
Qty: 8 | Refills: 0 | Status: DISCONTINUED | OUTPATIENT
Start: 2020-04-02 | End: 2020-04-02

## 2020-04-02 RX ORDER — SODIUM CHLORIDE 9 MG/ML
1000 INJECTION, SOLUTION INTRAVENOUS
Refills: 0 | Status: DISCONTINUED | OUTPATIENT
Start: 2020-04-02 | End: 2020-04-07

## 2020-04-02 RX ORDER — HEPARIN SODIUM 5000 [USP'U]/ML
5000 INJECTION INTRAVENOUS; SUBCUTANEOUS EVERY 8 HOURS
Refills: 0 | Status: DISCONTINUED | OUTPATIENT
Start: 2020-04-02 | End: 2020-04-07

## 2020-04-02 RX ORDER — QUETIAPINE FUMARATE 200 MG/1
25 TABLET, FILM COATED ORAL DAILY
Refills: 0 | Status: DISCONTINUED | OUTPATIENT
Start: 2020-04-02 | End: 2020-04-07

## 2020-04-02 RX ORDER — DEXTROSE 50 % IN WATER 50 %
15 SYRINGE (ML) INTRAVENOUS ONCE
Refills: 0 | Status: DISCONTINUED | OUTPATIENT
Start: 2020-04-02 | End: 2020-04-07

## 2020-04-02 RX ADMIN — LEVETIRACETAM 420 MILLIGRAM(S): 250 TABLET, FILM COATED ORAL at 05:12

## 2020-04-02 RX ADMIN — Medication 50 GRAM(S): at 10:58

## 2020-04-02 RX ADMIN — HEPARIN SODIUM 1.2 UNIT(S)/HR: 5000 INJECTION INTRAVENOUS; SUBCUTANEOUS at 06:08

## 2020-04-02 RX ADMIN — PANTOPRAZOLE SODIUM 40 MILLIGRAM(S): 20 TABLET, DELAYED RELEASE ORAL at 10:14

## 2020-04-02 RX ADMIN — HEPARIN SODIUM 5000 UNIT(S): 5000 INJECTION INTRAVENOUS; SUBCUTANEOUS at 16:00

## 2020-04-02 RX ADMIN — FENTANYL CITRATE 50 MICROGRAM(S): 50 INJECTION INTRAVENOUS at 04:13

## 2020-04-02 RX ADMIN — MIDODRINE HYDROCHLORIDE 10 MILLIGRAM(S): 2.5 TABLET ORAL at 17:00

## 2020-04-02 RX ADMIN — Medication 44.17 GRAM(S): at 12:00

## 2020-04-02 RX ADMIN — CEFTRIAXONE 100 MILLIGRAM(S): 500 INJECTION, POWDER, FOR SOLUTION INTRAMUSCULAR; INTRAVENOUS at 06:07

## 2020-04-02 RX ADMIN — Medication 10 MILLIGRAM(S): at 11:24

## 2020-04-02 RX ADMIN — DEXMEDETOMIDINE HYDROCHLORIDE IN 0.9% SODIUM CHLORIDE 4.08 MICROGRAM(S)/KG/HR: 4 INJECTION INTRAVENOUS at 06:08

## 2020-04-02 RX ADMIN — INSULIN HUMAN 12 UNIT(S)/HR: 100 INJECTION, SOLUTION SUBCUTANEOUS at 02:14

## 2020-04-02 RX ADMIN — SODIUM CHLORIDE 2000 MILLILITER(S): 9 INJECTION, SOLUTION INTRAVENOUS at 14:00

## 2020-04-02 RX ADMIN — Medication 2 MILLIGRAM(S): at 15:00

## 2020-04-02 RX ADMIN — HEPARIN SODIUM 5000 UNIT(S): 5000 INJECTION INTRAVENOUS; SUBCUTANEOUS at 21:08

## 2020-04-02 RX ADMIN — AZITHROMYCIN 250 MILLIGRAM(S): 500 TABLET, FILM COATED ORAL at 03:23

## 2020-04-02 RX ADMIN — LEVETIRACETAM 420 MILLIGRAM(S): 250 TABLET, FILM COATED ORAL at 20:18

## 2020-04-02 RX ADMIN — Medication 44.17 GRAM(S): at 11:24

## 2020-04-02 NOTE — PROGRESS NOTE ADULT - ASSESSMENT
SUSAN- likely ATN  R/O COVID-19  Respiratory failure    Patient is DNR    Continue CRRT    Monitor serum creatinine, Is and Os SUSAN- likely ATN  R/O COVID-19  Respiratory failure    Patient is DNR    Continue CRRT    Monitor serum creatinine, Is and Os    I was Physically Present for the key portions of the Evaluation & management ( E/M ) Service provided.    I agree with the above History  , Physical examination & Treatment Plans,    I have Reviewed , Modified or appended where appropriate,

## 2020-04-02 NOTE — PROGRESS NOTE ADULT - ASSESSMENT
-S/P VDRF; see below  -COVID negative  -Mild COPD  -Renal failure; s/p CVVHD  -Transaminitis  -MOSF  -Hypercarbic resp failure  -Hyperkalemia  -S/P seizure episode  -AMS  -Hx lung cancer  -Suicide attempt  -S/P hypotension  -Hyperglycemia    RECC:  Follow on supplemental O2. D/C hydrocholoroquine. Continue abx. Lovenox.  EDs. Insulin. Renal f/u; ?HD. Lytes per renal. Albuterol MDI q6h.  Psych. Tx to non-COVID ICU.    Pt DNR.     D/W both her HCPs; her daughter Mayra and patient's son. Reviewed current status.  They would like her to be DNI as well given her mother's previous wishes. They will fill out and fax MOLST as well.     Pt still critically ill and unstable.

## 2020-04-02 NOTE — PROGRESS NOTE ADULT - SUBJECTIVE AND OBJECTIVE BOX
[facility] DIVISION OF KIDNEY DISEASES AND HYPERTENSION -- FOLLOW UP NOTE  --------------------------------------------------------------------------------  Chief Complaint: SUSAN on CRRT    24 hour events/subjective:  Hypothermic      PAST HISTORY  --------------------------------------------------------------------------------  No significant changes to PMH, PSH, FHx, SHx, unless otherwise noted    ALLERGIES & MEDICATIONS  --------------------------------------------------------------------------------  Allergies  No Known Allergies    Standing Inpatient Medications  acetylcysteine IVPB 12 Gram(s) IV Intermittent once  acetylcysteine IVPB 12 Gram(s) IV Intermittent once  ALBUTerol    90 MICROgram(s) HFA Inhaler 2 Puff(s) Inhalation every 6 hours  azithromycin  IVPB 250 milliGRAM(s) IV Intermittent every 24 hours  azithromycin  IVPB      cefTRIAXone   IVPB 1000 milliGRAM(s) IV Intermittent every 24 hours  heparin  Injectable 5000 Unit(s) SubCutaneous every 8 hours  ipratropium 17 MICROgram(s) HFA Inhaler 1 Puff(s) Inhalation every 6 hours  levETIRAcetam  IVPB 500 milliGRAM(s) IV Intermittent every 12 hours  levothyroxine Injectable 50 MICROGram(s) IV Push at bedtime  midodrine 10 milliGRAM(s) Oral every 8 hours  pantoprazole  Injectable 40 milliGRAM(s) IV Push daily  QUEtiapine 25 milliGRAM(s) Oral daily    PRN Inpatient Medications  ALBUTerol    90 MICROgram(s) HFA Inhaler 1 Puff(s) Inhalation every 4 hours PRN  LORazepam   Injectable 2 milliGRAM(s) IV Push every 6 hours PRN      REVIEW OF SYSTEMS  --------------------------------------------------------------------------------  PRAVEENA    VITALS/PHYSICAL EXAM  --------------------------------------------------------------------------------  T(C): 33.7 (04-02-20 @ 08:27), Max: 37 (04-01-20 @ 11:00)  HR: 70 (04-02-20 @ 05:05) (55 - 91)  BP: 153/73 (04-01-20 @ 18:00) (135/75 - 153/73)  RR: 16 (04-02-20 @ 05:05) (11 - 24)  SpO2: 97% (04-02-20 @ 05:05) (97% - 100%)    Weight (kg): 80 (04-01-20 @ 21:09)    04-01-20 @ 07:01  -  04-02-20 @ 07:00  --------------------------------------------------------  IN: 2071.8 mL / OUT: 1532 mL / NET: 539.8 mL      Physical Exam:  Due to the nature of this patient's COVID-19 isolation status, no bedside physical exam was done to limit spread of infection. Objective data were reviewed in detail.    LABS/STUDIES  --------------------------------------------------------------------------------              11.9   12.44 >-----------<  90       [04-01-20 @ 02:49]              38.4     136  |  94  |  50.0  ----------------------------<  122      [04-02-20 @ 01:30]  3.9   |  23.0  |  3.88        Ca     7.9     [04-02-20 @ 01:30]      Mg     2.1     [04-02-20 @ 01:30]      Phos  4.4     [04-02-20 @ 01:30]    TPro  5.1  /  Alb  2.4  /  TBili  <0.2  /  DBili  x   /  AST  271  /  ALT  942  /  AlkPhos  90  [04-02-20 @ 01:30]    PT/INR: PT 15.9 , INR 1.39       [04-01-20 @ 04:49]    Creatinine Trend:  SCr 3.88 [04-02 @ 01:30]  SCr 4.61 [04-01 @ 10:51]  SCr 4.12 [04-01 @ 02:49]  SCr 3.70 [03-31 @ 19:29]  SCr 2.44 [03-31 @ 16:06]    Urinalysis - [04-01-20 @ 06:36]      Color Yellow / Appearance Slightly Turbid / SG 1.020 / pH 5.0      Gluc Negative / Ketone Trace  / Bili Negative / Urobili 1       Blood Large / Protein 100 / Leuk Est Trace / Nitrite Negative      RBC 11-25 / WBC 3-5 / Hyaline  / Gran  / Sq Epi  / Non Sq Epi Occasional / Bacteria Moderate    Ferritin 3576      [03-30-20 @ 18:51]  TSH 0.23      [04-01-20 @ 02:49]  Lipid: chol 106, , HDL 31, LDL 27      [04-01-20 @ 02:49]    HBsAb Nonreact      [04-01-20 @ 07:33]  HBsAg Nonreact      [04-01-20 @ 07:33]  HCV 0.18, Nonreact      [04-01-20 @ 07:33]  HIV Nonreact      [04-01-20 @ 02:49]

## 2020-04-02 NOTE — PROGRESS NOTE ADULT - SUBJECTIVE AND OBJECTIVE BOX
PULMONARY PROGRESS NOTE      JOSE M KEARNEYBRITTANI-[id number]    Patient is a 74y old  Female who presents with a chief complaint of Resp failure and seizure (2020 10:19)      INTERVAL HPI/OVERNIGHT EVENTS: 74y Female  found unresponsive. Brought to ER, intubated in shock. UNclear presentation and was initially treated as a PUI for COVID. COurse complicated with SUSAN and Shock Liver.  requiring vasopressors. Ultimately required initiation of CVVH. Ultimately found to have been an intentional narcotic OD, with multiple Fentanyl patches.  Started on NAC infusion for shock liver and unclear if acetaminophen was involved in her intentional OD.      Events last 24 hours: Pts BP liberalized, weaned of pressors. Awoke and followed commands. Tolerated SBT and was extubated . COVID was negative. CVVH removed in favor of traditional HD.   Currently awake and following commands with some mild ICU delirium.       MEDICATIONS  (STANDING):  acetylcysteine IVPB 12 Gram(s) IV Intermittent once  ALBUTerol    90 MICROgram(s) HFA Inhaler 2 Puff(s) Inhalation every 6 hours  azithromycin  IVPB 250 milliGRAM(s) IV Intermittent every 24 hours  azithromycin  IVPB      cefTRIAXone   IVPB 1000 milliGRAM(s) IV Intermittent every 24 hours  heparin  Injectable 5000 Unit(s) SubCutaneous every 8 hours  ipratropium 17 MICROgram(s) HFA Inhaler 1 Puff(s) Inhalation every 6 hours  levETIRAcetam  IVPB 500 milliGRAM(s) IV Intermittent every 12 hours  levothyroxine Injectable 50 MICROGram(s) IV Push at bedtime  midodrine 10 milliGRAM(s) Oral every 8 hours  pantoprazole  Injectable 40 milliGRAM(s) IV Push daily  QUEtiapine 25 milliGRAM(s) Oral daily      MEDICATIONS  (PRN):  ALBUTerol    90 MICROgram(s) HFA Inhaler 1 Puff(s) Inhalation every 4 hours PRN Bronchospasm  LORazepam   Injectable 2 milliGRAM(s) IV Push every 6 hours PRN Self-Injurious behavior      Allergies    No Known Allergies    Intolerances        PAST MEDICAL & SURGICAL HISTORY:  Lung nodule  COPD (chronic obstructive pulmonary disease)  Hyperlipidemia  Hypothyroid  Chronic knee pain  S/P hysterectomy  S/P  section      SOCIAL HISTORY  Smoking History: current smoker      REVIEW OF SYSTEMS:    not available    Vital Signs Last 24 Hrs  T(C): 33.7 (2020 08:27), Max: 36.2 (2020 14:47)  T(F): 92.7 (2020 08:27), Max: 97.2 (2020 14:47)  HR: 70 (2020 05:05) (55 - 91)  BP: 153/73 (2020 18:00) (135/75 - 153/73)  BP(mean): --  RR: 16 (2020 05:05) (11 - 24)  SpO2: 97% (2020 05:05) (97% - 100%)    PHYSICAL EXAMINATION:    GENERAL: The patient is awake and alert but not answering questions or following commands     HEENT: Head is normocephalic and atraumatic. Extraocular muscles are intact. Mucous membranes are moist.    NECK: Supple.    LUNGS: Clear to auscultation without wheezing, rales or rhonchi; respirations unlabored    HEART: Regular rate and rhythm     ABDOMEN: Soft, nontender, and nondistended.      EXTREMITIES: Without any cyanosis, clubbing, rash, lesions; + edema.    NEUROLOGIC: not following commands.    LABS:                        11.9   12.44 )-----------( 90       ( 2020 02:49 )             38.4     04-02    136  |  94<L>  |  50.0<H>  ----------------------------<  122<H>  3.9   |  23.0  |  3.88<H>    Ca    7.9<L>      2020 01:30  Phos  4.4     04-02  Mg     2.1     04-02    TPro  5.1<L>  /  Alb  2.4<L>  /  TBili  <0.2<L>  /  DBili  x   /  AST  271<H>  /  ALT  942<H>  /  AlkPhos  90  04-02    PT/INR - ( 2020 04:49 )   PT: 15.9 sec;   INR: 1.39 ratio             ABG - ( 2020 23:29 )  pH, Arterial: 7.31  pH, Blood: x     /  pCO2: 47    /  pO2: 100   / HCO3: 22    / Base Excess: -2.5  /  SaO2: 98                 Lactate, Blood: 1.0 mmol/L (20 @ 01:30)    Procalcitonin, Serum: 2.51 ng/mL (20 @ 18:51)      MICROBIOLOGY:  COVID-19 PCR: NotDetec       RADIOLOGY & ADDITIONAL STUDIES:  < from: Xray Chest 1 View-PORTABLE IMMEDIATE (20 @ 13:01) >     EXAM:  XR CHEST PORTABLE IMMED 1V                          PROCEDURE DATE:  2020          INTERPRETATION:  TECHNIQUE: Single portable view of the chest.    COMPARISON: 3/30/2020    CLINICAL HISTORY: Line PlacementCOVID Resp Failure    FINDINGS:    Single frontal view of the chest demonstrates possible tiny left lower lobe effusion versus atelectasis. New right-sided central venous catheter tip in the proximal SVC. Tubes are unchanged. The cardiomediastinal silhouette is normal. No acute osseous abnormalities. Overlying EKG leads and wires are noted    IMPRESSION:    New right-sided central venous catheter. No pneumothorax.                TONA VARELA M.D., ATTENDING RADIOLOGIST    < end of copied text >

## 2020-04-02 NOTE — PROGRESS NOTE ADULT - SUBJECTIVE AND OBJECTIVE BOX
Patient is a 74y old  Female who presents with a chief complaint of Resp failure and seizure (2020 09:57)      BRIEF HOSPITAL COURSE: 74y Female  found unresponsive. Brought to ER, intubated in shock. UNclear presentation and was initially treated as a PUI for COVID. COurse complicated with SUSAN and Shock Liver.  requiring vasopressors. Ultimately required initiation of CVVH. Ultimately found to have been an intentional narcotic OD, with multiple Fentanyl patches.  Started on NAC infusion for shock liver and unclear if acetaminophen was involved in her intentional OD.      Events last 24 hours: Pts BP liberalized, weaned of pressors. Awoke and followed commands. Tolerated SBT and was extubated . COVID was negative. CVVH removed in favor of traditional HD.   Currently awake and following commands with some mild ICU delirium.     PAST MEDICAL & SURGICAL HISTORY:  Lung nodule  COPD (chronic obstructive pulmonary disease)  Hyperlipidemia  Hypothyroid  Chronic knee pain  S/P hysterectomy  S/P  section        Hosp day #3d    Vent day Extubated         Vital signs / Reviewed and Physical Exam Performed where pertinent and urgently required  T(C): 33.7 (20 @ 08:27), Max: 37 (20 @ 11:00)  HR: 70 (20 @ 05:05) (55 - 91)  BP: 153/73 (20 @ 18:00) (135/75 - 153/73)  RR: 16 (20 @ 05:05) (11 - 24)  SpO2: 97% (20 @ 05:05) (97% - 100%)  20 @ 07:01  -  20 @ 07:00  --------------------------------------------------------  IN: 2071.8 mL / OUT: 1532 mL / NET: 539.8 mL      Lab / Radiology  studies / ABG / Meds -  reviewed and interpreted into the assessment and treatment plan.  acetylcysteine IVPB 12 Gram(s) IV Intermittent once  acetylcysteine IVPB 12 Gram(s) IV Intermittent once  ALBUTerol    90 MICROgram(s) HFA Inhaler 2 Puff(s) Inhalation every 6 hours  ALBUTerol    90 MICROgram(s) HFA Inhaler 1 Puff(s) Inhalation every 4 hours PRN  azithromycin  IVPB 250 milliGRAM(s) IV Intermittent every 24 hours  azithromycin  IVPB      cefTRIAXone   IVPB 1000 milliGRAM(s) IV Intermittent every 24 hours  heparin  Injectable 5000 Unit(s) SubCutaneous every 8 hours  ipratropium 17 MICROgram(s) HFA Inhaler 1 Puff(s) Inhalation every 6 hours  levETIRAcetam  IVPB 500 milliGRAM(s) IV Intermittent every 12 hours  levothyroxine Injectable 50 MICROGram(s) IV Push at bedtime  LORazepam   Injectable 2 milliGRAM(s) IV Push every 6 hours PRN  midodrine 10 milliGRAM(s) Oral every 8 hours  pantoprazole  Injectable 40 milliGRAM(s) IV Push daily  QUEtiapine 25 milliGRAM(s) Oral daily      Assessment/Plan/Therapeutic interventions  Problems:  Acute Hypoxic respiratory failure related to Intentional Narcotic OD   Acute renal failure   ICU Delirium     Neuro - Mild delirium remains   Try to void sedatives  Started on Seroquel Await Psych Evaluation for Suicide attempt. Constant observation .    CV - Off pressors. May be able to DC Midodrine     Pulm -  Extubated now  Aspiration precautions   Mobilze as toleerated     GI -  PPI  , Swallow evaluation resume diet if passes, may need NGT if unable to swallow     Renal - Creatnine improving of CVVH now, Potassium and acidosis normal. Will ollow with renal, make daily assessment if traditional HD still need in next 24H     Heme -  Pharmacologic DVT PPx  in addition to SCD's    ID - ABX discontinuation based on discussion with ID in conjunction with clinical features, culture data,              Goals of care considerations:  Ongoing assessment for patient specific treatment options based on progression or decline.  I have involved the family with updates and requests in guidance for medical decision making.          42  Minutes of critical care time spent in the management of this critically ill  with continuous assessments and interventions based on the interpretation of multiple databases.      At this point the patient will transfer to the Non-Covid ICU

## 2020-04-02 NOTE — AIRWAY REMOVAL NOTE  ADULT & PEDS - ARTIFICAL AIRWAY REMOVAL COMMENTS
Patient extubated to 35% venturi mask successfully tolerating well no signs of respiratory distress noted, reason for artificial airway has been resolved Spo2 99%, hr 63 bpm, RR 18 bpm will continue to monitor.

## 2020-04-02 NOTE — PROGRESS NOTE ADULT - SUBJECTIVE AND OBJECTIVE BOX
2 hour hd tolerated well, net fluid removal 1 liter.     Left in care of MICU RN, report given.     Patient was seen and evaluated on dialysis.   Patient is tolerating the procedure well.   T(C): 36.1 (20 @ 17:29), Max: 36.1 (20 @ 01:27)  HR: 94 (20 @ 17:29) (55 - 135)  BP: 109/58 (20 @ 17:00) (88/52 - 114/82)  Continue dialysis: ? in AM,   Dialyzer:  Revaclear 300        QB:  350 ml.,       QD: 500ml.,  Goal UF  1 L  over 2 Hours ,    136    |  94<L>  |  50.0<H>  ----------------------------<  122<H>  Ca:7.9<L> (2020 01:30)  3.9     |  23.0   |  3.88<H>      eGFR if Non : 11 <L>  eGFR if : 12 <L>    TPro  5.1<L>  /  Alb  2.4<L>  /  TBili  <0.2<L>  /  DBili  x      /  AST  271<H>  /  ALT  942<H>  /  AlkPhos  90     2020 01:30                                11.9   12.44<H> )-----------( 90<L>    ( 2020 02:49 )                     38.4     Phos:4.4 mg/dL M.1 mg/dL PTH:-- Uric acid:-- Serum Osm:--  Ferritin:-- Iron:-- TIBC:-- Tsat:--  B12:-- TSH:-- ( @ 01:30)    Urinalysis Basic - ( 2020 06:36 )  Color: Yellow / Appearance: Slightly Turbid<!> / S.020 / pH: x  Gluc: x / Ketone: Trace<!>  / Bili: Negative / Urobili: 1 mg/dL<!>   Blood: x / Protein: 100 mg/dL<!> / Nitrite: Negative   Leuk Esterase: Trace<!> / RBC: 11-25 /HPF<!> / WBC 3-5   Sq Epi: x / Non Sq Epi: Occasional / Bacteria: Moderate<!>

## 2020-04-03 LAB
ALBUMIN SERPL ELPH-MCNC: 2.7 G/DL — LOW (ref 3.3–5.2)
ALP SERPL-CCNC: 92 U/L — SIGNIFICANT CHANGE UP (ref 40–120)
ALT FLD-CCNC: 634 U/L — HIGH
AMMONIA BLD-MCNC: 29 UMOL/L — SIGNIFICANT CHANGE UP (ref 11–55)
ANION GAP SERPL CALC-SCNC: 20 MMOL/L — HIGH (ref 5–17)
ANION GAP SERPL CALC-SCNC: 21 MMOL/L — HIGH (ref 5–17)
ANISOCYTOSIS BLD QL: SLIGHT — SIGNIFICANT CHANGE UP
AST SERPL-CCNC: 125 U/L — HIGH
BASOPHILS # BLD AUTO: 0 K/UL — SIGNIFICANT CHANGE UP (ref 0–0.2)
BASOPHILS NFR BLD AUTO: 0 % — SIGNIFICANT CHANGE UP (ref 0–2)
BILIRUB DIRECT SERPL-MCNC: 0.1 MG/DL — SIGNIFICANT CHANGE UP (ref 0–0.3)
BILIRUB INDIRECT FLD-MCNC: 0.1 MG/DL — LOW (ref 0.2–1)
BILIRUB SERPL-MCNC: 0.2 MG/DL — LOW (ref 0.4–2)
BUN SERPL-MCNC: 33 MG/DL — HIGH (ref 8–20)
BUN SERPL-MCNC: 39 MG/DL — HIGH (ref 8–20)
CALCIUM SERPL-MCNC: 8.6 MG/DL — SIGNIFICANT CHANGE UP (ref 8.6–10.2)
CALCIUM SERPL-MCNC: 8.8 MG/DL — SIGNIFICANT CHANGE UP (ref 8.6–10.2)
CHLORIDE SERPL-SCNC: 91 MMOL/L — LOW (ref 98–107)
CHLORIDE SERPL-SCNC: 95 MMOL/L — LOW (ref 98–107)
CO2 SERPL-SCNC: 21 MMOL/L — LOW (ref 22–29)
CO2 SERPL-SCNC: 21 MMOL/L — LOW (ref 22–29)
CREAT SERPL-MCNC: 2.93 MG/DL — HIGH (ref 0.5–1.3)
CREAT SERPL-MCNC: 3.68 MG/DL — HIGH (ref 0.5–1.3)
EOSINOPHIL # BLD AUTO: 0 K/UL — SIGNIFICANT CHANGE UP (ref 0–0.5)
EOSINOPHIL NFR BLD AUTO: 0 % — SIGNIFICANT CHANGE UP (ref 0–6)
GAS PNL BLDA: SIGNIFICANT CHANGE UP
GIANT PLATELETS BLD QL SMEAR: PRESENT — SIGNIFICANT CHANGE UP
GLUCOSE BLDC GLUCOMTR-MCNC: 93 MG/DL — SIGNIFICANT CHANGE UP (ref 70–99)
GLUCOSE SERPL-MCNC: 103 MG/DL — HIGH (ref 70–99)
GLUCOSE SERPL-MCNC: 161 MG/DL — HIGH (ref 70–99)
HBA1C BLD-MCNC: 5.7 % — HIGH (ref 4–5.6)
HCT VFR BLD CALC: 33.4 % — LOW (ref 34.5–45)
HGB BLD-MCNC: 11 G/DL — LOW (ref 11.5–15.5)
INR BLD: 1.2 RATIO — HIGH (ref 0.88–1.16)
LYMPHOCYTES # BLD AUTO: 0 % — LOW (ref 13–44)
LYMPHOCYTES # BLD AUTO: 0 K/UL — LOW (ref 1–3.3)
MAGNESIUM SERPL-MCNC: 2.4 MG/DL — SIGNIFICANT CHANGE UP (ref 1.6–2.6)
MAGNESIUM SERPL-MCNC: 2.5 MG/DL — SIGNIFICANT CHANGE UP (ref 1.6–2.6)
MANUAL SMEAR VERIFICATION: SIGNIFICANT CHANGE UP
MCHC RBC-ENTMCNC: 31.3 PG — SIGNIFICANT CHANGE UP (ref 27–34)
MCHC RBC-ENTMCNC: 32.9 GM/DL — SIGNIFICANT CHANGE UP (ref 32–36)
MCV RBC AUTO: 94.9 FL — SIGNIFICANT CHANGE UP (ref 80–100)
MONOCYTES # BLD AUTO: 0.55 K/UL — SIGNIFICANT CHANGE UP (ref 0–0.9)
MONOCYTES NFR BLD AUTO: 3.5 % — SIGNIFICANT CHANGE UP (ref 2–14)
NEUTROPHILS # BLD AUTO: 15.03 K/UL — HIGH (ref 1.8–7.4)
NEUTROPHILS NFR BLD AUTO: 86.1 % — HIGH (ref 43–77)
NEUTS BAND # BLD: 10.4 % — HIGH (ref 0–8)
OVALOCYTES BLD QL SMEAR: SLIGHT — SIGNIFICANT CHANGE UP
PHOSPHATE SERPL-MCNC: 5.3 MG/DL — HIGH (ref 2.4–4.7)
PLAT MORPH BLD: NORMAL — SIGNIFICANT CHANGE UP
PLATELET # BLD AUTO: 104 K/UL — LOW (ref 150–400)
POIKILOCYTOSIS BLD QL AUTO: SLIGHT — SIGNIFICANT CHANGE UP
POTASSIUM SERPL-MCNC: 4.1 MMOL/L — SIGNIFICANT CHANGE UP (ref 3.5–5.3)
POTASSIUM SERPL-MCNC: 4.7 MMOL/L — SIGNIFICANT CHANGE UP (ref 3.5–5.3)
POTASSIUM SERPL-SCNC: 4.1 MMOL/L — SIGNIFICANT CHANGE UP (ref 3.5–5.3)
POTASSIUM SERPL-SCNC: 4.7 MMOL/L — SIGNIFICANT CHANGE UP (ref 3.5–5.3)
PROT SERPL-MCNC: 5.2 G/DL — LOW (ref 6.6–8.7)
PROTHROM AB SERPL-ACNC: 13.6 SEC — HIGH (ref 10–12.9)
RBC # BLD: 3.52 M/UL — LOW (ref 3.8–5.2)
RBC # FLD: 14.5 % — SIGNIFICANT CHANGE UP (ref 10.3–14.5)
RBC BLD AUTO: SIGNIFICANT CHANGE UP
SODIUM SERPL-SCNC: 133 MMOL/L — LOW (ref 135–145)
SODIUM SERPL-SCNC: 136 MMOL/L — SIGNIFICANT CHANGE UP (ref 135–145)
WBC # BLD: 15.58 K/UL — HIGH (ref 3.8–10.5)
WBC # FLD AUTO: 15.58 K/UL — HIGH (ref 3.8–10.5)

## 2020-04-03 PROCEDURE — 99291 CRITICAL CARE FIRST HOUR: CPT

## 2020-04-03 PROCEDURE — 99233 SBSQ HOSP IP/OBS HIGH 50: CPT

## 2020-04-03 RX ORDER — NALOXONE HYDROCHLORIDE 4 MG/.1ML
0.4 SPRAY NASAL ONCE
Refills: 0 | Status: COMPLETED | OUTPATIENT
Start: 2020-04-03 | End: 2020-04-03

## 2020-04-03 RX ADMIN — Medication 55.26 GRAM(S): at 10:13

## 2020-04-03 RX ADMIN — Medication 50 MICROGRAM(S): at 21:37

## 2020-04-03 RX ADMIN — CEFTRIAXONE 100 MILLIGRAM(S): 500 INJECTION, POWDER, FOR SOLUTION INTRAMUSCULAR; INTRAVENOUS at 06:07

## 2020-04-03 RX ADMIN — HEPARIN SODIUM 5000 UNIT(S): 5000 INJECTION INTRAVENOUS; SUBCUTANEOUS at 04:05

## 2020-04-03 RX ADMIN — PANTOPRAZOLE SODIUM 40 MILLIGRAM(S): 20 TABLET, DELAYED RELEASE ORAL at 11:37

## 2020-04-03 RX ADMIN — Medication 2 MILLIGRAM(S): at 04:49

## 2020-04-03 RX ADMIN — HEPARIN SODIUM 5000 UNIT(S): 5000 INJECTION INTRAVENOUS; SUBCUTANEOUS at 21:38

## 2020-04-03 RX ADMIN — LEVETIRACETAM 420 MILLIGRAM(S): 250 TABLET, FILM COATED ORAL at 17:19

## 2020-04-03 RX ADMIN — QUETIAPINE FUMARATE 25 MILLIGRAM(S): 200 TABLET, FILM COATED ORAL at 11:38

## 2020-04-03 RX ADMIN — NALOXONE HYDROCHLORIDE 0.4 MILLIGRAM(S): 4 SPRAY NASAL at 18:20

## 2020-04-03 RX ADMIN — LEVETIRACETAM 420 MILLIGRAM(S): 250 TABLET, FILM COATED ORAL at 04:00

## 2020-04-03 RX ADMIN — AZITHROMYCIN 250 MILLIGRAM(S): 500 TABLET, FILM COATED ORAL at 01:45

## 2020-04-03 RX ADMIN — HEPARIN SODIUM 5000 UNIT(S): 5000 INJECTION INTRAVENOUS; SUBCUTANEOUS at 11:39

## 2020-04-03 RX ADMIN — Medication 50 MICROGRAM(S): at 00:47

## 2020-04-03 NOTE — PROGRESS NOTE ADULT - ASSESSMENT
S/P OD  Remains extubated  Some wheezing and rhonchi      Plan:  1.Continue present bronchodilators  2.Titrate O2 to sat >93  3.DNR noted.

## 2020-04-03 NOTE — PROGRESS NOTE ADULT - ASSESSMENT
Acute Hypoxic respiratory failure related to Intentional Narcotic OD   Acute renal failure   ICU Delirium     Neuro - Currently not following commands, not combative  Try to void sedatives  Started on Seroquel Await Psych Evaluation for Suicide attempt. Constant observation .    CV - Off pressors. Hemodynamically stable    Pulm -  Extubated currently on venti mask with O2 sat > 90%   Aspiration precautions   Mobilize as tolerated     GI -  PPI  , Would need to consider feeding tube at this point, pt does not have cognitive status for PO    Renal - HD yesterday for 2 hrs, 1 Liter negative, renal team following    Heme -  Pharmacologic DVT PPx  in addition to SCD's    ID - ABX discontinuation based on discussion with ID in conjunction with clinical features, culture data,            E/M TIME SPENT:   35 minutes of noncontinuous time spent   Evaluating/treating patient, reviewing data/labs/imaging.

## 2020-04-03 NOTE — PROGRESS NOTE ADULT - SUBJECTIVE AND OBJECTIVE BOX
[facility] DIVISION OF KIDNEY DISEASES AND HYPERTENSION -- FOLLOW UP NOTE  --------------------------------------------------------------------------------  Chief Complaint:    24 hour events/subjective:        PAST HISTORY  --------------------------------------------------------------------------------  No significant changes to PMH, PSH, FHx, SHx, unless otherwise noted    ALLERGIES & MEDICATIONS  --------------------------------------------------------------------------------  Allergies    No Known Allergies    Intolerances      Standing Inpatient Medications  ALBUTerol    90 MICROgram(s) HFA Inhaler 2 Puff(s) Inhalation every 6 hours  azithromycin  IVPB 250 milliGRAM(s) IV Intermittent every 24 hours  azithromycin  IVPB      cefTRIAXone   IVPB 1000 milliGRAM(s) IV Intermittent every 24 hours  dextrose 5%. 1000 milliLiter(s) IV Continuous <Continuous>  dextrose 50% Injectable 12.5 Gram(s) IV Push once  dextrose 50% Injectable 25 Gram(s) IV Push once  dextrose 50% Injectable 25 Gram(s) IV Push once  heparin  Injectable 5000 Unit(s) SubCutaneous every 8 hours  insulin lispro (HumaLOG) corrective regimen sliding scale   SubCutaneous three times a day before meals  ipratropium 17 MICROgram(s) HFA Inhaler 1 Puff(s) Inhalation every 6 hours  levETIRAcetam  IVPB 500 milliGRAM(s) IV Intermittent every 12 hours  levothyroxine Injectable 50 MICROGram(s) IV Push at bedtime  midodrine 10 milliGRAM(s) Oral every 8 hours  pantoprazole  Injectable 40 milliGRAM(s) IV Push daily  QUEtiapine 25 milliGRAM(s) Oral daily    PRN Inpatient Medications  ALBUTerol    90 MICROgram(s) HFA Inhaler 1 Puff(s) Inhalation every 4 hours PRN  dextrose 40% Gel 15 Gram(s) Oral once PRN  glucagon  Injectable 1 milliGRAM(s) IntraMuscular once PRN  LORazepam   Injectable 2 milliGRAM(s) IV Push every 6 hours PRN      REVIEW OF SYSTEMS  --------------------------------------------------------------------------------  Gen: No weight changes, fatigue, fevers/chills, weakness  Skin: No rashes  Head/Eyes/Ears/Mouth: No headache; Normal hearing; Normal vision w/o blurriness; No sinus pain/discomfort, sore throat  Respiratory: No dyspnea, cough, wheezing, hemoptysis  CV: No chest pain, PND, orthopnea  GI: No abdominal pain, diarrhea, constipation, nausea, vomiting, melena, hematochezia  : No increased frequency, dysuria, hematuria, nocturia  MSK: No joint pain/swelling; no back pain; no edema  Neuro: No dizziness/lightheadedness, weakness, seizures, numbness, tingling  Heme: No easy bruising or bleeding  Endo: No heat/cold intolerance  Psych: No significant nervousness, anxiety, stress, depression    All other systems were reviewed and are negative, except as noted.    VITALS/PHYSICAL EXAM  --------------------------------------------------------------------------------  T(C): 36.1 (04-02-20 @ 17:29), Max: 36.1 (04-02-20 @ 17:29)  HR: 75 (04-03-20 @ 09:00) (58 - 132)  BP: 109/58 (04-02-20 @ 17:00) (88/52 - 114/82)  RR: 13 (04-03-20 @ 09:00) (8 - 28)  SpO2: 100% (04-03-20 @ 09:00) (95% - 100%)  Wt(kg): --    Weight (kg): 80 (04-01-20 @ 21:09)      04-02-20 @ 07:01  -  04-03-20 @ 07:00  --------------------------------------------------------  IN: 702.8 mL / OUT: 1045 mL / NET: -342.2 mL    04-03-20 @ 07:01  -  04-03-20 @ 10:59  --------------------------------------------------------  IN: 143.5 mL / OUT: 5 mL / NET: 138.5 mL      Physical Exam:  	Gen: NAD, well-appearing  	HEENT: PERRL, supple neck, clear oropharynx  	Pulm: CTA B/L  	CV: RRR, S1S2; no rub  	Back: No spinal or CVA tenderness; no sacral edema  	Abd: +BS, soft, nontender/nondistended  	: No suprapubic tenderness  	UE: Warm, FROM, no clubbing, intact strength; no edema; no asterixis  	LE: Warm, FROM, no clubbing, intact strength; no edema  	Neuro: No focal deficits, intact gait  	Psych: Normal affect and mood  	Skin: Warm, without rashes  	Vascular access:    LABS/STUDIES  --------------------------------------------------------------------------------              11.0   15.58 >-----------<  104      [04-03-20 @ 03:44]              33.4     136  |  95  |  33.0  ----------------------------<  161      [04-03-20 @ 03:44]  4.1   |  21.0  |  2.93        Ca     8.6     [04-03-20 @ 03:44]      Mg     2.5     [04-03-20 @ 03:44]      Phos  5.3     [04-03-20 @ 03:44]    TPro  5.2  /  Alb  2.7  /  TBili  0.2  /  DBili  0.1  /  AST  125  /  ALT  634  /  AlkPhos  92  [04-03-20 @ 03:44]    PT/INR: PT 13.6 , INR 1.20       [04-03-20 @ 03:44]      Creatinine Trend:  SCr 2.93 [04-03 @ 03:44]  SCr 3.88 [04-02 @ 01:30]  SCr 4.61 [04-01 @ 10:51]  SCr 4.12 [04-01 @ 02:49]  SCr 3.70 [03-31 @ 19:29]    Urinalysis - [04-01-20 @ 06:36]      Color Yellow / Appearance Slightly Turbid / SG 1.020 / pH 5.0      Gluc Negative / Ketone Trace  / Bili Negative / Urobili 1       Blood Large / Protein 100 / Leuk Est Trace / Nitrite Negative      RBC 11-25 / WBC 3-5 / Hyaline  / Gran  / Sq Epi  / Non Sq Epi Occasional / Bacteria Moderate      Ferritin 3576      [03-30-20 @ 18:51]  HbA1c 5.7      [04-03-20 @ 03:44]  TSH 0.23      [04-01-20 @ 02:49]  Lipid: chol 106, , HDL 31, LDL 27      [04-01-20 @ 02:49]    HBsAb Nonreact      [04-01-20 @ 07:33]  HBsAg Nonreact      [04-01-20 @ 07:33]  HCV 0.18, Nonreact      [04-01-20 @ 07:33]  HIV Nonreact      [04-01-20 @ 02:49] [facility] DIVISION OF KIDNEY DISEASES AND HYPERTENSION -- FOLLOW UP NOTE  --------------------------------------------------------------------------------  Chief Complaint: SUSAN/Ongoing dialysis requirement    24 hour events/subjective:  On Venturi mask with O2 saturation >95%      PAST HISTORY  --------------------------------------------------------------------------------  No significant changes to PMH, PSH, FHx, SHx, unless otherwise noted    ALLERGIES & MEDICATIONS  --------------------------------------------------------------------------------  Allergies  No Known Allergies    Standing Inpatient Medications  ALBUTerol    90 MICROgram(s) HFA Inhaler 2 Puff(s) Inhalation every 6 hours  azithromycin  IVPB 250 milliGRAM(s) IV Intermittent every 24 hours  azithromycin  IVPB      cefTRIAXone   IVPB 1000 milliGRAM(s) IV Intermittent every 24 hours  dextrose 5%. 1000 milliLiter(s) IV Continuous <Continuous>  dextrose 50% Injectable 12.5 Gram(s) IV Push once  dextrose 50% Injectable 25 Gram(s) IV Push once  dextrose 50% Injectable 25 Gram(s) IV Push once  heparin  Injectable 5000 Unit(s) SubCutaneous every 8 hours  insulin lispro (HumaLOG) corrective regimen sliding scale   SubCutaneous three times a day before meals  ipratropium 17 MICROgram(s) HFA Inhaler 1 Puff(s) Inhalation every 6 hours  levETIRAcetam  IVPB 500 milliGRAM(s) IV Intermittent every 12 hours  levothyroxine Injectable 50 MICROGram(s) IV Push at bedtime  midodrine 10 milliGRAM(s) Oral every 8 hours  pantoprazole  Injectable 40 milliGRAM(s) IV Push daily  QUEtiapine 25 milliGRAM(s) Oral daily    PRN Inpatient Medications  ALBUTerol    90 MICROgram(s) HFA Inhaler 1 Puff(s) Inhalation every 4 hours PRN  dextrose 40% Gel 15 Gram(s) Oral once PRN  glucagon  Injectable 1 milliGRAM(s) IntraMuscular once PRN  LORazepam   Injectable 2 milliGRAM(s) IV Push every 6 hours PRN      REVIEW OF SYSTEMS  --------------------------------------------------------------------------------  Gen: No fatigue, fevers/chills, weakness  Skin: No rashes  Head/Eyes/Ears/Mouth: No headache  Respiratory: No dyspnea, wheezing, hemoptysis  CV: No chest pain GI: No, Nausea, abdominal pain, diarrhea, constipation, vomiting  : No changes in urination  MSK: No joint pain/swelling; no back pain  Neuro: No dizziness/lightheadedness  Psych: No anxiety, depression    All other systems were reviewed and are negative, except as noted.    VITALS/PHYSICAL EXAM  --------------------------------------------------------------------------------  T(C): 36.1 (04-02-20 @ 17:29), Max: 36.1 (04-02-20 @ 17:29)  HR: 75 (04-03-20 @ 09:00) (58 - 132)  BP: 109/58 (04-02-20 @ 17:00) (88/52 - 114/82)  RR: 13 (04-03-20 @ 09:00) (8 - 28)  SpO2: 100% (04-03-20 @ 09:00) (95% - 100%)    Weight (kg): 80 (04-01-20 @ 21:09)    04-02-20 @ 07:01  -  04-03-20 @ 07:00  --------------------------------------------------------  IN: 702.8 mL / OUT: 1045 mL / NET: -342.2 mL    04-03-20 @ 07:01  -  04-03-20 @ 10:59  --------------------------------------------------------  IN: 143.5 mL / OUT: 5 mL / NET: 138.5 mL      Physical Exam:  Gen: NAD HEENT: supple neck  Pulm: CTA B/L. on Venturi mask  CV: RRR, S1S2; no rub  Back: No spinal or CVA tenderness; no sacral edema  Abd: +BS, soft, nontender/nondistended. +NG tube  : +Bailey catheter. No suprapubic tenderness  UE: Warm, no edema; no asterixis  LE: Warm, no edema  Neuro: No focal deficits  Psych: Normal affect and mood  Skin: Warm, without rashes  Access: Right femoral vein catheter      LABS/STUDIES  --------------------------------------------------------------------------------              11.0   15.58 >-----------<  104      [04-03-20 @ 03:44]              33.4     136  |  95  |  33.0  ----------------------------<  161      [04-03-20 @ 03:44]  4.1   |  21.0  |  2.93        Ca     8.6     [04-03-20 @ 03:44]      Mg     2.5     [04-03-20 @ 03:44]      Phos  5.3     [04-03-20 @ 03:44]    TPro  5.2  /  Alb  2.7  /  TBili  0.2  /  DBili  0.1  /  AST  125  /  ALT  634  /  AlkPhos  92  [04-03-20 @ 03:44]    PT/INR: PT 13.6 , INR 1.20       [04-03-20 @ 03:44]    Creatinine Trend:  SCr 2.93 [04-03 @ 03:44]  SCr 3.88 [04-02 @ 01:30]  SCr 4.61 [04-01 @ 10:51]  SCr 4.12 [04-01 @ 02:49]  SCr 3.70 [03-31 @ 19:29]    Urinalysis - [04-01-20 @ 06:36]      Color Yellow / Appearance Slightly Turbid / SG 1.020 / pH 5.0      Gluc Negative / Ketone Trace  / Bili Negative / Urobili 1       Blood Large / Protein 100 / Leuk Est Trace / Nitrite Negative      RBC 11-25 / WBC 3-5 / Hyaline  / Gran  / Sq Epi  / Non Sq Epi Occasional / Bacteria Moderate    Ferritin 3576      [03-30-20 @ 18:51]  HbA1c 5.7      [04-03-20 @ 03:44]  TSH 0.23      [04-01-20 @ 02:49]  Lipid: chol 106, , HDL 31, LDL 27      [04-01-20 @ 02:49]    HBsAb Nonreact      [04-01-20 @ 07:33]  HBsAg Nonreact      [04-01-20 @ 07:33]  HCV 0.18, Nonreact      [04-01-20 @ 07:33]  HIV Nonreact      [04-01-20 @ 02:49]

## 2020-04-03 NOTE — PROGRESS NOTE ADULT - ASSESSMENT
SUSAN- likely ATN  R/O COVID-19  Respiratory failure    Patient is DNR/DNI    Plan for HD tomorrow    Monitor serum creatinine    Discontinue mcneill catheter- patient not urinating

## 2020-04-03 NOTE — PROGRESS NOTE ADULT - SUBJECTIVE AND OBJECTIVE BOX
Subjective:  73yo F not responding or following commands      HPI:  74F current smoker w/ PMHx COPD, lung nodule s/p wedge resection, hypothyroid was BIBEMS with altered mental status. She was found by her family unresponsive and w/ agonal respirations around 6PM on . She was arousable but hypotensive on initial eval and was found to have multiple fentanyl patches on her which were removed by the ED nursing staff. Narcan was given which she initially responded to but soon after had a non sustained GTCS for ~ 30sec which responded to 2 mg Ativan. Intubated for airway protection. LDH, CRP and ferritin were elevated and pt is currently being r/o COVID infection. CXR w/ L retrocardiac opacity. RVP negative (31 Mar 2020 01:20)          PAST MEDICAL & SURGICAL HISTORY:  Lung nodule  COPD (chronic obstructive pulmonary disease)  Hyperlipidemia  Hypothyroid  Chronic knee pain  S/P hysterectomy  S/P  section          MEDICATIONS  (STANDING):  acetylcysteine IVPB 10 Gram(s) IV Intermittent once  ALBUTerol    90 MICROgram(s) HFA Inhaler 2 Puff(s) Inhalation every 6 hours  azithromycin  IVPB 250 milliGRAM(s) IV Intermittent every 24 hours  azithromycin  IVPB      cefTRIAXone   IVPB 1000 milliGRAM(s) IV Intermittent every 24 hours  dextrose 5%. 1000 milliLiter(s) (50 mL/Hr) IV Continuous <Continuous>  dextrose 50% Injectable 12.5 Gram(s) IV Push once  dextrose 50% Injectable 25 Gram(s) IV Push once  dextrose 50% Injectable 25 Gram(s) IV Push once  heparin  Injectable 5000 Unit(s) SubCutaneous every 8 hours  insulin lispro (HumaLOG) corrective regimen sliding scale   SubCutaneous three times a day before meals  ipratropium 17 MICROgram(s) HFA Inhaler 1 Puff(s) Inhalation every 6 hours  levETIRAcetam  IVPB 500 milliGRAM(s) IV Intermittent every 12 hours  levothyroxine Injectable 50 MICROGram(s) IV Push at bedtime  midodrine 10 milliGRAM(s) Oral every 8 hours  pantoprazole  Injectable 40 milliGRAM(s) IV Push daily  QUEtiapine 25 milliGRAM(s) Oral daily    MEDICATIONS  (PRN):  ALBUTerol    90 MICROgram(s) HFA Inhaler 1 Puff(s) Inhalation every 4 hours PRN Bronchospasm  dextrose 40% Gel 15 Gram(s) Oral once PRN Blood Glucose LESS THAN 70 milliGRAM(s)/deciliter  glucagon  Injectable 1 milliGRAM(s) IntraMuscular once PRN Glucose LESS THAN 70 milligrams/deciliter  LORazepam   Injectable 2 milliGRAM(s) IV Push every 6 hours PRN Self-Injurious behavior          Allergies    No Known Allergies    Intolerances          WEIGHTS:  Daily     Daily   Admit Wt: Drug Dosing Weight  Height (cm): 162.6 (31 Mar 2020 08:29)  Weight (kg): 80 (2020 21:09)  BMI (kg/m2): 30.3 (2020 21:09)  BSA (m2): 1.85 (2020 21:09)  I&O's Summary    2020 07:01  -  2020 07:00  --------------------------------------------------------  IN: 2071.8 mL / OUT: 1532 mL / NET: 539.8 mL    2020 07:01  -  2020 04:42  --------------------------------------------------------  IN: 570.5 mL / OUT: 1045 mL / NET: -474.5 mL        VITAL SIGNS:  ICU Vital Signs Last 24 Hrs  T(C): 36.1 (2020 17:29), Max: 36.1 (2020 17:29)  T(F): 97 (2020 17:29), Max: 97 (2020 17:29)  HR: 76 (2020 04:00) (58 - 135)  BP: 109/58 (2020 17:00) (88/52 - 114/82)  BP(mean): 78 (2020 17:00) (65 - 93)  ABP: 160/63 (2020 04:00) (79/50 - 160/63)  ABP(mean): 94 (2020 04:00) (59 - 101)  RR: 11 (2020 04:00) (8 - 28)  SpO2: 100% (2020 04:00) (95% - 100%)        All laboratory results, radiology and medications reviewed.    LABS:      136  |  95<L>  |  33.0<H>  ----------------------------<  161<H>  4.1   |  21.0<L>  |  2.93<H>    Ca    8.6      2020 03:44  Phos  5.3       Mg     2.5         TPro  5.2<L>  /  Alb  2.7<L>  /  TBili  0.2<L>  /  DBili  0.1  /  AST  125<H>  /  ALT  634<H>  /  AlkPhos  92                                   11.0   15.58 )-----------( 104      ( 2020 03:44 )             33.4          PT/INR - ( 2020 03:44 )   PT: 13.6 sec;   INR: 1.20 ratio           Bilirubin Direct, Serum: 0.1 mg/dL ( @ 03:44)  Bilirubin Total, Serum: 0.2 mg/dL ( @ 03:44)    ABG - ( 2020 23:29 )  pH, Arterial: 7.31  pH, Blood: x     /  pCO2: 47    /  pO2: 100   / HCO3: 22    / Base Excess: -2.5  /  SaO2: 98                    CAPILLARY BLOOD GLUCOSE      POCT Blood Glucose.: 127 mg/dL (2020 21:23)  POCT Blood Glucose.: 112 mg/dL (2020 11:09)  POCT Blood Glucose.: 104 mg/dL (2020 09:59)  POCT Blood Glucose.: 94 mg/dL (2020 09:00)  POCT Blood Glucose.: 87 mg/dL (2020 07:44)  POCT Blood Glucose.: 95 mg/dL (2020 06:11)  POCT Blood Glucose.: 137 mg/dL (2020 04:46)             PHYSICAL EXAM:  General:  well nourished, no acute distress  Neurology:  not following commands  Respiratory:  course to auscultation bilaterally  CV:  regular rate and rhythm, normal S1 S2  Abdomen:  soft, nontender, nondistended, positive bowel sounds  Extremities:  warm, well perfused, 1+ edema +DP pulses

## 2020-04-03 NOTE — PROGRESS NOTE ADULT - SUBJECTIVE AND OBJECTIVE BOX
PULMONARY PROGRESS NOTE      JOSE M [id number]    Patient is a 74y old  Female who presents with a chief complaint of Resp failure and seizure (2020 04:41)      INTERVAL HPI/OVERNIGHT EVENTS:  Remains extubated  On 35% VM with good sats    MEDICATIONS  (STANDING):  acetylcysteine IVPB 10 Gram(s) IV Intermittent once  ALBUTerol    90 MICROgram(s) HFA Inhaler 2 Puff(s) Inhalation every 6 hours  azithromycin  IVPB 250 milliGRAM(s) IV Intermittent every 24 hours  azithromycin  IVPB      cefTRIAXone   IVPB 1000 milliGRAM(s) IV Intermittent every 24 hours  dextrose 5%. 1000 milliLiter(s) (50 mL/Hr) IV Continuous <Continuous>  dextrose 50% Injectable 12.5 Gram(s) IV Push once  dextrose 50% Injectable 25 Gram(s) IV Push once  dextrose 50% Injectable 25 Gram(s) IV Push once  heparin  Injectable 5000 Unit(s) SubCutaneous every 8 hours  insulin lispro (HumaLOG) corrective regimen sliding scale   SubCutaneous three times a day before meals  ipratropium 17 MICROgram(s) HFA Inhaler 1 Puff(s) Inhalation every 6 hours  levETIRAcetam  IVPB 500 milliGRAM(s) IV Intermittent every 12 hours  levothyroxine Injectable 50 MICROGram(s) IV Push at bedtime  midodrine 10 milliGRAM(s) Oral every 8 hours  pantoprazole  Injectable 40 milliGRAM(s) IV Push daily  QUEtiapine 25 milliGRAM(s) Oral daily      MEDICATIONS  (PRN):  ALBUTerol    90 MICROgram(s) HFA Inhaler 1 Puff(s) Inhalation every 4 hours PRN Bronchospasm  dextrose 40% Gel 15 Gram(s) Oral once PRN Blood Glucose LESS THAN 70 milliGRAM(s)/deciliter  glucagon  Injectable 1 milliGRAM(s) IntraMuscular once PRN Glucose LESS THAN 70 milligrams/deciliter  LORazepam   Injectable 2 milliGRAM(s) IV Push every 6 hours PRN Self-Injurious behavior      Allergies    No Known Allergies    Intolerances        PAST MEDICAL & SURGICAL HISTORY:  Lung nodule  COPD (chronic obstructive pulmonary disease)  Hyperlipidemia  Hypothyroid  Chronic knee pain  S/P hysterectomy  S/P  section      SOCIAL HISTORY  Smoking History:       REVIEW OF SYSTEMS:    CONSTITUTIONAL:  No distress    HEENT:  Eyes:  No diplopia or blurred vision. ENT:  No earache, sore throat or runny nose.    CARDIOVASCULAR:  No pressure, squeezing, tightness, heaviness or aching about the chest; no palpitations.    RESPIRATORY:  No cough, shortness of breath, PND or orthopnea. Mild SOBOE    GASTROINTESTINAL:  No nausea, vomiting or diarrhea.    GENITOURINARY:  No dysuria, frequency or urgency.    MUSCULOSKELETAL:  No joint pain    SKIN:  No new lesions.    NEUROLOGIC:  Lethargic    PSYCHIATRIC:  No disorder of thought or mood.    ENDOCRINE:  No heat or cold intolerance, polyuria or polydipsia.    HEMATOLOGICAL:  No easy bruising or bleeding.     Vital Signs Last 24 Hrs  T(C): 36.1 (2020 17:29), Max: 36.1 (2020 17:29)  T(F): 97 (2020 17:29), Max: 97 (2020 17:29)  HR: 74 (2020 08:00) (58 - 135)  BP: 109/58 (2020 17:00) (88/52 - 114/82)  BP(mean): 78 (2020 17:00) (65 - 93)  RR: 12 (2020 08:00) (8 - 28)  SpO2: 100% (2020 08:00) (95% - 100%)    PHYSICAL EXAMINATION:    GENERAL: The patient is awake and alert in no apparent distress.     HEENT: Head is normocephalic and atraumatic. Extraocular muscles are intact. Mucous membranes are moist.    NECK: Supple.    LUNGS:Scattered wheezes and rhonchi    HEART: Regular rate and rhythm without murmur.    ABDOMEN: Soft, nontender, and nondistended.      EXTREMITIES: Without any cyanosis, clubbing, rash, lesions or edema.    NEUROLOGIC: Grossly intact.    SKIN: No ulceration or induration present.      LABS:                        11.0   15.58 )-----------( 104      ( 2020 03:44 )             33.4     04-03    136  |  95<L>  |  33.0<H>  ----------------------------<  161<H>  4.1   |  21.0<L>  |  2.93<H>    Ca    8.6      2020 03:44  Phos  5.3     04-03  Mg     2.5     04-03    TPro  5.2<L>  /  Alb  2.7<L>  /  TBili  0.2<L>  /  DBili  0.1  /  AST  125<H>  /  ALT  634<H>  /  AlkPhos  92  04-03    PT/INR - ( 2020 03:44 )   PT: 13.6 sec;   INR: 1.20 ratio             ABG - ( 2020 23:29 )  pH, Arterial: 7.31  pH, Blood: x     /  pCO2: 47    /  pO2: 100   / HCO3: 22    / Base Excess: -2.5  /  SaO2: 98                              MICROBIOLOGY:    RADIOLOGY & ADDITIONAL STUDIES:

## 2020-04-04 DIAGNOSIS — T40.4X2A POISONING BY OTHER SYNTHETIC NARCOTICS, INTENTIONAL SELF-HARM, INITIAL ENCOUNTER: ICD-10-CM

## 2020-04-04 DIAGNOSIS — R41.0 DISORIENTATION, UNSPECIFIED: ICD-10-CM

## 2020-04-04 LAB
ALBUMIN SERPL ELPH-MCNC: 2.7 G/DL — LOW (ref 3.3–5.2)
ALP SERPL-CCNC: 89 U/L — SIGNIFICANT CHANGE UP (ref 40–120)
ALT FLD-CCNC: 410 U/L — HIGH
AMMONIA BLD-MCNC: 34 UMOL/L — SIGNIFICANT CHANGE UP (ref 11–55)
ANION GAP SERPL CALC-SCNC: 22 MMOL/L — HIGH (ref 5–17)
APTT BLD: 27.7 SEC — SIGNIFICANT CHANGE UP (ref 27.5–36.3)
AST SERPL-CCNC: 61 U/L — HIGH
BILIRUB DIRECT SERPL-MCNC: 0.1 MG/DL — SIGNIFICANT CHANGE UP (ref 0–0.3)
BILIRUB INDIRECT FLD-MCNC: 0.1 MG/DL — LOW (ref 0.2–1)
BILIRUB SERPL-MCNC: 0.2 MG/DL — LOW (ref 0.4–2)
BUN SERPL-MCNC: 41 MG/DL — HIGH (ref 8–20)
CALCIUM SERPL-MCNC: 8.3 MG/DL — LOW (ref 8.6–10.2)
CHLORIDE SERPL-SCNC: 92 MMOL/L — LOW (ref 98–107)
CO2 SERPL-SCNC: 19 MMOL/L — LOW (ref 22–29)
CREAT SERPL-MCNC: 4.12 MG/DL — HIGH (ref 0.5–1.3)
CULTURE RESULTS: SIGNIFICANT CHANGE UP
CULTURE RESULTS: SIGNIFICANT CHANGE UP
GLUCOSE SERPL-MCNC: 97 MG/DL — SIGNIFICANT CHANGE UP (ref 70–99)
HCT VFR BLD CALC: 31.8 % — LOW (ref 34.5–45)
HGB BLD-MCNC: 10.8 G/DL — LOW (ref 11.5–15.5)
INR BLD: 1.26 RATIO — HIGH (ref 0.88–1.16)
MAGNESIUM SERPL-MCNC: 2.4 MG/DL — SIGNIFICANT CHANGE UP (ref 1.6–2.6)
MCHC RBC-ENTMCNC: 32 PG — SIGNIFICANT CHANGE UP (ref 27–34)
MCHC RBC-ENTMCNC: 34 GM/DL — SIGNIFICANT CHANGE UP (ref 32–36)
MCV RBC AUTO: 94.1 FL — SIGNIFICANT CHANGE UP (ref 80–100)
PLATELET # BLD AUTO: 108 K/UL — LOW (ref 150–400)
POTASSIUM SERPL-MCNC: 4.4 MMOL/L — SIGNIFICANT CHANGE UP (ref 3.5–5.3)
POTASSIUM SERPL-SCNC: 4.4 MMOL/L — SIGNIFICANT CHANGE UP (ref 3.5–5.3)
PROT SERPL-MCNC: 5.2 G/DL — LOW (ref 6.6–8.7)
PROTHROM AB SERPL-ACNC: 14.3 SEC — HIGH (ref 10–12.9)
RBC # BLD: 3.38 M/UL — LOW (ref 3.8–5.2)
RBC # FLD: 14.5 % — SIGNIFICANT CHANGE UP (ref 10.3–14.5)
SODIUM SERPL-SCNC: 133 MMOL/L — LOW (ref 135–145)
SPECIMEN SOURCE: SIGNIFICANT CHANGE UP
SPECIMEN SOURCE: SIGNIFICANT CHANGE UP
T4 AB SER-ACNC: 4.4 UG/DL — LOW (ref 4.5–12)
TSH SERPL-MCNC: 1.97 UIU/ML — SIGNIFICANT CHANGE UP (ref 0.27–4.2)
WBC # BLD: 11.93 K/UL — HIGH (ref 3.8–10.5)
WBC # FLD AUTO: 11.93 K/UL — HIGH (ref 3.8–10.5)

## 2020-04-04 PROCEDURE — 99232 SBSQ HOSP IP/OBS MODERATE 35: CPT

## 2020-04-04 PROCEDURE — 99233 SBSQ HOSP IP/OBS HIGH 50: CPT

## 2020-04-04 PROCEDURE — 71045 X-RAY EXAM CHEST 1 VIEW: CPT | Mod: 26

## 2020-04-04 PROCEDURE — 90937 HEMODIALYSIS REPEATED EVAL: CPT

## 2020-04-04 PROCEDURE — 99222 1ST HOSP IP/OBS MODERATE 55: CPT

## 2020-04-04 RX ADMIN — HEPARIN SODIUM 5000 UNIT(S): 5000 INJECTION INTRAVENOUS; SUBCUTANEOUS at 05:46

## 2020-04-04 RX ADMIN — MIDODRINE HYDROCHLORIDE 10 MILLIGRAM(S): 2.5 TABLET ORAL at 23:47

## 2020-04-04 RX ADMIN — HEPARIN SODIUM 5000 UNIT(S): 5000 INJECTION INTRAVENOUS; SUBCUTANEOUS at 11:50

## 2020-04-04 RX ADMIN — LEVETIRACETAM 420 MILLIGRAM(S): 250 TABLET, FILM COATED ORAL at 05:48

## 2020-04-04 RX ADMIN — LEVETIRACETAM 420 MILLIGRAM(S): 250 TABLET, FILM COATED ORAL at 16:31

## 2020-04-04 RX ADMIN — HEPARIN SODIUM 5000 UNIT(S): 5000 INJECTION INTRAVENOUS; SUBCUTANEOUS at 23:30

## 2020-04-04 RX ADMIN — AZITHROMYCIN 250 MILLIGRAM(S): 500 TABLET, FILM COATED ORAL at 00:01

## 2020-04-04 RX ADMIN — PANTOPRAZOLE SODIUM 40 MILLIGRAM(S): 20 TABLET, DELAYED RELEASE ORAL at 11:49

## 2020-04-04 RX ADMIN — CEFTRIAXONE 100 MILLIGRAM(S): 500 INJECTION, POWDER, FOR SOLUTION INTRAMUSCULAR; INTRAVENOUS at 06:07

## 2020-04-04 NOTE — PROGRESS NOTE ADULT - ASSESSMENT
SUSAN- likely ATN- normal baseline kidney function as per chart review of [facility]  Acute Hypoxic respiratory failure related to Intentional Narcotic OD   COVID-19- negative      Plan for HD today  Monitor Scr, lytes, UOP

## 2020-04-04 NOTE — PROGRESS NOTE ADULT - ASSESSMENT
Acute Hypoxic respiratory failure related to Intentional Narcotic OD   Acute renal failure   ICU Delirium     Neuro - Currently not following commands, not combative  Try to void sedatives  Started on Seroquel Await Psych Evaluation for Suicide attempt. Constant observation .    CV - Off pressors. Hemodynamically stable    Pulm -  Extubated currently on venti mask with O2 sat > 90%   Aspiration precautions   Mobilize as tolerated     GI -  PPI  , Would need to consider feeding tube at this point, pt does not have cognitive status for PO    Renal - HD planned for later today, renal team following    Heme -  Pharmacologic DVT PPx  in addition to SCD's    ID - Consider d/c ABX, Blood Cultures 3/30 and 4/1 negative to date        E/M TIME SPENT:   35 minutes of noncontinuous time spent   Evaluating/treating patient, reviewing data/labs/imaging.

## 2020-04-04 NOTE — PROGRESS NOTE ADULT - SUBJECTIVE AND OBJECTIVE BOX
Subjective:  73yo F not responding or following commands, in NAD      HPI:  74F current smoker w/ PMHx COPD, lung nodule s/p wedge resection, hypothyroid was BIBEMS with altered mental status. She was found by her family unresponsive and w/ agonal respirations around 6PM on . She was arousable but hypotensive on initial eval and was found to have multiple fentanyl patches on her which were removed by the ED nursing staff. Narcan was given which she initially responded to but soon after had a non sustained GTCS for ~ 30sec which responded to 2 mg Ativan. Intubated for airway protection. LDH, CRP and ferritin were elevated and pt is currently being r/o COVID infection. CXR w/ L retrocardiac opacity. RVP negative (31 Mar 2020 01:20)          PAST MEDICAL & SURGICAL HISTORY:  Lung nodule  COPD (chronic obstructive pulmonary disease)  Hyperlipidemia  Hypothyroid  Chronic knee pain  S/P hysterectomy  S/P  section          MEDICATIONS  (STANDING):  ALBUTerol    90 MICROgram(s) HFA Inhaler 2 Puff(s) Inhalation every 6 hours  cefTRIAXone   IVPB 1000 milliGRAM(s) IV Intermittent every 24 hours  dextrose 5%. 1000 milliLiter(s) (50 mL/Hr) IV Continuous <Continuous>  dextrose 50% Injectable 12.5 Gram(s) IV Push once  dextrose 50% Injectable 25 Gram(s) IV Push once  dextrose 50% Injectable 25 Gram(s) IV Push once  heparin  Injectable 5000 Unit(s) SubCutaneous every 8 hours  ipratropium 17 MICROgram(s) HFA Inhaler 1 Puff(s) Inhalation every 6 hours  levETIRAcetam  IVPB 500 milliGRAM(s) IV Intermittent every 12 hours  levothyroxine Injectable 50 MICROGram(s) IV Push at bedtime  midodrine 10 milliGRAM(s) Oral every 8 hours  pantoprazole  Injectable 40 milliGRAM(s) IV Push daily  QUEtiapine 25 milliGRAM(s) Oral daily    MEDICATIONS  (PRN):  ALBUTerol    90 MICROgram(s) HFA Inhaler 1 Puff(s) Inhalation every 4 hours PRN Bronchospasm  dextrose 40% Gel 15 Gram(s) Oral once PRN Blood Glucose LESS THAN 70 milliGRAM(s)/deciliter  glucagon  Injectable 1 milliGRAM(s) IntraMuscular once PRN Glucose LESS THAN 70 milligrams/deciliter  LORazepam   Injectable 2 milliGRAM(s) IV Push every 6 hours PRN Self-Injurious behavior          Allergies    No Known Allergies    Intolerances          WEIGHTS:  Daily     Daily   Admit Wt: Drug Dosing Weight  Height (cm): 162.6 (31 Mar 2020 08:29)  Weight (kg): 80 (2020 21:09)  BMI (kg/m2): 30.3 (2020 21:09)  BSA (m2): 1.85 (2020 21:09)  I&O's Summary    2020 07:01  -  2020 07:00  --------------------------------------------------------  IN: 702.8 mL / OUT: 1045 mL / NET: -342.2 mL    2020 07:01  -  2020 04:37  --------------------------------------------------------  IN: 1355 mL / OUT: 120 mL / NET: 1235 mL        VITAL SIGNS:  ICU Vital Signs Last 24 Hrs  T(C): 37.5 (2020 20:00), Max: 37.5 (2020 20:00)  T(F): 99.5 (2020 20:00), Max: 99.5 (2020 20:00)  HR: 99 (2020 00:00) (74 - 109)  BP: --  BP(mean): --  ABP: 134/65 (2020 00:00) (120/54 - 156/74)  ABP(mean): 88 (2020 00:00) (74 - 104)  RR: 26 (2020 00:00) (9 - 26)  SpO2: 98% (2020 00:00) (95% - 100%)        All laboratory results, radiology and medications reviewed.    LABS:      133<L>  |  91<L>  |  39.0<H>  ----------------------------<  103<H>  4.7   |  21.0<L>  |  3.68<H>    Ca    8.8      2020 19:20  Phos  5.3     04-  Mg     2.4     -    TPro  5.2<L>  /  Alb  2.7<L>  /  TBili  0.2<L>  /  DBili  0.1  /  AST  125<H>  /  ALT  634<H>  /  AlkPhos  92  -                                 10.8   11.93 )-----------( 108      ( 2020 03:57 )             31.8          PT/INR - ( 2020 03:57 )   PT: 14.3 sec;   INR: 1.26 ratio         PTT - ( 2020 03:57 )  PTT:27.7 sec    ABG - ( 2020 12:15 )  pH, Arterial: 7.30  pH, Blood: x     /  pCO2: 46    /  pO2: 98    / HCO3: 21    / Base Excess: -3.9  /  SaO2: 98                    CAPILLARY BLOOD GLUCOSE      POCT Blood Glucose.: 93 mg/dL (2020 11:29)             PHYSICAL EXAM:  General:  well nourished, no acute distress  Neurology:  not responding or following commands  Respiratory:  course to auscultation bilaterally  CV:  AFib, alternating with irreg sinus  Abdomen:  soft, nontender, nondistended, positive bowel sounds  Extremities:  warm, well perfused, no edema +DP pulses

## 2020-04-04 NOTE — BEHAVIORAL HEALTH ASSESSMENT NOTE - NSBHCHARTREVIEWIMAGING_PSY_A_CORE FT
< from: CT Head No Cont (03.30.20 @ 19:52) >    IMPRESSION:  No acute intracranial hemorrhage or acute territorial infarct.  If symptoms persist, follow-up MRI exam recommended.    Nonspecific incidental asymmetric prominent left superior ophthalmic vein.    Nonspecific linear hyperdensity partially visualized at the level of oral cavity on image 1. Correlate clinically    < end of copied text >

## 2020-04-04 NOTE — BEHAVIORAL HEALTH ASSESSMENT NOTE - NSBHCHARTREVIEWVS_PSY_A_CORE FT
Vital Signs Last 24 Hrs  T(C): 36.9 (04 Apr 2020 08:00), Max: 37.5 (03 Apr 2020 20:00)  T(F): 98.4 (04 Apr 2020 08:00), Max: 99.5 (03 Apr 2020 20:00)  HR: 100 (04 Apr 2020 08:00) (76 - 109)  RR: 12 (04 Apr 2020 08:00) (10 - 26)  SpO2: 98% (04 Apr 2020 08:00) (95% - 100%)

## 2020-04-04 NOTE — PROGRESS NOTE ADULT - ASSESSMENT
S/P respiratory failure related to presumptive OD.    Renal function somewhat worsened  Extubated and tolerating nasal cannula  Patient is DNR    Plan:  1.Titrate O2 as able  2.Dialysis per renal   3.Continue bronchodilators

## 2020-04-04 NOTE — PROGRESS NOTE ADULT - SUBJECTIVE AND OBJECTIVE BOX
[facility] DIVISION OF KIDNEY DISEASES AND HYPERTENSION -- HEMODIALYSIS NOTE  --------------------------------------------------------------------------------  Chief Complaint: SUSAN /Ongoing hemodialysis requirement    24 hour events/subjective:  Extubated this AM to nasal cannula      PAST HISTORY  --------------------------------------------------------------------------------  No significant changes to PMH, PSH, FHx, SHx, unless otherwise noted    ALLERGIES & MEDICATIONS  --------------------------------------------------------------------------------  Allergies    No Known Allergies    Intolerances      Standing Inpatient Medications  ALBUTerol    90 MICROgram(s) HFA Inhaler 2 Puff(s) Inhalation every 6 hours  dextrose 5%. 1000 milliLiter(s) IV Continuous <Continuous>  dextrose 50% Injectable 12.5 Gram(s) IV Push once  dextrose 50% Injectable 25 Gram(s) IV Push once  dextrose 50% Injectable 25 Gram(s) IV Push once  heparin  Injectable 5000 Unit(s) SubCutaneous every 8 hours  ipratropium 17 MICROgram(s) HFA Inhaler 1 Puff(s) Inhalation every 6 hours  levETIRAcetam  IVPB 500 milliGRAM(s) IV Intermittent every 12 hours  levothyroxine Injectable 50 MICROGram(s) IV Push at bedtime  midodrine 10 milliGRAM(s) Oral every 8 hours  pantoprazole  Injectable 40 milliGRAM(s) IV Push daily  QUEtiapine 25 milliGRAM(s) Oral daily    PRN Inpatient Medications  ALBUTerol    90 MICROgram(s) HFA Inhaler 1 Puff(s) Inhalation every 4 hours PRN  dextrose 40% Gel 15 Gram(s) Oral once PRN  glucagon  Injectable 1 milliGRAM(s) IntraMuscular once PRN  LORazepam   Injectable 2 milliGRAM(s) IV Push every 6 hours PRN      REVIEW OF SYSTEMS  --------------------------------------------------------------------------------  unable to obtain    VITALS/PHYSICAL EXAM  --------------------------------------------------------------------------------  T(C): 37.4 (04-04-20 @ 12:00), Max: 37.5 (04-03-20 @ 20:00)  HR: 93 (04-04-20 @ 12:00) (76 - 109)  BP: --  RR: 17 (04-04-20 @ 12:00) (10 - 26)  SpO2: 97% (04-04-20 @ 12:00) (95% - 100%)  Wt(kg): --        04-03-20 @ 07:01  -  04-04-20 @ 07:00  --------------------------------------------------------  IN: 1726.2 mL / OUT: 220 mL / NET: 1506.2 mL    04-04-20 @ 07:01  -  04-04-20 @ 13:43  --------------------------------------------------------  IN: 0 mL / OUT: 105 mL / NET: -105 mL      Physical Exam:  	    LABS/STUDIES  --------------------------------------------------------------------------------              10.8   11.93 >-----------<  108      [04-04-20 @ 03:57]              31.8     133  |  92  |  41.0  ----------------------------<  97      [04-04-20 @ 03:57]  4.4   |  19.0  |  4.12        Ca     8.3     [04-04-20 @ 03:57]      Mg     2.4     [04-04-20 @ 03:57]      Phos  5.3     [04-03-20 @ 03:44]    TPro  5.2  /  Alb  2.7  /  TBili  0.2  /  DBili  0.1  /  AST  61  /  ALT  410  /  AlkPhos  89  [04-04-20 @ 03:57]    PT/INR: PT 14.3 , INR 1.26       [04-04-20 @ 03:57]  PTT: 27.7       [04-04-20 @ 03:57]      Ferritin 3576      [03-30-20 @ 18:51]  HbA1c 5.7      [04-03-20 @ 03:44]  TSH 1.97      [04-04-20 @ 03:57]  Lipid: chol 106, , HDL 31, LDL 27      [04-01-20 @ 02:49]    HBsAb Nonreact      [04-01-20 @ 07:33]  HBsAg Nonreact      [04-01-20 @ 07:33]  HCV 0.18, Nonreact      [04-01-20 @ 07:33]  HIV Nonreact      [04-01-20 @ 02:49]

## 2020-04-04 NOTE — BEHAVIORAL HEALTH ASSESSMENT NOTE - ACTIVATING EVENTS/STRESSORS
Perceived burden on family or others/Change in provider or treatment (i.e., medications, psychotherapy, milieu)/Current or pending social isolation

## 2020-04-04 NOTE — BEHAVIORAL HEALTH ASSESSMENT NOTE - HPI (INCLUDE ILLNESS QUALITY, SEVERITY, DURATION, TIMING, CONTEXT, MODIFYING FACTORS, ASSOCIATED SIGNS AND SYMPTOMS)
found by daughter unresponsive Left a detailed suicide note Used Fentanyl patches that belonged to her  who passed away in 2014  Patient has no psychiatric history per her adult children both of whom a re shocked by  this event Patient lived in specially made apartment in daughter's home after death of her . She was a heavy smoker and earlier this week an argument  occurred  over her wanting to go out to buy cigarettes despite COVID pandemic Patient had  a chronic pain syndrome from a peripheral neuropathy and was on hydrocodone 3 times per day Recently she voiced concern over changes in her pain management provider Daughter felt her personality was more irritable and antagonistic  in last few weeks  ED - hospital course noted   chart indicates plan for dialysis today due to acute kidney failure

## 2020-04-04 NOTE — PROGRESS NOTE ADULT - SUBJECTIVE AND OBJECTIVE BOX
Patient is a 74y old  Female who presents with a chief complaint of Resp failure and seizure (2020 04:37)      BRIEF HOSPITAL COURSE: ***  S/P overdose>intubated.  Acetylcysteine>now completed  Events last 24 hours: ***  On nasal cannula with good sats  Remains somewhat lethargic but responds to voice  PAST MEDICAL & SURGICAL HISTORY:  Lung nodule  COPD (chronic obstructive pulmonary disease)  Hyperlipidemia  Hypothyroid  Chronic knee pain  S/P hysterectomy  S/P  section        Medications:    midodrine 10 milliGRAM(s) Oral every 8 hours    ALBUTerol    90 MICROgram(s) HFA Inhaler 2 Puff(s) Inhalation every 6 hours  ALBUTerol    90 MICROgram(s) HFA Inhaler 1 Puff(s) Inhalation every 4 hours PRN  ipratropium 17 MICROgram(s) HFA Inhaler 1 Puff(s) Inhalation every 6 hours    levETIRAcetam  IVPB 500 milliGRAM(s) IV Intermittent every 12 hours  LORazepam   Injectable 2 milliGRAM(s) IV Push every 6 hours PRN  QUEtiapine 25 milliGRAM(s) Oral daily      heparin  Injectable 5000 Unit(s) SubCutaneous every 8 hours    pantoprazole  Injectable 40 milliGRAM(s) IV Push daily      dextrose 40% Gel 15 Gram(s) Oral once PRN  dextrose 50% Injectable 12.5 Gram(s) IV Push once  dextrose 50% Injectable 25 Gram(s) IV Push once  dextrose 50% Injectable 25 Gram(s) IV Push once  glucagon  Injectable 1 milliGRAM(s) IntraMuscular once PRN  levothyroxine Injectable 50 MICROGram(s) IV Push at bedtime    dextrose 5%. 1000 milliLiter(s) IV Continuous <Continuous>                ICU Vital Signs Last 24 Hrs  T(C): 36.9 (2020 08:00), Max: 37.5 (2020 20:00)  T(F): 98.4 (2020 08:00), Max: 99.5 (2020 20:00)  HR: 100 (2020 08:00) (76 - 109)  BP: --  BP(mean): --  ABP: 143/68 (2020 08:00) (120/54 - 156/74)  ABP(mean): 98 (2020 08:00) (74 - 104)  RR: 12 (2020 08:00) (10 - 26)  SpO2: 98% (2020 08:00) (95% - 100%)      ABG - ( 2020 12:15 )  pH, Arterial: 7.30  pH, Blood: x     /  pCO2: 46    /  pO2: 98    / HCO3: 21    / Base Excess: -3.9  /  SaO2: 98                  I&O's Detail    2020 07:01  -  2020 07:00  --------------------------------------------------------  IN:    Free Water: 50 mL    Solution: 1176.2 mL    Solution: 250 mL    Solution: 50 mL    Solution: 200 mL  Total IN: 1726.2 mL    OUT:    Indwelling Catheter - Urethral: 220 mL  Total OUT: 220 mL    Total NET: 1506.2 mL      2020 07:01  -  2020 09:41  --------------------------------------------------------  IN:  Total IN: 0 mL    OUT:    Indwelling Catheter - Urethral: 45 mL  Total OUT: 45 mL    Total NET: -45 mL            LABS:                        10.8   11.93 )-----------( 108      ( 2020 03:57 )             31.8     04-04    133<L>  |  92<L>  |  41.0<H>  ----------------------------<  97  4.4   |  19.0<L>  |  4.12<H>    Ca    8.3<L>      2020 03:57  Phos  5.3     04-03  Mg     2.4     04-04    TPro  5.2<L>  /  Alb  2.7<L>  /  TBili  0.2<L>  /  DBili  0.1  /  AST  61<H>  /  ALT  410<H>  /  AlkPhos  89  04-04          CAPILLARY BLOOD GLUCOSE      POCT Blood Glucose.: 93 mg/dL (2020 11:29)    PT/INR - ( 2020 03:57 )   PT: 14.3 sec;   INR: 1.26 ratio         PTT - ( 2020 03:57 )  PTT:27.7 sec    CULTURES:  Culture Results:   No growth at 48 hours ( @ 06:40)  Culture Results:   No growth at 48 hours ( @ 18:53)  Culture Results:   No growth at 48 hours ( @ 18:53)      Physical Examination:    General: No acute distress.      HEENT: Pupils equal, reactive to light.  Symmetric.    PULM: Clear to auscultation bilaterally, no significant sputum production    NECK: Supple, no lymphadenopathy, trachea midline    CVS: Regular rate and rhythm, no murmurs, rubs, or gallops    ABD: Soft, nondistended, nontender, normoactive bowel sounds, no masses    EXT: No edema, nontender    SKIN: Warm and well perfused, no rashes noted.    NEURO:lethargic, nonfocal    DEVICES:     RADIOLOGY: ***  < from: Xray Chest 1 View- PORTABLE-Routine (20 @ 06:37) >   EXAM:  XR CHEST PORTABLE ROUTINE 1V                          PROCEDURE DATE:  2020          INTERPRETATION:  TECHNIQUE: Single portable view of the chest.    COMPARISON: 3/31/2020    CLINICAL HISTORY: Unconscious,sob    FINDINGS:    Single frontal view of the chest demonstrates the lungs to be clear. The cardiomediastinal silhouette is normal. No acute osseous abnormalities. Overlying EKG leads and wires are noted. Low lung volumes. Status post extubation. NG tube courses below the hemidiaphragm. Right-sided central venous catheter is unchanged.    IMPRESSION: Status post extubation.                TONA VARELA   This document has been electronically signed. 2020  8:22AM        < end of copied text >    CRITICAL CARE TIME SPENT: ***

## 2020-04-04 NOTE — BEHAVIORAL HEALTH ASSESSMENT NOTE - NSBHCHARTREVIEWLAB_PSY_A_CORE FT
10.8   11.93 )-----------( 108      ( 04 Apr 2020 03:57 )             31.8     04-04    133<L>  |  92<L>  |  41.0<H>  ----------------------------<  97  4.4   |  19.0<L>  |  4.12<H>    Ca    8.3<L>      04 Apr 2020 03:57  Phos  5.3     04-03  Mg     2.4     04-04    TPro  5.2<L>  /  Alb  2.7<L>  /  TBili  0.2<L>  /  DBili  0.1  /  AST  61<H>  /  ALT  410<H>  /  AlkPhos  89  04-04    LIVER FUNCTIONS - ( 04 Apr 2020 03:57 )  Alb: 2.7 g/dL / Pro: 5.2 g/dL / ALK PHOS: 89 U/L / ALT: 410 U/L / AST: 61 U/L / GGT: x           PT/INR - ( 04 Apr 2020 03:57 )   PT: 14.3 sec;   INR: 1.26 ratio         PTT - ( 04 Apr 2020 03:57 )  PTT:27.7 sec

## 2020-04-05 LAB
ANION GAP SERPL CALC-SCNC: 24 MMOL/L — HIGH (ref 5–17)
BUN SERPL-MCNC: 41 MG/DL — HIGH (ref 8–20)
CALCIUM SERPL-MCNC: 8.6 MG/DL — SIGNIFICANT CHANGE UP (ref 8.6–10.2)
CHLORIDE SERPL-SCNC: 93 MMOL/L — LOW (ref 98–107)
CO2 SERPL-SCNC: 19 MMOL/L — LOW (ref 22–29)
CREAT SERPL-MCNC: 4.34 MG/DL — HIGH (ref 0.5–1.3)
GLUCOSE BLDC GLUCOMTR-MCNC: 102 MG/DL — HIGH (ref 70–99)
GLUCOSE BLDC GLUCOMTR-MCNC: 86 MG/DL — SIGNIFICANT CHANGE UP (ref 70–99)
GLUCOSE SERPL-MCNC: 55 MG/DL — LOW (ref 70–99)
HCT VFR BLD CALC: 32 % — LOW (ref 34.5–45)
HGB BLD-MCNC: 10.7 G/DL — LOW (ref 11.5–15.5)
MAGNESIUM SERPL-MCNC: 2.4 MG/DL — SIGNIFICANT CHANGE UP (ref 1.6–2.6)
MCHC RBC-ENTMCNC: 31.6 PG — SIGNIFICANT CHANGE UP (ref 27–34)
MCHC RBC-ENTMCNC: 33.4 GM/DL — SIGNIFICANT CHANGE UP (ref 32–36)
MCV RBC AUTO: 94.4 FL — SIGNIFICANT CHANGE UP (ref 80–100)
PLATELET # BLD AUTO: 124 K/UL — LOW (ref 150–400)
POTASSIUM SERPL-MCNC: 4.3 MMOL/L — SIGNIFICANT CHANGE UP (ref 3.5–5.3)
POTASSIUM SERPL-SCNC: 4.3 MMOL/L — SIGNIFICANT CHANGE UP (ref 3.5–5.3)
RBC # BLD: 3.39 M/UL — LOW (ref 3.8–5.2)
RBC # FLD: 14.5 % — SIGNIFICANT CHANGE UP (ref 10.3–14.5)
SODIUM SERPL-SCNC: 136 MMOL/L — SIGNIFICANT CHANGE UP (ref 135–145)
WBC # BLD: 10.21 K/UL — SIGNIFICANT CHANGE UP (ref 3.8–10.5)
WBC # FLD AUTO: 10.21 K/UL — SIGNIFICANT CHANGE UP (ref 3.8–10.5)

## 2020-04-05 PROCEDURE — 99233 SBSQ HOSP IP/OBS HIGH 50: CPT

## 2020-04-05 PROCEDURE — 99232 SBSQ HOSP IP/OBS MODERATE 35: CPT

## 2020-04-05 PROCEDURE — 71045 X-RAY EXAM CHEST 1 VIEW: CPT | Mod: 26

## 2020-04-05 RX ORDER — DEXTROSE 50 % IN WATER 50 %
12.5 SYRINGE (ML) INTRAVENOUS ONCE
Refills: 0 | Status: COMPLETED | OUTPATIENT
Start: 2020-04-05 | End: 2020-04-05

## 2020-04-05 RX ORDER — INSULIN LISPRO 100/ML
VIAL (ML) SUBCUTANEOUS EVERY 6 HOURS
Refills: 0 | Status: DISCONTINUED | OUTPATIENT
Start: 2020-04-05 | End: 2020-04-07

## 2020-04-05 RX ADMIN — LEVETIRACETAM 420 MILLIGRAM(S): 250 TABLET, FILM COATED ORAL at 17:40

## 2020-04-05 RX ADMIN — Medication 12.5 GRAM(S): at 07:52

## 2020-04-05 RX ADMIN — HEPARIN SODIUM 5000 UNIT(S): 5000 INJECTION INTRAVENOUS; SUBCUTANEOUS at 05:00

## 2020-04-05 RX ADMIN — HEPARIN SODIUM 5000 UNIT(S): 5000 INJECTION INTRAVENOUS; SUBCUTANEOUS at 12:49

## 2020-04-05 RX ADMIN — MIDODRINE HYDROCHLORIDE 10 MILLIGRAM(S): 2.5 TABLET ORAL at 12:50

## 2020-04-05 RX ADMIN — QUETIAPINE FUMARATE 25 MILLIGRAM(S): 200 TABLET, FILM COATED ORAL at 11:43

## 2020-04-05 RX ADMIN — Medication 50 MICROGRAM(S): at 20:59

## 2020-04-05 RX ADMIN — LEVETIRACETAM 420 MILLIGRAM(S): 250 TABLET, FILM COATED ORAL at 07:52

## 2020-04-05 RX ADMIN — MIDODRINE HYDROCHLORIDE 10 MILLIGRAM(S): 2.5 TABLET ORAL at 07:52

## 2020-04-05 RX ADMIN — HEPARIN SODIUM 5000 UNIT(S): 5000 INJECTION INTRAVENOUS; SUBCUTANEOUS at 20:59

## 2020-04-05 RX ADMIN — Medication 50 MICROGRAM(S): at 00:55

## 2020-04-05 RX ADMIN — PANTOPRAZOLE SODIUM 40 MILLIGRAM(S): 20 TABLET, DELAYED RELEASE ORAL at 11:43

## 2020-04-05 NOTE — PROGRESS NOTE ADULT - SUBJECTIVE AND OBJECTIVE BOX
Subjective:  75yo F slightly more responsive today, opens eyes.      HPI:  74F current smoker w/ PMHx COPD, lung nodule s/p wedge resection, hypothyroid was BIBEMS with altered mental status. She was found by her family unresponsive and w/ agonal respirations around 6PM on . She was arousable but hypotensive on initial eval and was found to have multiple fentanyl patches on her which were removed by the ED nursing staff. Narcan was given which she initially responded to but soon after had a non sustained GTCS for ~ 30sec which responded to 2 mg Ativan. Intubated for airway protection. LDH, CRP and ferritin were elevated and pt is currently being r/o COVID infection. CXR w/ L retrocardiac opacity. RVP negative (31 Mar 2020 01:20)          PAST MEDICAL & SURGICAL HISTORY:  Lung nodule  COPD (chronic obstructive pulmonary disease)  Hyperlipidemia  Hypothyroid  Chronic knee pain  S/P hysterectomy  S/P  section          MEDICATIONS  (STANDING):  ALBUTerol    90 MICROgram(s) HFA Inhaler 2 Puff(s) Inhalation every 6 hours  dextrose 5%. 1000 milliLiter(s) (50 mL/Hr) IV Continuous <Continuous>  dextrose 50% Injectable 12.5 Gram(s) IV Push once  dextrose 50% Injectable 25 Gram(s) IV Push once  dextrose 50% Injectable 25 Gram(s) IV Push once  heparin  Injectable 5000 Unit(s) SubCutaneous every 8 hours  ipratropium 17 MICROgram(s) HFA Inhaler 1 Puff(s) Inhalation every 6 hours  levETIRAcetam  IVPB 500 milliGRAM(s) IV Intermittent every 12 hours  levothyroxine Injectable 50 MICROGram(s) IV Push at bedtime  midodrine 10 milliGRAM(s) Oral every 8 hours  pantoprazole  Injectable 40 milliGRAM(s) IV Push daily  QUEtiapine 25 milliGRAM(s) Oral daily    MEDICATIONS  (PRN):  ALBUTerol    90 MICROgram(s) HFA Inhaler 1 Puff(s) Inhalation every 4 hours PRN Bronchospasm  dextrose 40% Gel 15 Gram(s) Oral once PRN Blood Glucose LESS THAN 70 milliGRAM(s)/deciliter  glucagon  Injectable 1 milliGRAM(s) IntraMuscular once PRN Glucose LESS THAN 70 milligrams/deciliter          Allergies    No Known Allergies    Intolerances          WEIGHTS:  Daily     Daily   Admit Wt: Drug Dosing Weight  Height (cm): 162.6 (31 Mar 2020 08:29)  Weight (kg): 80 (2020 21:09)  BMI (kg/m2): 30.3 (2020 21:09)  BSA (m2): 1.85 (2020 21:09)  I&O's Summary    2020 07:01  -  2020 07:00  --------------------------------------------------------  IN: 1726.2 mL / OUT: 220 mL / NET: 1506.2 mL    2020 07:01  -  2020 02:06  --------------------------------------------------------  IN: 100 mL / OUT: 1955 mL / NET: -1855 mL        VITAL SIGNS:  ICU Vital Signs Last 24 Hrs  T(C): 36.9 (2020 22:00), Max: 37.4 (2020 12:00)  T(F): 98.4 (:00), Max: 99.3 (2020 12:00)  HR: 105 (:) (93 - 988)  BP: 137/55 (:00) (127/63 - 145/57)  BP(mean): 576 (:) (78 - 576)  ABP: 134/61 (:00) (134/61 - 148/71)  ABP(mean): 86 (:00) (86 - 100)  RR: 14 (:) (12 - 17)  SpO2: 94% (:) (92% - 99%)        All laboratory results, radiology and medications reviewed.    LABS:      133<L>  |  92<L>  |  41.0<H>  ----------------------------<  97  4.4   |  19.0<L>  |  4.12<H>    Ca    8.3<L>      2020 03:57  Phos  5.3       Mg     2.4         TPro  5.2<L>  /  Alb  2.7<L>  /  TBili  0.2<L>  /  DBili  0.1  /  AST  61<H>  /  ALT  410<H>  /  AlkPhos  89                                   10.8   11.93 )-----------( 108      ( 2020 03:57 )             31.8          PT/INR - ( 2020 03:57 )   PT: 14.3 sec;   INR: 1.26 ratio         PTT - ( 2020 03:57 )  PTT:27.7 sec  Bilirubin Direct, Serum: 0.1 mg/dL ( @ 03:57)  Bilirubin Total, Serum: 0.2 mg/dL ( @ 03:57)    ABG - ( 2020 12:15 )  pH, Arterial: 7.30  pH, Blood: x     /  pCO2: 46    /  pO2: 98    / HCO3: 21    / Base Excess: -3.9  /  SaO2: 98                    CAPILLARY BLOOD GLUCOSE                 PHYSICAL EXAM:  General:  well nourished, no acute distress  Neurology:  somnolent minimally responsive  Respiratory:  clear to auscultation bilaterally  CV:  regular rate and rhythm, normal S1 S2  Abdomen:  soft, nontender, nondistended, positive bowel sounds  Extremities:  warm, well perfused, 1+ edema +DP pulses

## 2020-04-05 NOTE — PROGRESS NOTE ADULT - SUBJECTIVE AND OBJECTIVE BOX
PULMONARY PROGRESS NOTE      JOSE M KEARNEYYUNIEL-[id number]    Patient is a 74y old  Female who presents with a chief complaint of Resp failure and seizure (2020 11:41)      INTERVAL HPI/OVERNIGHT EVENTS:  S/P HD  No new changes  On nasal O2 with good sats  Remains lethargic but more arousable to voice.  Gazes to threat/noxious stimuli  MEDICATIONS  (STANDING):  ALBUTerol    90 MICROgram(s) HFA Inhaler 2 Puff(s) Inhalation every 6 hours  dextrose 5%. 1000 milliLiter(s) (50 mL/Hr) IV Continuous <Continuous>  dextrose 50% Injectable 12.5 Gram(s) IV Push once  dextrose 50% Injectable 25 Gram(s) IV Push once  dextrose 50% Injectable 25 Gram(s) IV Push once  heparin  Injectable 5000 Unit(s) SubCutaneous every 8 hours  ipratropium 17 MICROgram(s) HFA Inhaler 1 Puff(s) Inhalation every 6 hours  levETIRAcetam  IVPB 500 milliGRAM(s) IV Intermittent every 12 hours  levothyroxine Injectable 50 MICROGram(s) IV Push at bedtime  midodrine 10 milliGRAM(s) Oral every 8 hours  pantoprazole  Injectable 40 milliGRAM(s) IV Push daily  QUEtiapine 25 milliGRAM(s) Oral daily      MEDICATIONS  (PRN):  ALBUTerol    90 MICROgram(s) HFA Inhaler 1 Puff(s) Inhalation every 4 hours PRN Bronchospasm  dextrose 40% Gel 15 Gram(s) Oral once PRN Blood Glucose LESS THAN 70 milliGRAM(s)/deciliter  glucagon  Injectable 1 milliGRAM(s) IntraMuscular once PRN Glucose LESS THAN 70 milligrams/deciliter      Allergies    No Known Allergies    Intolerances        PAST MEDICAL & SURGICAL HISTORY:  Lung nodule  COPD (chronic obstructive pulmonary disease)  Hyperlipidemia  Hypothyroid  Chronic knee pain  S/P hysterectomy  S/P  section      SOCIAL HISTORY  Smoking History:       REVIEW OF SYSTEMS:Unobtainable>nonverbal    CONSTITUTIONAL:  No distress        Vital Signs Last 24 Hrs  T(C): 36.9 (2020 05:00), Max: 37.4 (2020 12:00)  T(F): 98.4 (2020 05:00), Max: 99.3 (2020 12:00)  HR: 87 (2020 06:00) (87 - 988)  BP: 105/48 (2020 06:00) (105/48 - 148/55)  BP(mean): 63 (2020 06:00) (63 - 576)  RR: 16 (2020 06:00) (14 - 17)  SpO2: 97% (2020 06:00) (92% - 97%)    PHYSICAL EXAMINATION:    GENERAL: Lethargic in no apparent distress.     HEENT: Head is normocephalic and atraumatic. Extraocular muscles are intact. Mucous membranes are moist.    NECK: Supple.    LUNGS: Clear to auscultation without wheezing, rales or rhonchi; respirations unlabored    HEART: Regular rate and rhythm without murmur.    ABDOMEN: Soft, nontender, and nondistended.      EXTREMITIES: Without any cyanosis, clubbing, rash, lesions or edema.    NEUROLOGIC: Moves all extremities to noxious stimuli    SKIN: No ulceration or induration present.      LABS:                        10.7   10.21 )-----------( 124      ( 2020 03:56 )             32.0     04-05    136  |  93<L>  |  41.0<H>  ----------------------------<  55<L>  4.3   |  19.0<L>  |  4.34<H>    Ca    8.6      2020 03:56  Mg     2.4     04-05    TPro  5.2<L>  /  Alb  2.7<L>  /  TBili  0.2<L>  /  DBili  0.1  /  AST  61<H>  /  ALT  410<H>  /  AlkPhos  89  04-04    PT/INR - ( 2020 03:57 )   PT: 14.3 sec;   INR: 1.26 ratio         PTT - ( 2020 03:57 )  PTT:27.7 sec    ABG - ( 2020 12:15 )  pH, Arterial: 7.30  pH, Blood: x     /  pCO2: 46    /  pO2: 98    / HCO3: 21    / Base Excess: -3.9  /  SaO2: 98                              MICROBIOLOGY:    RADIOLOGY & ADDITIONAL STUDIES:

## 2020-04-05 NOTE — PROGRESS NOTE ADULT - ASSESSMENT
SUSAN- likely ATN- normal baseline kidney function as per chart review of [facility]  Acute Hypoxic respiratory failure related to Intentional Narcotic OD   COVID-19- negative  Anemia    No indication for HD tomorrow  will schedule for tomorrow  Hb at goal  Monitor Scr, lytes, UOP

## 2020-04-05 NOTE — PROGRESS NOTE ADULT - ASSESSMENT
Acute Hypoxic respiratory failure related to Intentional Narcotic OD   Acute renal failure, now on HD, Bailey removed, Bladder scan q 12 hrs   ICU Delirium     Neuro - Currently not following commands, not combative  Try to void sedatives  Started on Seroquel Await Psych Evaluation for Suicide attempt. Constant observation .    CV - Off pressors. Hemodynamically stable, Sidell, Bailey d/c'd    Pulm -  Extubated currently on 2L NC with O2 sat > 90%   NPO with TFs nepro increasing slowly to goal  Mobilize as tolerated     GI -  Continue TFs nepro, advance rate to goal    Renal - HD done yesterday 4/4, renal team following    Heme -  Pharmacologic DVT PPx  in addition to SCD's    ID:  ABX d/c'd        E/M TIME SPENT:   38 minutes of noncontinuous time spent   Evaluating/treating patient, reviewing data/labs/imaging.

## 2020-04-05 NOTE — PROGRESS NOTE ADULT - ASSESSMENT
Patient with COPD, s/p vent, currently with SUSAN.  S/P "unresponsive" with residual element of encephalopathy  Respiratory status stable on current nasal O2    Plan:  1.Continue bronchodilators  2.Per renal  3.Titrate O2  4.No longer requires ICU  5.DNR status  Prognosis guarded.

## 2020-04-05 NOTE — PROGRESS NOTE ADULT - SUBJECTIVE AND OBJECTIVE BOX
[facility] DIVISION OF KIDNEY DISEASES AND HYPERTENSION -- FOLLOW UP NOTE  --------------------------------------------------------------------------------  Chief Complaint: SUSAN/ ongoing hemodialysis requirement    24 hour events/subjective:  s/p HD yesterday    PAST HISTORY  --------------------------------------------------------------------------------  No significant changes to PMH, PSH, FHx, SHx, unless otherwise noted    ALLERGIES & MEDICATIONS  --------------------------------------------------------------------------------  Allergies    No Known Allergies    Intolerances      Standing Inpatient Medications  ALBUTerol    90 MICROgram(s) HFA Inhaler 2 Puff(s) Inhalation every 6 hours  dextrose 5%. 1000 milliLiter(s) IV Continuous <Continuous>  dextrose 50% Injectable 12.5 Gram(s) IV Push once  dextrose 50% Injectable 25 Gram(s) IV Push once  dextrose 50% Injectable 25 Gram(s) IV Push once  heparin  Injectable 5000 Unit(s) SubCutaneous every 8 hours  ipratropium 17 MICROgram(s) HFA Inhaler 1 Puff(s) Inhalation every 6 hours  levETIRAcetam  IVPB 500 milliGRAM(s) IV Intermittent every 12 hours  levothyroxine Injectable 50 MICROGram(s) IV Push at bedtime  midodrine 10 milliGRAM(s) Oral every 8 hours  pantoprazole  Injectable 40 milliGRAM(s) IV Push daily  QUEtiapine 25 milliGRAM(s) Oral daily    PRN Inpatient Medications  ALBUTerol    90 MICROgram(s) HFA Inhaler 1 Puff(s) Inhalation every 4 hours PRN  dextrose 40% Gel 15 Gram(s) Oral once PRN  glucagon  Injectable 1 milliGRAM(s) IntraMuscular once PRN      REVIEW OF SYSTEMS  --------------------------------------------------------------------------------  Gen: No weight changes, fatigue, fevers/chills, weakness  Skin: No rashes  Head/Eyes/Ears/Mouth: No headache  Respiratory: No dyspnea, cough,  CV: No chest pain, orthopnea  GI: No abdominal pain, diarrhea, constipation, nausea, vomiting,  MSK: No joint pain  Neuro: No dizziness/lightheadedness, weakness  Heme: No bleeding  Psych: No significant nervousness, anxiety, stress, depression    All other systems were reviewed and are negative, except as noted.    VITALS/PHYSICAL EXAM  --------------------------------------------------------------------------------  T(C): 36.9 (04-05-20 @ 05:00), Max: 37.4 (04-04-20 @ 12:00)  HR: 87 (04-05-20 @ 06:00) (87 - 988)  BP: 105/48 (04-05-20 @ 06:00) (105/48 - 148/55)  RR: 16 (04-05-20 @ 06:00) (14 - 17)  SpO2: 97% (04-05-20 @ 06:00) (92% - 97%)  Wt(kg): --        04-04-20 @ 07:01  -  04-05-20 @ 07:00  --------------------------------------------------------  IN: 440 mL / OUT: 1955 mL / NET: -1515 mL    04-05-20 @ 07:01  -  04-05-20 @ 11:42  --------------------------------------------------------  IN: 90 mL / OUT: 0 mL / NET: 90 mL      Physical Exam:  	Gen: NAD, well-appearing  	HEENT: PERRL, supple neck,  	Pulm: CTA B/L  	CV: RRR, S1S2; no rub  	Abd: +BS, soft, nontender  	UE: Warm, intact strength; no asterixis  	LE: Warm, + edema  	Neuro: No focal deficits  	Psych: Normal affect and mood  	Skin: Warm, without rashes  	Vascular access:    LABS/STUDIES  --------------------------------------------------------------------------------              10.7   10.21 >-----------<  124      [04-05-20 @ 03:56]              32.0     136  |  93  |  41.0  ----------------------------<  55      [04-05-20 @ 03:56]  4.3   |  19.0  |  4.34        Ca     8.6     [04-05-20 @ 03:56]      Mg     2.4     [04-05-20 @ 03:56]    TPro  5.2  /  Alb  2.7  /  TBili  0.2  /  DBili  0.1  /  AST  61  /  ALT  410  /  AlkPhos  89  [04-04-20 @ 03:57]    PT/INR: PT 14.3 , INR 1.26       [04-04-20 @ 03:57]  PTT: 27.7       [04-04-20 @ 03:57]      Ferritin 3576      [03-30-20 @ 18:51]  HbA1c 5.7      [04-03-20 @ 03:44]  TSH 1.97      [04-04-20 @ 03:57]  Lipid: chol 106, , HDL 31, LDL 27      [04-01-20 @ 02:49]    HBsAb Nonreact      [04-01-20 @ 07:33]  HBsAg Nonreact      [04-01-20 @ 07:33]  HCV 0.18, Nonreact      [04-01-20 @ 07:33]  HIV Nonreact      [04-01-20 @ 02:49]

## 2020-04-06 DIAGNOSIS — N17.9 ACUTE KIDNEY FAILURE, UNSPECIFIED: ICD-10-CM

## 2020-04-06 DIAGNOSIS — J96.00 ACUTE RESPIRATORY FAILURE, UNSPECIFIED WHETHER WITH HYPOXIA OR HYPERCAPNIA: ICD-10-CM

## 2020-04-06 DIAGNOSIS — Z51.5 ENCOUNTER FOR PALLIATIVE CARE: ICD-10-CM

## 2020-04-06 DIAGNOSIS — R53.81 OTHER MALAISE: ICD-10-CM

## 2020-04-06 LAB
ANION GAP SERPL CALC-SCNC: 23 MMOL/L — HIGH (ref 5–17)
BUN SERPL-MCNC: 52 MG/DL — HIGH (ref 8–20)
CALCIUM SERPL-MCNC: 9.2 MG/DL — SIGNIFICANT CHANGE UP (ref 8.6–10.2)
CHLORIDE SERPL-SCNC: 97 MMOL/L — LOW (ref 98–107)
CO2 SERPL-SCNC: 19 MMOL/L — LOW (ref 22–29)
CREAT SERPL-MCNC: 5.3 MG/DL — HIGH (ref 0.5–1.3)
CULTURE RESULTS: SIGNIFICANT CHANGE UP
GLUCOSE BLDC GLUCOMTR-MCNC: 84 MG/DL — SIGNIFICANT CHANGE UP (ref 70–99)
GLUCOSE BLDC GLUCOMTR-MCNC: 94 MG/DL — SIGNIFICANT CHANGE UP (ref 70–99)
GLUCOSE BLDC GLUCOMTR-MCNC: 94 MG/DL — SIGNIFICANT CHANGE UP (ref 70–99)
GLUCOSE BLDC GLUCOMTR-MCNC: 96 MG/DL — SIGNIFICANT CHANGE UP (ref 70–99)
GLUCOSE SERPL-MCNC: 103 MG/DL — HIGH (ref 70–99)
HCT VFR BLD CALC: 32.1 % — LOW (ref 34.5–45)
HGB BLD-MCNC: 11 G/DL — LOW (ref 11.5–15.5)
MAGNESIUM SERPL-MCNC: 2.5 MG/DL — SIGNIFICANT CHANGE UP (ref 1.6–2.6)
MCHC RBC-ENTMCNC: 31.7 PG — SIGNIFICANT CHANGE UP (ref 27–34)
MCHC RBC-ENTMCNC: 34.3 GM/DL — SIGNIFICANT CHANGE UP (ref 32–36)
MCV RBC AUTO: 92.5 FL — SIGNIFICANT CHANGE UP (ref 80–100)
PLATELET # BLD AUTO: 142 K/UL — LOW (ref 150–400)
POTASSIUM SERPL-MCNC: 4 MMOL/L — SIGNIFICANT CHANGE UP (ref 3.5–5.3)
POTASSIUM SERPL-SCNC: 4 MMOL/L — SIGNIFICANT CHANGE UP (ref 3.5–5.3)
RBC # BLD: 3.47 M/UL — LOW (ref 3.8–5.2)
RBC # FLD: 14.4 % — SIGNIFICANT CHANGE UP (ref 10.3–14.5)
SODIUM SERPL-SCNC: 139 MMOL/L — SIGNIFICANT CHANGE UP (ref 135–145)
SPECIMEN SOURCE: SIGNIFICANT CHANGE UP
WBC # BLD: 11.18 K/UL — HIGH (ref 3.8–10.5)
WBC # FLD AUTO: 11.18 K/UL — HIGH (ref 3.8–10.5)

## 2020-04-06 PROCEDURE — 99358 PROLONG SERVICE W/O CONTACT: CPT

## 2020-04-06 PROCEDURE — 90937 HEMODIALYSIS REPEATED EVAL: CPT

## 2020-04-06 PROCEDURE — 99221 1ST HOSP IP/OBS SF/LOW 40: CPT

## 2020-04-06 PROCEDURE — 99232 SBSQ HOSP IP/OBS MODERATE 35: CPT

## 2020-04-06 PROCEDURE — 99291 CRITICAL CARE FIRST HOUR: CPT

## 2020-04-06 PROCEDURE — 99497 ADVNCD CARE PLAN 30 MIN: CPT | Mod: 25

## 2020-04-06 RX ORDER — POTASSIUM CHLORIDE 20 MEQ
10 PACKET (EA) ORAL ONCE
Refills: 0 | Status: COMPLETED | OUTPATIENT
Start: 2020-04-06 | End: 2020-04-06

## 2020-04-06 RX ADMIN — HEPARIN SODIUM 5000 UNIT(S): 5000 INJECTION INTRAVENOUS; SUBCUTANEOUS at 06:36

## 2020-04-06 RX ADMIN — PANTOPRAZOLE SODIUM 40 MILLIGRAM(S): 20 TABLET, DELAYED RELEASE ORAL at 12:15

## 2020-04-06 RX ADMIN — LEVETIRACETAM 420 MILLIGRAM(S): 250 TABLET, FILM COATED ORAL at 06:37

## 2020-04-06 RX ADMIN — Medication 50 MICROGRAM(S): at 23:26

## 2020-04-06 RX ADMIN — HEPARIN SODIUM 5000 UNIT(S): 5000 INJECTION INTRAVENOUS; SUBCUTANEOUS at 22:26

## 2020-04-06 RX ADMIN — HEPARIN SODIUM 5000 UNIT(S): 5000 INJECTION INTRAVENOUS; SUBCUTANEOUS at 13:21

## 2020-04-06 RX ADMIN — MIDODRINE HYDROCHLORIDE 10 MILLIGRAM(S): 2.5 TABLET ORAL at 01:02

## 2020-04-06 RX ADMIN — QUETIAPINE FUMARATE 25 MILLIGRAM(S): 200 TABLET, FILM COATED ORAL at 12:15

## 2020-04-06 RX ADMIN — MIDODRINE HYDROCHLORIDE 10 MILLIGRAM(S): 2.5 TABLET ORAL at 13:21

## 2020-04-06 RX ADMIN — Medication 100 MILLIEQUIVALENT(S): at 06:43

## 2020-04-06 RX ADMIN — LEVETIRACETAM 420 MILLIGRAM(S): 250 TABLET, FILM COATED ORAL at 18:26

## 2020-04-06 NOTE — PROGRESS NOTE ADULT - ASSESSMENT
delirium multiple etiologies- hypoactive state  suicide attempt by fentanyl / narcotic overdose  I have nothing to add at this point Please recall should condition improve
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
## 2020-04-06 NOTE — CONSULT NOTE ADULT - PROBLEM SELECTOR RECOMMENDATION 4
-Met with patient, she opens eyes to her name, but unable to follow commands or respond verbally to any questions  - Patient was extubated, tolerating o2 nasal cannula at this time, with o2 saturations > 90%. NGT to feeds. Nephrology following for acute tubular necrosis, now on HD  - Spoke with RN caring for patient, no acute needs/unmanaged symptoms  - Spoke to SAMMY Boston of ICU caring for patient, she is currently trying to contact Brittaney Marinelli from Ethics in regards to question if MOLST form (completed by the patient's daughter) is valid.  - Will attempt to reach Brittaney Marinelli as well to further discuss  - Will continue to support and monitor    Will update this note after I speak with Brittaney Marinelli and Family -Met with patient, she opens eyes to her name, but unable to follow commands or respond verbally to any questions  - Patient was extubated, tolerating o2 nasal cannula at this time, with o2 saturations > 90%. NGT to feeds. Nephrology following for acute tubular necrosis, now on HD  - Spoke with RN caring for patient, no acute needs/unmanaged symptoms  - Spoke to SAMMY Boston of ICU caring for patient, she is currently trying to contact Brittaney Monte from Ethics in regards to question if MOLST form (completed by the patient's daughter) is valid.  - Confirmed with Brittaney Monte today patient's daughter Mayra  and son Omar are allowed to make decisions on patient's behalf - as she verified with her director of ethics. Brittaney also discussed this with SAMMY Boston informing her that the adult children can make decisions on their mother's behalf.  -Call placed to Mayra and Omar to discuss MOLST Form. Both adult children confirm their mother has always wanted to be DNR DNI in the event of cardio/pulmonary arrest.(Both Adult children informed me the patient had a will made in which stated DNR/DNI in the past)  If patient continues to decompensate and is actively dying, both children wish for comfort measures to be exercised.  Until then, they wish to continue to medically optimize and continue current treatment plan.   -MOLST completed with second witness Emily SLOANW of palliative care. Updated SAMMY Boston.   -Provided emotional support to Omar and Mayra during this difficult time.  -Will update this note after I speak with Brittaney Marinelli and Family    Total time spent 35 minutes  20 minutes in ACP and GOC with family on phone ~315pm-335pm    Thank you for the opportunity to assist with the care of this patient.   [facility] Consult [facility] [phone].

## 2020-04-06 NOTE — CONSULT NOTE ADULT - ASSESSMENT
Conclusion:  Both [place] law, [place] and current ethical thinking support the right of her daughter and/son to make medical decisions that include DNR as well as secondary treatments decisions. However, it is premature to withhold LST’s in this case. Ethics will remain available to assist the son, daughter and health team in future shared decisions.  There is a strong possibility of cardiac arrest and a grim outcome of resuscitative measures related to cardiac arrest. Therefore, keeping in mind the ethical principles of beneficence and non-maleficence, a DNR/DNI code status appears appropriate medical management. Further, not enacting a DNR and turning to another loved one to act as surrogate, would necessitate adding additional trauma as well as caregiver burden to the daughter by unduly speculating suspicion. Suggest family meeting with both siblings to clarify advanced directives.    Case discussed with Medical Ethicist Moriah Bowers; [facility] by Dr. Brittaney Monte, [facility]  More than 50% of the time of this consultation was spent in coordination of Care of Patient

## 2020-04-06 NOTE — CONSULT NOTE ADULT - PROBLEM SELECTOR RECOMMENDATION 2
Bailey removed, Currently on HD with bladder scans q 12 hours  Nephrology following, continue with recommendations

## 2020-04-06 NOTE — GOALS OF CARE CONVERSATION - ADVANCED CARE PLANNING - CONVERSATION DETAILS
Sw and palliative NP Geneva Lorenzo contacted dgt and son to discuss end of life wishes for goals of acre after being advised by Ethics Brittaney Monte that family can make decisions. Dgt and son are devastated at circumstances which brought patient to hospital and feel they will be haunted their whole lives with suicide note. Support offered in coping with circumstances . Dgt and son agree that they would not want her life sustained on life support or by CPR and that patient had discussed these wishes years ago as well had Living Will. JEY completed.

## 2020-04-06 NOTE — CONSULT NOTE ADULT - CONSULT REASON
To assist in the ethical dilemma posed by patient with presumptive suicide and appropriate surrogates for clinical decision making and advanced directives

## 2020-04-06 NOTE — PROGRESS NOTE ADULT - SUBJECTIVE AND OBJECTIVE BOX
Patient was seen and evaluated on dialysis.   No c/o CP SOB NV  no F/C  no swelling    T(C): 37 (04-07-20 @ 00:00), Max: 37.4 (04-06-20 @ 07:57)  HR: 75 (04-07-20 @ 04:00) (75 - 88)  BP: 142/71 (04-07-20 @ 00:00) (130/84 - 146/72)  Wt(kg): -- TBD,   PE ;  NAD, Pale,   lungs - CTA  CV gr 1 murmur,  No gallop or rub  Abd : soft, NT BS +, No masses  Ext- No edema  Neuro : Grossly intact, moving extremities                         11.0   11.18 )-----------( 142      ( 06 Apr 2020 02:05 )             32.1     04-06    139  |  97<L>  |  52.0<H>  ----------------------------<  103<H>  4.0   |  19.0<L>  |  5.30<H>    Ca    9.2      06 Apr 2020 02:05  Mg     2.5     04-06    MEDICATIONS  (STANDING):  ALBUTerol    90 MICROgram(s) HFA Inhaler  ALBUTerol    90 MICROgram(s) HFA Inhaler PRN  dextrose 40% Gel PRN  dextrose 5%.  dextrose 50% Injectable  dextrose 50% Injectable  dextrose 50% Injectable  glucagon  Injectable PRN  heparin  Injectable  insulin lispro (HumaLOG) corrective regimen sliding scale  ipratropium 17 MICROgram(s) HFA Inhaler  levETIRAcetam  IVPB  levothyroxine Injectable  midodrine  pantoprazole  Injectable  QUEtiapine    Patient stable  Julia HD easily- Short Treatment,   Continue Current management,

## 2020-04-06 NOTE — PROGRESS NOTE ADULT - ASSESSMENT
HD done for 1.45hrs secondary to clotting in the venous chamber.     Removed 1.1 liters of fluid.     Post access care provided.     Report  given to primary RN

## 2020-04-06 NOTE — CONSULT NOTE ADULT - ASSESSMENT
This is a 74 year old female PMHx COPD (current smoker) lung nodule (s/p wedge resection) hypothyroid, admitted to [facility] with althered mental status. Family found patient unresponsiv. In ED patient arousable, hypotensive, with multiple fentanyl patches on her which were removed by staff in ED. Narcan given, initially responded to but soon after had a non sustained GTCS for ~ 30sec which responded to 2 mg Ativan. Intubated for airway protection. No reports of chest pain, SOB, Nausea, vomiting, diarrhea, fever, chills. Patient tested for COVID19, negative. Family requesting patient be DNR DNI.  In patient's suicide note it reflects that she "was tired of trying to get everyone to love me". PA of ICU Ludy called Brittaney Marinelli for ethics consult regarding code status/advance directive decision making for the patient. This is a Palliative Care Consult

## 2020-04-06 NOTE — GOALS OF CARE CONVERSATION - ADVANCED CARE PLANNING - CONVERSATION DETAILS
Palliative care encounter.    Recommendation:     -Met with patient, she opens eyes to her name, but unable to follow commands or respond verbally to any questions  - Patient was extubated, tolerating o2 nasal cannula at this time, with o2 saturations > 90%. NGT to feeds. Nephrology following for acute tubular necrosis, now on HD  - Spoke with RN caring for patient, no acute needs/unmanaged symptoms  - Spoke to SAMMY Boston of ICU caring for patient, she is currently trying to contact Brittaney Monte from Ethics in regards to question if MOLST form (completed by the patient's daughter) is valid.  - Confirmed with Brittaney Monte today patient's daughter Mayra  and son Omar are allowed to make decisions on patient's behalf - as she verified with her director of ethics. Brittaney also discussed this with SAMMY Boston informing her that the adult children can make decisions on their mother's behalf.  -Call placed to Mayra and Omar to discuss MOLST Form. Both adult children confirm their mother has always wanted to be DNR DNI in the event of cardio/pulmonary arrest.(Both Adult children informed me the patient had a will made in which stated DNR/DNI in the past)  If patient continues to decompensate and is actively dying, both children wish for comfort measures to be exercised.  Until then, they wish to continue to medically optimize and continue current treatment plan.   -MOLST completed with second witness Emily SLOANW of palliative care. Updated SAMMY Boston.   -Provided emotional support to Omar and Mayra during this difficult time.  -Will update this note after I speak with Brittaney Marinelli and Family    20 minutes in ACP and GOC with family on phone ~315pm-335pm    Thank you for the opportunity to assist with the care of this patient.   [facility] Consult [facility] [phone].

## 2020-04-06 NOTE — PROGRESS NOTE ADULT - SUBJECTIVE AND OBJECTIVE BOX
HPI:  74F current smoker w/ PMHx COPD, lung nodule s/p wedge resection, hypothyroid was BIBEMS with altered mental status. She was found by her family unresponsive and w/ agonal respirations around 6PM on . She was arousable but hypotensive on initial eval and was found to have multiple fentanyl patches on her which were removed by the ED nursing staff. Narcan was given which she initially responded to but soon after had a non sustained GTCS for ~ 30sec which responded to 2 mg Ativan. Intubated for airway protection. LDH, CRP and ferritin were elevated and pt is currently being r/o COVID infection. CXR w/ L retrocardiac opacity. RVP negative (31 Mar 2020 01:20)    PAST MEDICAL & SURGICAL HISTORY:  Lung nodule  COPD (chronic obstructive pulmonary disease)  Hyperlipidemia  Hypothyroid  Chronic knee pain  S/P hysterectomy  S/P  section    FAMILY HISTORY:  No pertinent family history in first degree relatives  Patient is a 74y old  Female who presents with a chief complaint of Resp failure and seizure (2020 11:47)    Subjective: Patient lying in bed unresponsive to questioning. +Withdraws from painful stimuli. Unable to obtain full ROS.     Review of Systems:   Unable to obtain due to: Altered mental status, patient responds to noxious or painful stimuli.    ICU Vital Signs Last 24 Hrs  T(C): 36.3 (2020 23:51), Max: 36.9 (2020 05:00)  T(F): 97.3 (2020 23:51), Max: 98.4 (2020 05:00)  HR: 81 (2020 23:51) (79 - 103)  BP: 121/60 (2020 23:51) (105/48 - 148/55)  BP(mean): 74 (2020 20:00) (63 - 89)  RR: 12 (2020 23:51) (12 - 16)  SpO2: 95% (2020 23:51) (93% - 97%)      I&O's Detail    2020 07:01  -  2020 07:00  --------------------------------------------------------  IN:    Free Water: 60 mL    Jevity: 180 mL    Solution: 200 mL  Total IN: 440 mL    OUT:    Indwelling Catheter - Urethral: 255 mL    Other: 1700 mL  Total OUT: 1955 mL    Total NET: -1515 mL      2020 07:01  -  2020 01:25  --------------------------------------------------------  IN:    Jevity: 150 mL    Nepro: 120 mL  Total IN: 270 mL    OUT:    Indwelling Catheter - Urethral: 350 mL  Total OUT: 350 mL    Total NET: -80 mL      LABS                        10.7   10.21 )-----------( 124      ( 2020 03:56 )             32.0     04-    136  |  93<L>  |  41.0<H>  ----------------------------<  55<L>  4.3   |  19.0<L>  |  4.34<H>    Ca    8.6      2020 03:56  Mg     2.4     04-05    TPro  5.2<L>  /  Alb  2.7<L>  /  TBili  0.2<L>  /  DBili  0.1  /  AST  61<H>  /  ALT  410<H>  /  AlkPhos  89  04-04    LIVER FUNCTIONS - ( 2020 03:57 )  Alb: 2.7 g/dL / Pro: 5.2 g/dL / ALK PHOS: 89 U/L / ALT: 410 U/L / AST: 61 U/L / GGT: x           PT/INR - ( 2020 03:57 )   PT: 14.3 sec;   INR: 1.26 ratio       PTT - ( 2020 03:57 )  PTT:27.7 sec    POCT Blood Glucose.: 102 mg/dL (20 @ 17:37)  POCT Blood Glucose.: 86 mg/dL (20 @ 06:49)      MEDICATIONS  (STANDING):  ALBUTerol    90 MICROgram(s) HFA Inhaler 2 Puff(s) Inhalation every 6 hours  heparin  Injectable 5000 Unit(s) SubCutaneous every 8 hours  insulin lispro (HumaLOG) corrective regimen sliding scale   SubCutaneous every 6 hours  ipratropium 17 MICROgram(s) HFA Inhaler 1 Puff(s) Inhalation every 6 hours  levETIRAcetam  IVPB 500 milliGRAM(s) IV Intermittent every 12 hours  levothyroxine Injectable 50 MICROGram(s) IV Push at bedtime  midodrine 10 milliGRAM(s) Oral every 8 hours  pantoprazole  Injectable 40 milliGRAM(s) IV Push daily  QUEtiapine 25 milliGRAM(s) Oral daily    MEDICATIONS  (PRN):  ALBUTerol    90 MICROgram(s) HFA Inhaler 1 Puff(s) Inhalation every 4 hours PRN Bronchospasm  dextrose 40% Gel 15 Gram(s) Oral once PRN Blood Glucose LESS THAN 70 milliGRAM(s)/deciliter  glucagon  Injectable 1 milliGRAM(s) IntraMuscular once PRN Glucose LESS THAN 70 milligrams/deciliter    Allergies:  No Known Allergies    Physical Exam:   General:  well nourished, no acute distress, Vital signs remain stable on monitor. SpO2 stable on 4L O2 via NC.   Neurology:  somnolent minimally responsive  Respiratory:  clear to auscultation bilaterally  CV:  regular rate and rhythm, normal S1 S2  Abdomen:  soft, nontender, nondistended, positive bowel sounds  Extremities:  warm, well perfused, 1+ edema b/l UEs, +DP pulses palpable    Code Status: DNR    Critical care time spent: 30 minutes (reviewing chart including medication, labs and imaging results, discussions with interdisciplinary team, discussing goals of care/advanced directives, counseling patient and/or family, non-inclusive of procedures)    Case including assessment/plan of care discussed with attendings on AM rounds.

## 2020-04-06 NOTE — CONSULT NOTE ADULT - PROBLEM SELECTOR RECOMMENDATION 9
Extubated, tolerating o2 nasal cannula , continue with supplemental o2  Ethics called in question to MOLST form and decision making

## 2020-04-06 NOTE — CHART NOTE - NSCHARTNOTEFT_GEN_A_CORE
Source: Patient [ ]  Family [ ]   other [x ] EMR     Enteral /Parenteral Nutrition: Diet, NPO with Tube Feed:   Tube Feeding Modality: Nasogastric  Nepro  Total Volume for 24 Hours (mL): 480  Continuous  Starting Tube Feed Rate {mL per Hour}: 20  Until Goal Tube Feed Rate (mL per Hour): 20  Tube Feed Duration (in Hours): 24  Tube Feed Start Time: 13:30 (04-04-20 @ 13:25)      Current Weight:   4/6: 87.8kg  3/31: 81.6kg      % Weight Change: 15# wt change over one week, unsure of accuracy of wt's; however B/L upper extremity generalized edema noted.     Pertinent Medications: MEDICATIONS  (STANDING):  ALBUTerol    90 MICROgram(s) HFA Inhaler 2 Puff(s) Inhalation every 6 hours  dextrose 5%. 1000 milliLiter(s) (50 mL/Hr) IV Continuous <Continuous>  dextrose 50% Injectable 12.5 Gram(s) IV Push once  dextrose 50% Injectable 25 Gram(s) IV Push once  dextrose 50% Injectable 25 Gram(s) IV Push once  heparin  Injectable 5000 Unit(s) SubCutaneous every 8 hours  insulin lispro (HumaLOG) corrective regimen sliding scale   SubCutaneous every 6 hours  ipratropium 17 MICROgram(s) HFA Inhaler 1 Puff(s) Inhalation every 6 hours  levETIRAcetam  IVPB 500 milliGRAM(s) IV Intermittent every 12 hours  levothyroxine Injectable 50 MICROGram(s) IV Push at bedtime  midodrine 10 milliGRAM(s) Oral every 8 hours  pantoprazole  Injectable 40 milliGRAM(s) IV Push daily  QUEtiapine 25 milliGRAM(s) Oral daily    MEDICATIONS  (PRN):  ALBUTerol    90 MICROgram(s) HFA Inhaler 1 Puff(s) Inhalation every 4 hours PRN Bronchospasm  dextrose 40% Gel 15 Gram(s) Oral once PRN Blood Glucose LESS THAN 70 milliGRAM(s)/deciliter  glucagon  Injectable 1 milliGRAM(s) IntraMuscular once PRN Glucose LESS THAN 70 milligrams/deciliter    Pertinent Labs: CBC Full  -  ( 06 Apr 2020 02:05 )  WBC Count : 11.18 K/uL  RBC Count : 3.47 M/uL  Hemoglobin : 11.0 g/dL  Hematocrit : 32.1 %  Platelet Count - Automated : 142 K/uL  Mean Cell Volume : 92.5 fl  Mean Cell Hemoglobin : 31.7 pg  Mean Cell Hemoglobin Concentration : 34.3 gm/dL  Auto Neutrophil # : x  Auto Lymphocyte # : x  Auto Monocyte # : x  Auto Eosinophil # : x  Auto Basophil # : x  Auto Neutrophil % : x  Auto Lymphocyte % : x  Auto Monocyte % : x  Auto Eosinophil % : x  Auto Basophil % : x        Skin: Ecchymotic areas noted       Estimated Needs:   [x ] no change since previous assessment  [ ] recalculated:     Current Nutrition Diagnosis:  Pt remains at high nutrition risk secondary to moderate protein calorie malnutrition related to inability to meet sufficient energy requirements in setting of Acute Hypoxic respiratory failure related to Intentional Narcotic Overdose, now with renal failure requiring HD (pt now extubated, remains obtunded) as evidenced by meeting less then 75% estimated energy requirements > 5 days and +fluid accumulation. Pt receiving enteral feeds of Nepro @ 20ml/hr (x20 hours) 400ml/day; 720kcal, 32gm protein, inadequate to meet pt's estimated nutrition needs at this time. Recommendations below:     Recommendations:   1. Rx: Nephro-yovany and folic acid daily   2. Check wt daily to monitor trends  3. Monitor Renal labs   4. Increase Nepro to 50ml/hr (x 20 hours) 1000ml/day; 1800kcal, 81gm protein to better meet pt's estimated nutrition needs.     Monitoring and Evaluation:   [ ] PO intake [x ] Tolerance to diet prescription [X] Weights  [X] Follow up per protocol [X] Labs:

## 2020-04-06 NOTE — PROGRESS NOTE ADULT - ASSESSMENT
Assess    Post arrest encephalopathy  ARF  DNR  HCP's wishes being questioned    Plan    Per CTS  BD Rx  Not ICU requiring  Inhalers  Palliative/ ? ethics re- HD Assess    Post arrest encephalopathy  ARF  DNR  HCP's wishes being questioned    Plan    Per CTS pending medicine  BD Rx  Renal FU  Not ICU requiring  Palliative/ ? ethics re- HD  Midodrine as needed

## 2020-04-06 NOTE — CONSULT NOTE ADULT - SUBJECTIVE AND OBJECTIVE BOX
HPI: This is a 74 year old female PMHx COPD (current smoker) lung nodule (s/p wedge resection) hypothyroid, admitted to [facility] with althered mental status. Family found patient unresponsiv. In ED patient arousable, hypotensive, with multiple fentanyl patches on her which were removed by staff in ED. Narcan given, initially responded to but soon after had a non sustained GTCS for ~ 30sec which responded to 2 mg Ativan. Intubated for airway protection. No reports of chest pain, SOB, Nausea, vomiting, diarrhea, fever, chills. Patient tested for COVID19, negative. Family requesting patient be DNR DNI.  In patient's suicide note it reflects that she "was tired of trying to get everyone to love me". PA of ICU Ludy called Brittaney Monte for ethics consult regarding code status/advance directive decision making for the patient. Patient accused familu of not loving her. Ethics consult triggered to ascertain appropriate surrogate decision maker for advanced directives related to presumptive suicide Clinical summary: Patient is a 74 year old female PMHx COPD (current smoker) lung nodule (s/p wedge resection for adenocarcinoma Stage I in 2018), oxygen dependent at home, hypothyroid, admitted to [facility] with altered mental status. Family found patient unresponsive in apartment that she resides in  daughter’s home. Hospitalization precipitated after suicide attempt and argument with daughter to buy cigarettes during COVID pandemic. Patient utilized  ’s fentanyl from .    In ED patient arousable, hypotensive, with multiple fentanyl patches on her which were removed by staff in ED. Narcan given, initially responded to but soon after had a non sustained GTCS for ~ 30sec which responded to 2 mg Ativan. Intubated for airway protection. No reports of chest pain, SOB, Nausea, vomiting, diarrhea, fever, chills. Patient tested for COVID19, negative. Family requesting patient be DNR DNI.  In patient's suicide note it reflects that she "was tired of trying to get everyone to love me". PA of ICU Ludy Chaves called Brittaney Monte for ethics consult regarding code status/advance directive decision making for the patient. Patient accused family of not loving her. Patient is presently extubated but with poor neurologic status. Daughter signed MOLST indicating DNR. Patient with [facility] had dialysis this am. Patient was made comfort measures and the physician team voiced concern that daughter was named as a relative who didn’t love her in the suicide note. Ethics consult triggered to ascertain appropriate surrogate decision maker for advanced directives related to presumptive suicide  Prognosis Estimate (survival in days, wks, mos, yrs): Months to Years with potentially persistent neurologic compromise					  Patient Decision-Making Capacity:  Has capacity    Lacks capacity due to toxic metabolic encephalopathy		  Patient Aware of:  Diagnosis:   Yes    No   Unknown    Prognosis:   Yes    No   Unknown       Name of medical decision-maker should patient lack capacity: Mayra Limon Relationship: Daughter and Son		     Role:   Health Care Agent      Legal Surrogate   Contact #(s):[phone]				   Other ‘stakeholders’: Son Marlon Babb    Other Decision-Maker (i.e., HCA or Surrogate) Aware of:  Diagnosis: Yes   No      Prognosis:   Yes     No   Evidence of Patient’s Preference of Life-Sustaining Treatment (Written or Oral): None			               	   Resuscitation status:   DNR:  Yes   No      DNI:  Yes   No  (DNR status clarified by this consult)    Discussion   2020- 9:35- 10:15 - Ethics spoke with ICU PA Ludy Chaves who expressed the team’s concern about comfort measures. The team’s ethical question centered on whether the children was appropriate decision makers and the validity of the MOLST. Spoke with Palliative Care NP Geneva Lorenzo confirmed that today daughter Mayra  and son Omar are allowed to make decisions on patient's behalf  and verified with Director of  Medical Ethics Elissa.  Bioethics analysis: At the heart of this difficult ethical dilemma is the analysis of four principles; autonomy , non-maleficence, beneficence and social justice. The question in this case requires an analysis first of the clinician’s duty to beneficence and non maleficence for a critically ill patient who presently is neurologically  and renally compromised by a suicide attempt.  	The clinicians’ are commended for ther virtue in entertaining an appropriate surrogate decision maker for the patient’s advanced care plan. Presently, the patient is receiving hemodialysis for acute kidney injury and her children have affirmed DNR/DNI should she have a cardiac arrest. Beneficence and nonmaleficence suggests the current treatment the patient is receiving including hemodialysis. This emergent treatment by its nature does not require surrogate decision making. Current treatment is implied consent on admission and was precipitated by  her arrival for fentanyl overdose.   	The moral distress that arises in this case is secondary to the physician’s duty to beneficence to clarify who represents this patient’s “best interest”.  Given this patient’s presentation it is reasonable for any ethically prudent individual to surmise the patient’s distrust of her children and underlying deppression. However, it is beyond the scope of practice of a medical clinician to surmise whether  Mrs. Babb would not desire her children as decision makers. Although the accusation is that they did not “love her” she signed her suicide note “with love mom”. Social justice suggests that she and her family be treated fairly and without undue bias or prejudice..     Thus, the ethical consult will entertain whether it is ethically reasonable for her childrento clarify advanced directives at present time. At one pole is the bioethical principle of autonomy- here represented for the patient by the Family Health Care Decision Act hierarchal surrogate; her children. In tension at the other pole is the physician’s duty of beneficence (do good) in this kind of situation, by emergently sustaining life of the patient after toxic overdose. The bioethical principle of beneficence calls on clinicians to respect Mrs. Babb’s autonomy and to honor and preserve her sense of dignity and personhood, his moral status. (Beneficence aims to promote the best possible health or quality of living or dying: with aggressive interventions when these help prolong life with “good quality”, with comfort care when LST’s are not possible and the aim is to achieve the best “quality of dying.” The clinician’s duty to non-maleficence means avoiding medical interventions whose risk and burden exceeds their benefit (“first do no harm”). As this patient lacks capacity for complex medical decision-making, it is appropriate for protection of her autonomy.    Presumptive suicide or homicide does not change the criteria for considering whether a DNR is appropriate medically or whether life sustaining treatment may be withheld or withdrawn. Popeye et al. (2013) states 1 that it is appropriate to withdraw life support, regardless of the cause of the critical illness or disability, when the following criteria are met: (1) Surrogates request withdrawal of care and the adequacy of surrogates is confirmed, (2) an external reasonability standard is met, (3) passage of appropriate timeto allow certainty regarding the patient's wishes, and (4) psychiatric morbidity should be considered as grounds for withdrawal only in truly treatment-refractory cases. A decision to withdraw or withhold life-sustaining treatment can only be made after determination that the treatment offers the patient no medical benefit because the patient will die imminently, even if the treatment is provided, and the provision of the treatment would violate accepted medical standards. This case does not presently meet the criteria for withdrawal of hemodialysis as her acute kidney injury is potentially treatable.     Her children have the moral agency to act as an  appropriate surrogate under the Critical access hospitalA. Nevertheless, we must keep in mind that a surrogate with a conflict of interest may not make decisions based on either “substituted judgment” or “best interest”. Thus, we should base our decisions on the medicine. In the case of Mrs. Babb has underlying conditions COPD, depression and h/o lung resection  (2018) for adenocarcinoma  that could be expected to cause a cardiac arrest.  Should the patient go into cardiac arrest and be resuscitated, the likelihood of a favorable neurologic outcome is grim. Therefore, keeping in mind the ethical principles of beneficence and non-maleficence, a DNR code status appears appropriate medical management in light of her comorbidities.     Moreover, to order a DNR for this patient does not imply a withdrawal of aggressive interventions for her toxic encephalopathy. These are two separate decisions. A decision to withdraw or withhold life-sustaining treatment can only be made after determination that the treatment offers the patient no medical benefit because the patient will die imminently, even if the treatment is provided, and the provision of the treatment would violate accepted medical standards.    Under the Critical access hospitalA, a decision to withhold LSTs must meet two criteria:  ¥	Criterion 1: Treatment would be an extraordinary burden to the patient        AND: (Select a or b)        a.    The patient has an illness or injury which can be expected to cause death within six months, whether or not treatment is provided; OR         b.    The patient is permanently unconscious.  ¥	Criterion 2:         The patient has an irreversible or incurable condition;                                       AND        The provision of treatment would involve such pain, suffering or other burden that it would reasonably be deemed inhumane or extraordinarily burdensome under the circumstances.      In honoring the patient’s moral status, the issue remains with this patient the ascertainable prognosis of her neurologic, and renal recovery and whether the continuation of hospitalization and/or further escalation of care (is likely to insure a favorable outcome and restoration of her health status. Presently, many medical questions remain to prognosticate a decision which may result in the irreversible response of death. As his condition has not been prognosticated, any decisions to withdraw or withhold life sustaining treatments are premature.

## 2020-04-06 NOTE — CONSULT NOTE ADULT - SUBJECTIVE AND OBJECTIVE BOX
Palliative Care Consult  HPI: This is a 74 year old female PMHx COPD (current smoker) lung nodule (s/p wedge resection) hypothyroid, admitted to [facility] with althered mental status. Family found patient unresponsiv. In ED patient arousable, hypotensive, with multiple fentanyl patches on her which were removed by staff in ED. Narcan given, initially responded to but soon after had a non sustained GTCS for ~ 30sec which responded to 2 mg Ativan. Intubated for airway protection. No reports of chest pain, SOB, Nausea, vomiting, diarrhea, fever, chills. Patient tested for COVID19, negative. Family requesting patient be DNR DNI.  In patient's suicide note it reflects that she "was tired of trying to get everyone to love me". PA of ICU Ludy called Brittaney Marinelli for ethics consult regarding code status/advance directive decision making for the patient. This is a Palliative Care Consult    <HPI:  74F current smoker w/ PMHx COPD, lung nodule s/p wedge resection, hypothyroid was BIBEMS with altered mental status. She was found by her family unresponsive and w/ agonal respirations around 6PM on . She was arousable but hypotensive on initial eval and was found to have multiple fentanyl patches on her which were removed by the ED nursing staff. Narcan was given which she initially responded to but soon after had a non sustained GTCS for ~ 30sec which responded to 2 mg Ativan. Intubated for airway protection. LDH, CRP and ferritin were elevated and pt is currently being r/o COVID infection. CXR w/ L retrocardiac opacity. RVP negative (31 Mar 2020 01:20)> end of copied text    PERTINENT PMH REVIEWED: Yes chart reviewed  PAST MEDICAL & SURGICAL HISTORY:  Lung nodule  COPD (chronic obstructive pulmonary disease)  Hyperlipidemia  Hypothyroid  Chronic knee pain  S/P hysterectomy  S/P  section      SOCIAL HISTORY:                                     Admitted from:  home      Surrogate/HCP/Guardian: Phone#:    FAMILY HISTORY:  No family history related to admission diagnosis    Baseline ADLs (prior to admission):  Independent    Allergies  No Known Allergies    Present Symptoms:   Dyspnea: 0   Nausea/Vomiting: Unable to obtain due to poor mentation   Anxiety:  Unable to obtain due to poor mentation   Depression: Unable to obtain due to poor mentation   Fatigue: Yes   Loss of appetite: Yes     Pain: Appears comfortable            Character-            Duration-            Effect-            Factors-            Frequency-            Location-            Severity-    Review of Systems: Reviewed  Per HPI, all other ROS negative  Difficult to obtain due to poor mentation     MEDICATIONS  (STANDING):  ALBUTerol    90 MICROgram(s) HFA Inhaler 2 Puff(s) Inhalation every 6 hours  dextrose 5%. 1000 milliLiter(s) (50 mL/Hr) IV Continuous <Continuous>  dextrose 50% Injectable 12.5 Gram(s) IV Push once  dextrose 50% Injectable 25 Gram(s) IV Push once  dextrose 50% Injectable 25 Gram(s) IV Push once  heparin  Injectable 5000 Unit(s) SubCutaneous every 8 hours  insulin lispro (HumaLOG) corrective regimen sliding scale   SubCutaneous every 6 hours  ipratropium 17 MICROgram(s) HFA Inhaler 1 Puff(s) Inhalation every 6 hours  levETIRAcetam  IVPB 500 milliGRAM(s) IV Intermittent every 12 hours  levothyroxine Injectable 50 MICROGram(s) IV Push at bedtime  midodrine 10 milliGRAM(s) Oral every 8 hours  pantoprazole  Injectable 40 milliGRAM(s) IV Push daily  QUEtiapine 25 milliGRAM(s) Oral daily    MEDICATIONS  (PRN):  ALBUTerol    90 MICROgram(s) HFA Inhaler 1 Puff(s) Inhalation every 4 hours PRN Bronchospasm  dextrose 40% Gel 15 Gram(s) Oral once PRN Blood Glucose LESS THAN 70 milliGRAM(s)/deciliter  glucagon  Injectable 1 milliGRAM(s) IntraMuscular once PRN Glucose LESS THAN 70 milligrams/deciliter      PHYSICAL EXAM:  Physical Exam - limited physical exam due to COVID - 19 isolation status  No Physical Exam at bedside completed in attempt to limit COVID-19   Reviewed H&P physical exam and most recent Hospitalist Physical Exam   See H&P physical exam and most recent Hospitalist Document physical exam for details    Vital Signs Last 24 Hrs  T(C): 37.4 (2020 07:57), Max: 37.4 (2020 07:57)  T(F): 99.3 (2020 07:57), Max: 99.3 (2020 07:57)  HR: 88 (2020 09:00) (79 - 88)  BP: 131/71 (2020 09:00) (101/53 - 131/71)  BP(mean): 78 (2020 09:00) (73 - 78)  RR: 19 (2020 09:00) (12 - 19)  SpO2: 94% (2020 09:00) (94% - 97%)    General:lethargic     Karnofsky:  %20    HEENT: dry mouth     Lungs: comfortable    CV: normal    GI: incontinent, NGT     : incontinent      MSK: weakness  edema +1 BLE bedbound    Skin: thin frail skin     LABS:                        11.0   11.18 )-----------( 142      ( 2020 02:05 )             32.1     04-06    139  |  97<L>  |  52.0<H>  ----------------------------<  103<H>  4.0   |  19.0<L>  |  5.30<H>    Ca    9.2      2020 02:05  Mg     2.5     04-06          I&O's Summary    2020 07:01  -  2020 07:00  --------------------------------------------------------  IN: 610 mL / OUT: 600 mL / NET: 10 mL    2020 07:01  -  2020 12:22  --------------------------------------------------------  IN: 0 mL / OUT: 0 mL / NET: 0 mL        RADIOLOGY & ADDITIONAL STUDIES:  CXR 20  IMPRESSION: Mild congestive changes. Status post right-sided central venous catheter removal..    CT head 20IMPRESSION:  No acute intracranial hemorrhage or acute territorial infarct.  If symptoms persist, follow-up MRI exam recommended.  Nonspecific incidental asymmetric prominent left superior ophthalmic vein.  Nonspecific linear hyperdensity partially visualized at the level of oral cavity on image 1. Correlate clinically    ADVANCE DIRECTIVES:   Pending Ethics Consult Palliative Care Consult  HPI: This is a 74 year old female PMHx COPD (current smoker) lung nodule (s/p wedge resection) hypothyroid, admitted to [facility] with althered mental status. Family found patient unresponsiv. In ED patient arousable, hypotensive, with multiple fentanyl patches on her which were removed by staff in ED. Narcan given, initially responded to but soon after had a non sustained GTCS for ~ 30sec which responded to 2 mg Ativan. Intubated for airway protection. No reports of chest pain, SOB, Nausea, vomiting, diarrhea, fever, chills. Patient tested for COVID19, negative. Family requesting patient be DNR DNI.  In patient's suicide note it reflects that she "was tired of trying to get everyone to love me". PA of ICU Ludy called Brittaney Marinelli for ethics consult regarding code status/advance directive decision making for the patient. This is a Palliative Care Consult    <HPI:  74F current smoker w/ PMHx COPD, lung nodule s/p wedge resection, hypothyroid was BIBEMS with altered mental status. She was found by her family unresponsive and w/ agonal respirations around 6PM on . She was arousable but hypotensive on initial eval and was found to have multiple fentanyl patches on her which were removed by the ED nursing staff. Narcan was given which she initially responded to but soon after had a non sustained GTCS for ~ 30sec which responded to 2 mg Ativan. Intubated for airway protection. LDH, CRP and ferritin were elevated and pt is currently being r/o COVID infection. CXR w/ L retrocardiac opacity. RVP negative (31 Mar 2020 01:20)> end of copied text    PERTINENT PMH REVIEWED: Yes chart reviewed  PAST MEDICAL & SURGICAL HISTORY:  Lung nodule  COPD (chronic obstructive pulmonary disease)  Hyperlipidemia  Hypothyroid  Chronic knee pain  S/P hysterectomy  S/P  section      SOCIAL HISTORY:                                     Admitted from:  home      Surrogate/HCP/Guardian: Phone#:    FAMILY HISTORY:  No family history related to admission diagnosis    Baseline ADLs (prior to admission):  Independent    Allergies  No Known Allergies    Present Symptoms:   Dyspnea: 0   Nausea/Vomiting: Unable to obtain due to poor mentation   Anxiety:  Unable to obtain due to poor mentation   Depression: Unable to obtain due to poor mentation   Fatigue: Yes   Loss of appetite: Yes     Pain: Appears comfortable            Character-            Duration-            Effect-            Factors-            Frequency-            Location-            Severity-    Review of Systems: Reviewed  Per HPI, all other ROS negative  Difficult to obtain due to poor mentation     MEDICATIONS  (STANDING):  ALBUTerol    90 MICROgram(s) HFA Inhaler 2 Puff(s) Inhalation every 6 hours  dextrose 5%. 1000 milliLiter(s) (50 mL/Hr) IV Continuous <Continuous>  dextrose 50% Injectable 12.5 Gram(s) IV Push once  dextrose 50% Injectable 25 Gram(s) IV Push once  dextrose 50% Injectable 25 Gram(s) IV Push once  heparin  Injectable 5000 Unit(s) SubCutaneous every 8 hours  insulin lispro (HumaLOG) corrective regimen sliding scale   SubCutaneous every 6 hours  ipratropium 17 MICROgram(s) HFA Inhaler 1 Puff(s) Inhalation every 6 hours  levETIRAcetam  IVPB 500 milliGRAM(s) IV Intermittent every 12 hours  levothyroxine Injectable 50 MICROGram(s) IV Push at bedtime  midodrine 10 milliGRAM(s) Oral every 8 hours  pantoprazole  Injectable 40 milliGRAM(s) IV Push daily  QUEtiapine 25 milliGRAM(s) Oral daily    MEDICATIONS  (PRN):  ALBUTerol    90 MICROgram(s) HFA Inhaler 1 Puff(s) Inhalation every 4 hours PRN Bronchospasm  dextrose 40% Gel 15 Gram(s) Oral once PRN Blood Glucose LESS THAN 70 milliGRAM(s)/deciliter  glucagon  Injectable 1 milliGRAM(s) IntraMuscular once PRN Glucose LESS THAN 70 milligrams/deciliter      Vital Signs Last 24 Hrs  T(C): 37.4 (2020 07:57), Max: 37.4 (2020 07:57)  T(F): 99.3 (2020 07:57), Max: 99.3 (2020 07:57)  HR: 88 (2020 09:00) (79 - 88)  BP: 131/71 (2020 09:00) (101/53 - 131/71)  BP(mean): 78 (2020 09:00) (73 - 78)  RR: 19 (2020 09:00) (12 - 19)  SpO2: 94% (2020 09:00) (94% - 97%)    General:lethargic     Karnofsky:  %20    HEENT: dry mouth     Lungs: comfortable    CV: normal    GI: incontinent, NGT     : incontinent      MSK: weakness  edema +1 BLE bedbound    Skin: thin frail skin     LABS:                        11.0   11.18 )-----------( 142      ( 2020 02:05 )             32.1     04-06    139  |  97<L>  |  52.0<H>  ----------------------------<  103<H>  4.0   |  19.0<L>  |  5.30<H>    Ca    9.2      2020 02:05  Mg     2.5     04-06          I&O's Summary    2020 07:  -  2020 07:00  --------------------------------------------------------  IN: 610 mL / OUT: 600 mL / NET: 10 mL    2020 07:  -  2020 12:22  --------------------------------------------------------  IN: 0 mL / OUT: 0 mL / NET: 0 mL        RADIOLOGY & ADDITIONAL STUDIES:  CXR 20  IMPRESSION: Mild congestive changes. Status post right-sided central venous catheter removal..    CT head 20IMPRESSION:  No acute intracranial hemorrhage or acute territorial infarct.  If symptoms persist, follow-up MRI exam recommended.  Nonspecific incidental asymmetric prominent left superior ophthalmic vein.  Nonspecific linear hyperdensity partially visualized at the level of oral cavity on image 1. Correlate clinically    ADVANCE DIRECTIVES:   Pending Ethics Consult Palliative Care Consult  HPI: This is a 74 year old female PMHx COPD (current smoker) lung nodule (s/p wedge resection) hypothyroid, admitted to [facility] with althered mental status. Family found patient unresponsiv. In ED patient arousable, hypotensive, with multiple fentanyl patches on her which were removed by staff in ED. Narcan given, initially responded to but soon after had a non sustained GTCS for ~ 30sec which responded to 2 mg Ativan. Intubated for airway protection. No reports of chest pain, SOB, Nausea, vomiting, diarrhea, fever, chills. Patient tested for COVID19, negative. Family requesting patient be DNR DNI.  In patient's suicide note it reflects that she "was tired of trying to get everyone to love me". PA of ICU Ludy called Brittaney Marinelli for ethics consult regarding code status/advance directive decision making for the patient. This is a Palliative Care Consult    <HPI:  74F current smoker w/ PMHx COPD, lung nodule s/p wedge resection, hypothyroid was BIBEMS with altered mental status. She was found by her family unresponsive and w/ agonal respirations around 6PM on . She was arousable but hypotensive on initial eval and was found to have multiple fentanyl patches on her which were removed by the ED nursing staff. Narcan was given which she initially responded to but soon after had a non sustained GTCS for ~ 30sec which responded to 2 mg Ativan. Intubated for airway protection. LDH, CRP and ferritin were elevated and pt is currently being r/o COVID infection. CXR w/ L retrocardiac opacity. RVP negative (31 Mar 2020 01:20)> end of copied text    PERTINENT PMH REVIEWED: Yes chart reviewed  PAST MEDICAL & SURGICAL HISTORY:  Lung nodule  COPD (chronic obstructive pulmonary disease)  Hyperlipidemia  Hypothyroid  Chronic knee pain  S/P hysterectomy  S/P  section      SOCIAL HISTORY:                                     Admitted from:  home      Surrogate/HCP/Guardian: Phone#:    FAMILY HISTORY:  No family history related to admission diagnosis    Baseline ADLs (prior to admission):  Independent    Allergies  No Known Allergies    Present Symptoms:   Dyspnea: 0   Nausea/Vomiting: Unable to obtain due to poor mentation   Anxiety:  Unable to obtain due to poor mentation   Depression: Unable to obtain due to poor mentation   Fatigue: Yes   Loss of appetite: Yes     Pain: Appears comfortable            Character-            Duration-            Effect-            Factors-            Frequency-            Location-            Severity-    Review of Systems: Reviewed  Per HPI, all other ROS negative  Difficult to obtain due to poor mentation     MEDICATIONS  (STANDING):  ALBUTerol    90 MICROgram(s) HFA Inhaler 2 Puff(s) Inhalation every 6 hours  dextrose 5%. 1000 milliLiter(s) (50 mL/Hr) IV Continuous <Continuous>  dextrose 50% Injectable 12.5 Gram(s) IV Push once  dextrose 50% Injectable 25 Gram(s) IV Push once  dextrose 50% Injectable 25 Gram(s) IV Push once  heparin  Injectable 5000 Unit(s) SubCutaneous every 8 hours  insulin lispro (HumaLOG) corrective regimen sliding scale   SubCutaneous every 6 hours  ipratropium 17 MICROgram(s) HFA Inhaler 1 Puff(s) Inhalation every 6 hours  levETIRAcetam  IVPB 500 milliGRAM(s) IV Intermittent every 12 hours  levothyroxine Injectable 50 MICROGram(s) IV Push at bedtime  midodrine 10 milliGRAM(s) Oral every 8 hours  pantoprazole  Injectable 40 milliGRAM(s) IV Push daily  QUEtiapine 25 milliGRAM(s) Oral daily    MEDICATIONS  (PRN):  ALBUTerol    90 MICROgram(s) HFA Inhaler 1 Puff(s) Inhalation every 4 hours PRN Bronchospasm  dextrose 40% Gel 15 Gram(s) Oral once PRN Blood Glucose LESS THAN 70 milliGRAM(s)/deciliter  glucagon  Injectable 1 milliGRAM(s) IntraMuscular once PRN Glucose LESS THAN 70 milligrams/deciliter      Vital Signs Last 24 Hrs  T(C): 37.4 (2020 07:57), Max: 37.4 (2020 07:57)  T(F): 99.3 (2020 07:57), Max: 99.3 (2020 07:57)  HR: 88 (2020 09:00) (79 - 88)  BP: 131/71 (2020 09:00) (101/53 - 131/71)  BP(mean): 78 (2020 09:00) (73 - 78)  RR: 19 (2020 09:00) (12 - 19)  SpO2: 94% (2020 09:00) (94% - 97%)    General:lethargic     Karnofsky:  %20    HEENT: dry mouth     Lungs: comfortable    CV: normal    GI: incontinent, NGT     : incontinent      MSK: weakness  edema +1 BLE bedbound    Skin: thin frail skin     LABS:                        11.0   11.18 )-----------( 142      ( 2020 02:05 )             32.1     04-06    139  |  97<L>  |  52.0<H>  ----------------------------<  103<H>  4.0   |  19.0<L>  |  5.30<H>    Ca    9.2      2020 02:05  Mg     2.5     04-06          I&O's Summary    2020 07:01  -  2020 07:00  --------------------------------------------------------  IN: 610 mL / OUT: 600 mL / NET: 10 mL    2020 07:  -  2020 12:22  --------------------------------------------------------  IN: 0 mL / OUT: 0 mL / NET: 0 mL        RADIOLOGY & ADDITIONAL STUDIES:  CXR 04.05.20  IMPRESSION: Mild congestive changes. Status post right-sided central venous catheter removal..    CT scan head  IMPRESSION:  No acute intracranial hemorrhage or acute territorial infarct.  If symptoms persist, follow-up MRI exam recommended.  Nonspecific incidental asymmetric prominent left superior ophthalmic vein.  Nonspecific linear hyperdensity partially visualized at the level of oral cavity on image 1. Correlate clinically  ADVANCE DIRECTIVES:   Pending Ethics Consult Palliative Care Consult  HPI: This is a 74 year old female PMHx COPD (current smoker) lung nodule (s/p wedge resection) hypothyroid, admitted to [facility] with althered mental status. Family found patient unresponsiv. In ED patient arousable, hypotensive, with multiple fentanyl patches on her which were removed by staff in ED. Narcan given, initially responded to but soon after had a non sustained GTCS for ~ 30sec which responded to 2 mg Ativan. Intubated for airway protection. No reports of chest pain, SOB, Nausea, vomiting, diarrhea, fever, chills. Patient tested for COVID19, negative. Family requesting patient be DNR DNI.  In patient's suicide note it reflects that she "was tired of trying to get everyone to love me". PA of ICU Ludy called Brittaney Marinelli for ethics consult regarding code status/advance directive decision making for the patient. This is a Palliative Care Consult    <HPI:  74F current smoker w/ PMHx COPD, lung nodule s/p wedge resection, hypothyroid was BIBEMS with altered mental status. She was found by her family unresponsive and w/ agonal respirations around 6PM on . She was arousable but hypotensive on initial eval and was found to have multiple fentanyl patches on her which were removed by the ED nursing staff. Narcan was given which she initially responded to but soon after had a non sustained GTCS for ~ 30sec which responded to 2 mg Ativan. Intubated for airway protection. LDH, CRP and ferritin were elevated and pt is currently being r/o COVID infection. CXR w/ L retrocardiac opacity. RVP negative (31 Mar 2020 01:20)> end of copied text    PERTINENT PMH REVIEWED: Yes chart reviewed  PAST MEDICAL & SURGICAL HISTORY:  Lung nodule  COPD (chronic obstructive pulmonary disease)  Hyperlipidemia  Hypothyroid  Chronic knee pain  S/P hysterectomy  S/P  section      SOCIAL HISTORY:                                     Admitted from:  home      Surrogate/HCP/Guardian: Phone#:    FAMILY HISTORY:  No family history related to admission diagnosis    Baseline ADLs (prior to admission):  Independent    Allergies  No Known Allergies    Present Symptoms:   Dyspnea: 0   Nausea/Vomiting: Unable to obtain due to poor mentation   Anxiety:  Unable to obtain due to poor mentation   Depression: Unable to obtain due to poor mentation   Fatigue: Yes   Loss of appetite: Yes     Pain: Appears comfortable            Character-            Duration-            Effect-            Factors-            Frequency-            Location-            Severity-    Review of Systems: Reviewed  Per HPI, all other ROS negative  Difficult to obtain due to poor mentation     MEDICATIONS  (STANDING):  ALBUTerol    90 MICROgram(s) HFA Inhaler 2 Puff(s) Inhalation every 6 hours  dextrose 5%. 1000 milliLiter(s) (50 mL/Hr) IV Continuous <Continuous>  dextrose 50% Injectable 12.5 Gram(s) IV Push once  dextrose 50% Injectable 25 Gram(s) IV Push once  dextrose 50% Injectable 25 Gram(s) IV Push once  heparin  Injectable 5000 Unit(s) SubCutaneous every 8 hours  insulin lispro (HumaLOG) corrective regimen sliding scale   SubCutaneous every 6 hours  ipratropium 17 MICROgram(s) HFA Inhaler 1 Puff(s) Inhalation every 6 hours  levETIRAcetam  IVPB 500 milliGRAM(s) IV Intermittent every 12 hours  levothyroxine Injectable 50 MICROGram(s) IV Push at bedtime  midodrine 10 milliGRAM(s) Oral every 8 hours  pantoprazole  Injectable 40 milliGRAM(s) IV Push daily  QUEtiapine 25 milliGRAM(s) Oral daily    MEDICATIONS  (PRN):  ALBUTerol    90 MICROgram(s) HFA Inhaler 1 Puff(s) Inhalation every 4 hours PRN Bronchospasm  dextrose 40% Gel 15 Gram(s) Oral once PRN Blood Glucose LESS THAN 70 milliGRAM(s)/deciliter  glucagon  Injectable 1 milliGRAM(s) IntraMuscular once PRN Glucose LESS THAN 70 milligrams/deciliter      Vital Signs Last 24 Hrs  T(C): 37.4 (2020 07:57), Max: 37.4 (2020 07:57)  T(F): 99.3 (2020 07:57), Max: 99.3 (2020 07:57)  HR: 88 (2020 09:00) (79 - 88)  BP: 131/71 (2020 09:00) (101/53 - 131/71)  BP(mean): 78 (2020 09:00) (73 - 78)  RR: 19 (2020 09:00) (12 - 19)  SpO2: 94% (2020 09:00) (94% - 97%)    General:lethargic     Karnofsky:  %20    HEENT: dry mouth     Lungs: comfortable    CV: normal    GI: incontinent, NGT     : incontinent      MSK: weakness  edema +1 BLE bedbound    Skin: thin frail skin     LABS:                        11.0   11.18 )-----------( 142      ( 2020 02:05 )             32.1     04-06    139  |  97<L>  |  52.0<H>  ----------------------------<  103<H>  4.0   |  19.0<L>  |  5.30<H>    Ca    9.2      2020 02:05  Mg     2.5     04-06          I&O's Summary    2020 07:01  -  2020 07:00  --------------------------------------------------------  IN: 610 mL / OUT: 600 mL / NET: 10 mL    2020 07:  -  2020 12:22  --------------------------------------------------------  IN: 0 mL / OUT: 0 mL / NET: 0 mL    RADIOLOGY & ADDITIONAL STUDIES:  CXR 04.05.20  IMPRESSION: Mild congestive changes. Status post right-sided central venous catheter removal..    CT scan head  IMPRESSION:  No acute intracranial hemorrhage or acute territorial infarct.  If symptoms persist, follow-up MRI exam recommended.  Nonspecific incidental asymmetric prominent left superior ophthalmic vein.  Nonspecific linear hyperdensity partially visualized at the level of oral cavity on image 1. Correlate clinically    ADVANCE DIRECTIVES:   Pending Ethics Consult Palliative Care Consult  HPI: This is a 74 year old female PMHx COPD (current smoker) lung nodule (s/p wedge resection) hypothyroid, admitted to [facility] with althered mental status. Family found patient unresponsiv. In ED patient arousable, hypotensive, with multiple fentanyl patches on her which were removed by staff in ED. Narcan given, initially responded to but soon after had a non sustained GTCS for ~ 30sec which responded to 2 mg Ativan. Intubated for airway protection. No reports of chest pain, SOB, Nausea, vomiting, diarrhea, fever, chills. Patient tested for COVID19, negative. Family requesting patient be DNR DNI.  In patient's suicide note it reflects that she "was tired of trying to get everyone to love me". PA of ICU Ludy called Brittaney Marinelli for ethics consult regarding code status/advance directive decision making for the patient. This is a Palliative Care Consult    <HPI:  74F current smoker w/ PMHx COPD, lung nodule s/p wedge resection, hypothyroid was BIBEMS with altered mental status. She was found by her family unresponsive and w/ agonal respirations around 6PM on . She was arousable but hypotensive on initial eval and was found to have multiple fentanyl patches on her which were removed by the ED nursing staff. Narcan was given which she initially responded to but soon after had a non sustained GTCS for ~ 30sec which responded to 2 mg Ativan. Intubated for airway protection. LDH, CRP and ferritin were elevated and pt is currently being r/o COVID infection. CXR w/ L retrocardiac opacity. RVP negative (31 Mar 2020 01:20)> end of copied text    PERTINENT PMH REVIEWED: Yes chart reviewed  PAST MEDICAL & SURGICAL HISTORY:  Lung nodule  COPD (chronic obstructive pulmonary disease)  Hyperlipidemia  Hypothyroid  Chronic knee pain  S/P hysterectomy  S/P  section      SOCIAL HISTORY:                                     Admitted from:  home      Surrogate/HCP/Guardian: Phone#: Mayra harris Omar Babb [phone]    FAMILY HISTORY:  No family history related to admission diagnosis    Baseline ADLs (prior to admission):  Independent    Allergies  No Known Allergies    Present Symptoms:   Dyspnea: 0   Nausea/Vomiting: Unable to obtain due to poor mentation   Anxiety:  Unable to obtain due to poor mentation   Depression: Unable to obtain due to poor mentation   Fatigue: Yes   Loss of appetite: Yes     Pain: Appears comfortable            Character-            Duration-            Effect-            Factors-            Frequency-            Location-            Severity-    Review of Systems: Reviewed  Per HPI, all other ROS negative  Difficult to obtain due to poor mentation     MEDICATIONS  (STANDING):  ALBUTerol    90 MICROgram(s) HFA Inhaler 2 Puff(s) Inhalation every 6 hours  dextrose 5%. 1000 milliLiter(s) (50 mL/Hr) IV Continuous <Continuous>  dextrose 50% Injectable 12.5 Gram(s) IV Push once  dextrose 50% Injectable 25 Gram(s) IV Push once  dextrose 50% Injectable 25 Gram(s) IV Push once  heparin  Injectable 5000 Unit(s) SubCutaneous every 8 hours  insulin lispro (HumaLOG) corrective regimen sliding scale   SubCutaneous every 6 hours  ipratropium 17 MICROgram(s) HFA Inhaler 1 Puff(s) Inhalation every 6 hours  levETIRAcetam  IVPB 500 milliGRAM(s) IV Intermittent every 12 hours  levothyroxine Injectable 50 MICROGram(s) IV Push at bedtime  midodrine 10 milliGRAM(s) Oral every 8 hours  pantoprazole  Injectable 40 milliGRAM(s) IV Push daily  QUEtiapine 25 milliGRAM(s) Oral daily    MEDICATIONS  (PRN):  ALBUTerol    90 MICROgram(s) HFA Inhaler 1 Puff(s) Inhalation every 4 hours PRN Bronchospasm  dextrose 40% Gel 15 Gram(s) Oral once PRN Blood Glucose LESS THAN 70 milliGRAM(s)/deciliter  glucagon  Injectable 1 milliGRAM(s) IntraMuscular once PRN Glucose LESS THAN 70 milligrams/deciliter      Vital Signs Last 24 Hrs  T(C): 37.4 (2020 07:57), Max: 37.4 (2020 07:57)  T(F): 99.3 (2020 07:57), Max: 99.3 (2020 07:57)  HR: 88 (2020 09:00) (79 - 88)  BP: 131/71 (2020 09:00) (101/53 - 131/71)  BP(mean): 78 (2020 09:00) (73 - 78)  RR: 19 (2020 09:00) (12 - 19)  SpO2: 94% (2020 09:00) (94% - 97%)    General:lethargic     Karnofsky:  %20    HEENT: dry mouth     Lungs: comfortable    CV: normal    GI: incontinent, NGT     : incontinent      MSK: weakness  edema +1 BLE bedbound    Skin: thin frail skin     LABS:                        11.0   11.18 )-----------( 142      ( 2020 02:05 )             32.1     04-06    139  |  97<L>  |  52.0<H>  ----------------------------<  103<H>  4.0   |  19.0<L>  |  5.30<H>    Ca    9.2      2020 02:05  Mg     2.5     04-06          I&O's Summary    2020 07:  -  2020 07:00  --------------------------------------------------------  IN: 610 mL / OUT: 600 mL / NET: 10 mL    2020 07:01  -  2020 12:22  --------------------------------------------------------  IN: 0 mL / OUT: 0 mL / NET: 0 mL    RADIOLOGY & ADDITIONAL STUDIES:  CXR 0405.20  IMPRESSION: Mild congestive changes. Status post right-sided central venous catheter removal..    CT scan head  IMPRESSION:  No acute intracranial hemorrhage or acute territorial infarct.  If symptoms persist, follow-up MRI exam recommended.  Nonspecific incidental asymmetric prominent left superior ophthalmic vein.  Nonspecific linear hyperdensity partially visualized at the level of oral cavity on image 1. Correlate clinically    ADVANCE DIRECTIVES:   DNR DNI MOLST  Comfort measures in the event patient is actively dying

## 2020-04-06 NOTE — CHART NOTE - NSCHARTNOTEFT_GEN_A_CORE
Upon Nutritional Assessment by the Registered Dietitian your patient was determined to meet criteria / has evidence of the following diagnosis/diagnoses:          [ ]  Mild Protein Calorie Malnutrition        [x ]  Moderate Protein Calorie Malnutrition        [ ] Severe Protein Calorie Malnutrition        [ ] Unspecified Protein Calorie Malnutrition        [ ] Underweight / BMI <19        [ ] Morbid Obesity / BMI > 40      Findings as based on:  •  Comprehensive nutrition assessment and consultation  •  Calorie counts (nutrient intake analysis)  •  Food acceptance and intake status from observations by staff  •  Follow up  •  Patient education  •  Intervention secondary to interdisciplinary rounds  •   concerns      Treatment:    The following diet has been recommended:    Increase Nepro to 50ml/hr     PROVIDER Section:     By signing this assessment you are acknowledging and agree with the diagnosis/diagnoses assigned by the Registered Dietitian    Comments:

## 2020-04-06 NOTE — PROGRESS NOTE ADULT - SUBJECTIVE AND OBJECTIVE BOX
chart reviewed and patient seen in dialysis unit Suicide note  copy noted in chart  Obtunded spontaneous movements of extremities noted  Vital Signs Last 24 Hrs  T(C): 37.4 (06 Apr 2020 07:57), Max: 37.4 (06 Apr 2020 07:57)  T(F): 99.3 (06 Apr 2020 07:57), Max: 99.3 (06 Apr 2020 07:57)  HR: 88 (06 Apr 2020 09:00) (79 - 97)  BP: 131/71 (06 Apr 2020 09:00) (101/53 - 131/71)  BP(mean): 78 (06 Apr 2020 09:00) (73 - 84)  RR: 19 (06 Apr 2020 09:00) (12 - 19)  SpO2: 94% (06 Apr 2020 09:00) (94% - 97%)                        11.0   11.18 )-----------( 142      ( 06 Apr 2020 02:05 )             32.1     04-06    139  |  97<L>  |  52.0<H>  ----------------------------<  103<H>  4.0   |  19.0<L>  |  5.30<H>    Ca    9.2      06 Apr 2020 02:05  Mg     2.5     04-06    MEDICATIONS  (STANDING):  ALBUTerol    90 MICROgram(s) HFA Inhaler 2 Puff(s) Inhalation every 6 hours  dextrose 5%. 1000 milliLiter(s) (50 mL/Hr) IV Continuous <Continuous>  dextrose 50% Injectable 12.5 Gram(s) IV Push once  dextrose 50% Injectable 25 Gram(s) IV Push once  dextrose 50% Injectable 25 Gram(s) IV Push once  heparin  Injectable 5000 Unit(s) SubCutaneous every 8 hours  insulin lispro (HumaLOG) corrective regimen sliding scale   SubCutaneous every 6 hours  ipratropium 17 MICROgram(s) HFA Inhaler 1 Puff(s) Inhalation every 6 hours  levETIRAcetam  IVPB 500 milliGRAM(s) IV Intermittent every 12 hours  levothyroxine Injectable 50 MICROGram(s) IV Push at bedtime  midodrine 10 milliGRAM(s) Oral every 8 hours  pantoprazole  Injectable 40 milliGRAM(s) IV Push daily  QUEtiapine 25 milliGRAM(s) Oral daily

## 2020-04-06 NOTE — PROGRESS NOTE ADULT - SUBJECTIVE AND OBJECTIVE BOX
Vital Signs Last 24 Hrs,    T(C): 37.4 (2020 07:57), Max: 37.4 (2020 07:57)  T(F): 99.3 (2020 07:57), Max: 99.3 (2020 07:57)  HR: 79 (2020 07:57) (79 - 97)  BP: 130/84 (2020 07:57) (101/53 - 130/84)  BP(mean): 74 (2020 20:00) (73 - 84)  RR: 16 (2020 07:57) (12 - 16)  SpO2: 97% (2020 07:57) (94% - 97%)    139    |  97<L>  |  52.0<H>  ----------------------------<  103<H>  Ca:9.2   (2020 02:05)  4.0     |  19.0<L>  |  5.30<H>    eGFR if Non : 7 <L>  eGFR if : 9 <L>    TPro  5.2<L>  /  Alb  2.7<L>  /  TBili  0.2<L>  /  DBili  0.1    /  AST  61<H>  /  ALT  410<H>  /  AlkPhos  89     2020 03:57                               11.0<L>  11.18<H> )-----------( 142<L>    ( 2020 02:05 )                   32.1<L>    M.5 mg/dL.,    Chief Complaint: SUSAN/ ongoing hemodialysis requirement    PAST HISTORY  --------------------------------------------------------------------------------  No significant changes to PMH, PSH, FHx, SHx, unless otherwise noted    ALLERGIES & MEDICATIONS  --------------------------------------------------------------------------------  Allergies    No Known Allergies    Standing Inpatient Medications  ALBUTerol    90 MICROgram(s) HFA Inhaler 2 Puff(s) Inhalation every 6 hours  dextrose 5%. 1000 milliLiter(s) IV Continuous <Continuous>  dextrose 50% Injectable 12.5 Gram(s) IV Push once  dextrose 50% Injectable 25 Gram(s) IV Push once  dextrose 50% Injectable 25 Gram(s) IV Push once  heparin  Injectable 5000 Unit(s) SubCutaneous every 8 hours  ipratropium 17 MICROgram(s) HFA Inhaler 1 Puff(s) Inhalation every 6 hours  levETIRAcetam  IVPB 500 milliGRAM(s) IV Intermittent every 12 hours  levothyroxine Injectable 50 MICROGram(s) IV Push at bedtime  midodrine 10 milliGRAM(s) Oral every 8 hours  pantoprazole  Injectable 40 milliGRAM(s) IV Push daily  QUEtiapine 25 milliGRAM(s) Oral daily    PRN Inpatient Medications  ALBUTerol    90 MICROgram(s) HFA Inhaler 1 Puff(s) Inhalation every 4 hours PRN  dextrose 40% Gel 15 Gram(s) Oral once PRN  glucagon  Injectable 1 milliGRAM(s) IntraMuscular once PRN    REVIEW OF SYSTEMS  --------------------------------------------------------------------------------  Gen: No weight changes, fatigue, fevers/chills, weakness  Skin: No rashes  Head/Eyes/Ears/Mouth: No headache  Respiratory: No dyspnea, cough,  CV: No chest pain, orthopnea  GI: No abdominal pain, diarrhea, constipation, nausea, vomiting,  MSK: No joint pain  Neuro: No dizziness/lightheadedness, weakness  Heme: No bleeding  Psych: No significant nervousness, anxiety, stress, depression    All other systems were reviewed and are negative, except as noted.    VITALS/PHYSICAL EXAM  --------------------------------------------------------------------------------  T(C): 36.9 (20 @ 05:00), Max: 37.4 (20 @ 12:00)  HR: 87 (20 @ 06:00) (87 - 988)  BP: 105/48 (20 @ 06:00) (105/48 - 148/55)  RR: 16 (20 06:00) (14 - 17)  SpO2: 97% (20 @ 06:00) (92% - 97%)    20 @ 07:01  -  20 @ 07:00  --------------------------------------------------------  IN: 440 mL / OUT: 1955 mL / NET: -1515 mL    20 @ 07:01  -  20 @ 11:42  --------------------------------------------------------  IN: 90 mL / OUT: 0 mL / NET: 90 mL    Physical Exam:  	Gen: NAD, well-appearing  	HEENT: PERRL, supple neck,  	Pulm: CTA B/L  	CV: RRR, S1S2; no rub  	Abd: +BS, soft, nontender  	UE: Warm, intact strength; no asterixis  	LE: Warm, + edema  	Neuro: No focal deficits  	Psych: Normal affect and mood  	Skin: Warm, without rashes  	Vascular access:    LABS/STUDIES  --------------------------------------------------------------------------------              10.7   10.21 >-----------<  124      [20 03:56]              32.0     136  |  93  |  41.0  ----------------------------<  55      [20 03:56]  4.3   |  19.0  |  4.34        Ca     8.6     [20 03:56]      Mg     2.4     [20 03:56]    TPro  5.2  /  Alb  2.7  /  TBili  0.2  /  DBili  0.1  /  AST  61  /  ALT  410  /  AlkPhos  89  [20 03:57]    PT/INR: PT 14.3 , INR 1.26       [20 03:57]  PTT: 27.7       [20 03:57]    Ferritin 3576      [20 @ 18:51]  HbA1c 5.7      [20 03:44]  TSH 1.97      [20 03:57]  Lipid: chol 106, , HDL 31, LDL 27      [20 @ 02:49]    HBsAb Nonreact      [20 @ 07:33]  HBsAg Nonreact      [20 07:33]  HCV 0.18, Nonreact      [20 07:33]  HIV Nonreact      [20 02:49]    SUSAN- likely ATN- normal baseline kidney function as per chart review of [facility]  Acute Hypoxic respiratory failure related to Intentional Narcotic OD   COVID-19- negative  Anemia    Hgb at goal  Monitor Scr, lytes, UOP,

## 2020-04-06 NOTE — PROGRESS NOTE ADULT - ASSESSMENT
Acute Hypoxic respiratory failure related to Intentional Narcotic Overdose    Plan:    Neuro - Currently not following commands, not combative, withdraws from painful stimuli only. On Seroquel. Psych Evaluation on 4/4 for suicide attempt, Constant observation.  CV - Hemodynamically stable on midodrine.  Pulm -  Extubated currently on NC with O2 sat > 90%.  GI - On TF ( Nepro )  Renal - Acute Tubular Necrosis, now on HD, Bailey removed, Bladder scan q 12 hrs,    Antibiotics discontinued , Blood Cultures 3/30 and 4/1 Negative to date.    Consider Palliative care consult and possible ethics consult if there is a question on MOLST form.

## 2020-04-06 NOTE — PROGRESS NOTE ADULT - ASSESSMENT
Acute Hypoxic respiratory failure related to Intentional Narcotic Overdose    Plan:  Neuro - Currently not following commands, not combative, withdraws from painful stimuli only. Try to void sedatives.  Started on Seroquel. Psych Evaluation on 4/4 for suicide attempt, no new recommendations. Constant observation.  CV - Hemodynamically stable on midodrine.  Pulm -  Extubated currently on NC with O2 sat > 90%.  GI -  NPO, Continue TFs nepro, discuss advancing rate to goal.  Renal - Acute renal failure, now on HD, Bailey removed, Bladder scan q 12 hrs, HD done yesterday 4/4, renal team following, plan for HD later today.  Heme -  Pharmacologic DVT PPx with Heparin subcutaneously in addition to SCD's.  ID - Antibiotics discontinued due to Blood Cultures 3/30 and 4/1 Negative to date.    Consider downgrading patient to the floor as stable and is not requiring ICU level care currently.   Consider Palliative care consult and possible ethics consult if there is a question on MOLST form.

## 2020-04-07 LAB
ALBUMIN SERPL ELPH-MCNC: 2.9 G/DL — LOW (ref 3.3–5.2)
ALP SERPL-CCNC: 81 U/L — SIGNIFICANT CHANGE UP (ref 40–120)
ALT FLD-CCNC: 135 U/L — HIGH
ANION GAP SERPL CALC-SCNC: 22 MMOL/L — HIGH (ref 5–17)
ANION GAP SERPL CALC-SCNC: 23 MMOL/L — HIGH (ref 5–17)
AST SERPL-CCNC: 32 U/L — HIGH
BILIRUB SERPL-MCNC: 0.4 MG/DL — SIGNIFICANT CHANGE UP (ref 0.4–2)
BUN SERPL-MCNC: 46 MG/DL — HIGH (ref 8–20)
BUN SERPL-MCNC: 47 MG/DL — HIGH (ref 8–20)
CALCIUM SERPL-MCNC: 8.9 MG/DL — SIGNIFICANT CHANGE UP (ref 8.6–10.2)
CALCIUM SERPL-MCNC: 9 MG/DL — SIGNIFICANT CHANGE UP (ref 8.6–10.2)
CHLORIDE SERPL-SCNC: 100 MMOL/L — SIGNIFICANT CHANGE UP (ref 98–107)
CHLORIDE SERPL-SCNC: 99 MMOL/L — SIGNIFICANT CHANGE UP (ref 98–107)
CO2 SERPL-SCNC: 20 MMOL/L — LOW (ref 22–29)
CO2 SERPL-SCNC: 21 MMOL/L — LOW (ref 22–29)
CREAT SERPL-MCNC: 4.94 MG/DL — HIGH (ref 0.5–1.3)
CREAT SERPL-MCNC: 5.06 MG/DL — HIGH (ref 0.5–1.3)
GLUCOSE BLDC GLUCOMTR-MCNC: 87 MG/DL — SIGNIFICANT CHANGE UP (ref 70–99)
GLUCOSE BLDC GLUCOMTR-MCNC: 94 MG/DL — SIGNIFICANT CHANGE UP (ref 70–99)
GLUCOSE SERPL-MCNC: 84 MG/DL — SIGNIFICANT CHANGE UP (ref 70–99)
GLUCOSE SERPL-MCNC: 85 MG/DL — SIGNIFICANT CHANGE UP (ref 70–99)
HCT VFR BLD CALC: 32.5 % — LOW (ref 34.5–45)
HGB BLD-MCNC: 10.7 G/DL — LOW (ref 11.5–15.5)
MAGNESIUM SERPL-MCNC: 2.5 MG/DL — SIGNIFICANT CHANGE UP (ref 1.6–2.6)
MCHC RBC-ENTMCNC: 30.9 PG — SIGNIFICANT CHANGE UP (ref 27–34)
MCHC RBC-ENTMCNC: 32.9 GM/DL — SIGNIFICANT CHANGE UP (ref 32–36)
MCV RBC AUTO: 93.9 FL — SIGNIFICANT CHANGE UP (ref 80–100)
PLATELET # BLD AUTO: 163 K/UL — SIGNIFICANT CHANGE UP (ref 150–400)
POTASSIUM SERPL-MCNC: 4 MMOL/L — SIGNIFICANT CHANGE UP (ref 3.5–5.3)
POTASSIUM SERPL-MCNC: 4 MMOL/L — SIGNIFICANT CHANGE UP (ref 3.5–5.3)
POTASSIUM SERPL-SCNC: 4 MMOL/L — SIGNIFICANT CHANGE UP (ref 3.5–5.3)
POTASSIUM SERPL-SCNC: 4 MMOL/L — SIGNIFICANT CHANGE UP (ref 3.5–5.3)
PROT SERPL-MCNC: 5.7 G/DL — LOW (ref 6.6–8.7)
RBC # BLD: 3.46 M/UL — LOW (ref 3.8–5.2)
RBC # FLD: 14.3 % — SIGNIFICANT CHANGE UP (ref 10.3–14.5)
SODIUM SERPL-SCNC: 142 MMOL/L — SIGNIFICANT CHANGE UP (ref 135–145)
SODIUM SERPL-SCNC: 143 MMOL/L — SIGNIFICANT CHANGE UP (ref 135–145)
WBC # BLD: 12.29 K/UL — HIGH (ref 3.8–10.5)
WBC # FLD AUTO: 12.29 K/UL — HIGH (ref 3.8–10.5)

## 2020-04-07 PROCEDURE — 99232 SBSQ HOSP IP/OBS MODERATE 35: CPT

## 2020-04-07 PROCEDURE — 93970 EXTREMITY STUDY: CPT | Mod: 26

## 2020-04-07 PROCEDURE — 99223 1ST HOSP IP/OBS HIGH 75: CPT

## 2020-04-07 PROCEDURE — 71045 X-RAY EXAM CHEST 1 VIEW: CPT | Mod: 26

## 2020-04-07 PROCEDURE — 99233 SBSQ HOSP IP/OBS HIGH 50: CPT

## 2020-04-07 PROCEDURE — 99291 CRITICAL CARE FIRST HOUR: CPT

## 2020-04-07 RX ORDER — HEPARIN SODIUM 5000 [USP'U]/ML
6500 INJECTION INTRAVENOUS; SUBCUTANEOUS EVERY 6 HOURS
Refills: 0 | Status: DISCONTINUED | OUTPATIENT
Start: 2020-04-07 | End: 2020-04-12

## 2020-04-07 RX ORDER — HEPARIN SODIUM 5000 [USP'U]/ML
INJECTION INTRAVENOUS; SUBCUTANEOUS
Qty: 25000 | Refills: 0 | Status: DISCONTINUED | OUTPATIENT
Start: 2020-04-07 | End: 2020-04-12

## 2020-04-07 RX ORDER — LEVETIRACETAM 250 MG/1
250 TABLET, FILM COATED ORAL EVERY 12 HOURS
Refills: 0 | Status: COMPLETED | OUTPATIENT
Start: 2020-04-07 | End: 2020-04-08

## 2020-04-07 RX ORDER — HEPARIN SODIUM 5000 [USP'U]/ML
3000 INJECTION INTRAVENOUS; SUBCUTANEOUS EVERY 6 HOURS
Refills: 0 | Status: DISCONTINUED | OUTPATIENT
Start: 2020-04-07 | End: 2020-04-12

## 2020-04-07 RX ADMIN — Medication 50 MICROGRAM(S): at 22:56

## 2020-04-07 RX ADMIN — MIDODRINE HYDROCHLORIDE 10 MILLIGRAM(S): 2.5 TABLET ORAL at 13:14

## 2020-04-07 RX ADMIN — PANTOPRAZOLE SODIUM 40 MILLIGRAM(S): 20 TABLET, DELAYED RELEASE ORAL at 13:14

## 2020-04-07 RX ADMIN — LEVETIRACETAM 400 MILLIGRAM(S): 250 TABLET, FILM COATED ORAL at 18:20

## 2020-04-07 RX ADMIN — HEPARIN SODIUM 1400 UNIT(S)/HR: 5000 INJECTION INTRAVENOUS; SUBCUTANEOUS at 23:04

## 2020-04-07 RX ADMIN — HEPARIN SODIUM 5000 UNIT(S): 5000 INJECTION INTRAVENOUS; SUBCUTANEOUS at 05:38

## 2020-04-07 RX ADMIN — HEPARIN SODIUM 5000 UNIT(S): 5000 INJECTION INTRAVENOUS; SUBCUTANEOUS at 13:14

## 2020-04-07 RX ADMIN — LEVETIRACETAM 420 MILLIGRAM(S): 250 TABLET, FILM COATED ORAL at 05:38

## 2020-04-07 NOTE — PROGRESS NOTE ADULT - SUBJECTIVE AND OBJECTIVE BOX
Patient is a 74y old  Female who presents with a chief complaint of Resp failure and seizure (2020 11:47)    HPI:  74F current smoker w/ PMHx COPD, lung nodule s/p wedge resection, hypothyroid was BIBEMS with altered mental status. She was found by her family unresponsive and w/ agonal respirations around 6PM on . She was arousable but hypotensive on initial eval and was found to have multiple fentanyl patches on her which were removed by the ED nursing staff. Narcan was given which she initially responded to but soon after had a non sustained GTCS for ~ 30sec which responded to 2 mg Ativan. Intubated for airway protection. LDH, CRP and ferritin were elevated and pt is currently being r/o COVID infection. CXR w/ L retrocardiac opacity. RVP negative (31 Mar 2020 01:20)    PAST MEDICAL & SURGICAL HISTORY:  Lung nodule  COPD (chronic obstructive pulmonary disease)  Hyperlipidemia  Hypothyroid  Chronic knee pain  S/P hysterectomy  S/P  section    Subjective: Patient lying in bed unresponsive to questioning. +Withdraws from painful stimuli and opens eyes. +Congested cough. Moves legs to the side and contracts head forward. Unable to obtain full ROS due to mental status. Patient was not making urine. Last night >700 residual in bladder. Last HD yesterday 20 with -1.2L taken off.     Review of Systems:   Unable to obtain due to: Altered mental status, patient responds to noxious or painful stimuli.    Vital Signs Last 24 Hrs  T(C): 36.3 (20 @ 16:00), Max: 37.4 (20 @ 07:57)  T(F): 97.3 (20 @ 16:00), Max: 99.3 (20 @ 07:57)  HR: 79 (20 @ 20:00) (79 - 88)  BP: 146/72 (20 @ 20:00) (101/53 - 146/72)  RR: 15 (20 @ 20:00) (14 - 19)  SpO2: 100% (20 @ 20:00) (94% - 100%)  on (O2)               MEDICATIONS  ALBUTerol    90 MICROgram(s) HFA Inhaler 2 Puff(s) Inhalation every 6 hours  ALBUTerol    90 MICROgram(s) HFA Inhaler 1 Puff(s) Inhalation every 4 hours PRN once PRN  heparin  Injectable 5000 Unit(s) SubCutaneous every 8 hours  insulin lispro (HumaLOG) corrective regimen sliding scale   SubCutaneous every 6 hours  ipratropium 17 MICROgram(s) HFA Inhaler 1 Puff(s) Inhalation every 6 hours  levETIRAcetam  IVPB 500 milliGRAM(s) IV Intermittent every 12 hours  levothyroxine Injectable 50 MICROGram(s) IV Push at bedtime  midodrine 10 milliGRAM(s) Oral every 8 hours  pantoprazole  Injectable 40 milliGRAM(s) IV Push daily  QUEtiapine 25 milliGRAM(s) Oral daily      PHYSICAL EXAM  General:  well nourished, no acute distress, Vital signs remain stable on monitor. SpO2 stable on room air currently.    Neurology:  somnolent minimally responsive  Respiratory: +Congested coughg. Course breath sounds/snoring respirations b/l. No wheezing/rhonchi/rales appreciated.   CV:  regular rate and rhythm, normal S1 S2  Abdomen:  soft, nontender, nondistended, positive bowel sounds  Extremities:  warm, well perfused, 1+ edema b/l UEs, +DP pulses palpable      I&O's Detail    2020 07:  -  2020 07:00  --------------------------------------------------------  IN:    Free Water: 120 mL    Jevity: 150 mL    Nepro: 240 mL    Solution: 100 mL  Total IN: 610 mL    OUT:    Indwelling Catheter - Urethral: 600 mL  Total OUT: 600 mL    Total NET: 10 mL      2020 07:01  -  2020 00:37  --------------------------------------------------------  IN:    Free Water: 30 mL    Nepro: 240 mL    Solution: 100 mL  Total IN: 370 mL    OUT:    Intermittent Catheterization - Urethral: 750 mL    Other: 1100 mL  Total OUT: 1850 mL    Total NET: -1480 mL      Weights:  Daily Weight in k.7 (2020 10:25)  Admit Wt: Drug Dosing Weight  Height (cm): 162.6 (31 Mar 2020 08:29)  Weight (kg): 80 (2020 21:09)  BMI (kg/m2): 30.3 (2020 21:09)  BSA (m2): 1.85 (2020 21:09)    All laboratory results, radiology and medications reviewed.    LABS        139  |  97<L>  |  52.0<H>  ----------------------------<  103<H>  4.0   |  19.0<L>  |  5.30<H>    Ca    9.2      2020 02:05  Mg     2.5     04-                                   11.0   11.18 )-----------( 142      ( 2020 02:05 )             32.1            CAPILLARY BLOOD GLUCOSE  POCT Blood Glucose.: 94 mg/dL (2020 00:29)  POCT Blood Glucose.: 94 mg/dL (2020 18:10)  POCT Blood Glucose.: 84 mg/dL (2020 12:13)  POCT Blood Glucose.: 96 mg/dL (2020 06:38)  POCT Blood Glucose.: 94 mg/dL (2020 01:07)      Last CXR:  < from: Xray Chest 1 View- PORTABLE-Routine (20 @ 06:58) >  FINDINGS:  Single frontal view of the chest demonstrates mild congestive changes. Status post right-sided central venous catheter removal. No large pneumothorax. NG tube tip is in the stomach. The cardiomediastinal silhouette is normal. No acute osseous abnormalities. Overlying EKG leads and wires are noted. Possible tiny left-sided pleural effusion.  IMPRESSION: Mild congestive changes. Status post right-sided central venous catheter removal..  < end of copied text >

## 2020-04-07 NOTE — PROGRESS NOTE ADULT - SUBJECTIVE AND OBJECTIVE BOX
[facility] DIVISION OF KIDNEY DISEASES AND HYPERTENSION -- FOLLOW UP NOTE  --------------------------------------------------------------------------------  Chief Complaint: : SUSAN/ ongoing hemodialysis requirement    24 hour events/subjective:  HD yesterday- 1.45hrs secondary to clotting in the venous chamber. Removed 1.1 liters of fluid    PAST HISTORY  --------------------------------------------------------------------------------  No significant changes to PMH, PSH, FHx, SHx, unless otherwise noted    ALLERGIES & MEDICATIONS  --------------------------------------------------------------------------------  Allergies  No Known Allergies    Standing Inpatient Medications  ALBUTerol    90 MICROgram(s) HFA Inhaler 2 Puff(s) Inhalation every 6 hours  heparin  Injectable 5000 Unit(s) SubCutaneous every 8 hours  ipratropium 17 MICROgram(s) HFA Inhaler 1 Puff(s) Inhalation every 6 hours  levETIRAcetam  IVPB 500 milliGRAM(s) IV Intermittent every 12 hours  levothyroxine Injectable 50 MICROGram(s) IV Push at bedtime  midodrine 10 milliGRAM(s) Oral every 8 hours  pantoprazole  Injectable 40 milliGRAM(s) IV Push daily    PRN Inpatient Medications  ALBUTerol    90 MICROgram(s) HFA Inhaler 1 Puff(s) Inhalation every 4 hours PRN      REVIEW OF SYSTEMS  --------------------------------------------------------------------------------  Gen: No fatigue, fevers/chills, weakness  Skin: No rashes  Head/Eyes/Ears/Mouth: No headache  Respiratory: No dyspnea, wheezing, hemoptysis  CV: No chest pain  GI: No, Nausea, abdominal pain, diarrhea, constipation, vomiting  : No changes in urination  MSK:  No joint pain/swelling; no back pain  Neuro: No dizziness/lightheadedness  Psych: No anxiety, depression    All other systems were reviewed and are negative, except as noted.    VITALS/PHYSICAL EXAM  --------------------------------------------------------------------------------  T(C): 36.4 (04-07-20 @ 08:00), Max: 37 (04-07-20 @ 00:00)  HR: 76 (04-07-20 @ 08:00) (75 - 86)  BP: 134/62 (04-07-20 @ 08:00) (134/62 - 154/70)  RR: 18 (04-07-20 @ 08:00) (14 - 18)  SpO2: 95% (04-07-20 @ 08:00) (93% - 100%)    04-06-20 @ 07:01  -  04-07-20 @ 07:00  --------------------------------------------------------  IN: 530 mL / OUT: 1850 mL / NET: -1320 mL    04-07-20 @ 07:01  -  04-07-20 @ 10:58  --------------------------------------------------------  IN: 0 mL / OUT: 0 mL / NET: 0 mL    Physical Exam:  	Gen: somnolent  	HEENT: supple neck  	Pulm: CTA B/L  	CV: RRR, S1S2; no rub  	Back: PRAVEENA  	Abd: +BS, soft, nontender/nondistended. +NG tube  	: No suprapubic tenderness  	UE: Warm, no edema  	LE: Warm, no edema  	Neuro: No focal deficits  	Psych: non-verbal  	Skin: Warm, without rashes  	Vascular access: Right femoral vein    LABS/STUDIES  --------------------------------------------------------------------------------              10.7   12.29 >-----------<  163      [04-07-20 @ 08:55]              32.5     142  |  99  |  47.0  ----------------------------<  85      [04-07-20 @ 08:55]  4.0   |  21.0  |  5.06        Ca     9.0     [04-07-20 @ 08:55]      Mg     2.5     [04-07-20 @ 08:55]    TPro  5.7  /  Alb  2.9  /  TBili  0.4  /  DBili  x   /  AST  32  /  ALT  135  /  AlkPhos  81  [04-07-20 @ 08:55]    Creatinine Trend:  SCr 5.06 [04-07 @ 08:55]  SCr 5.30 [04-06 @ 02:05]  SCr 4.34 [04-05 @ 03:56]  SCr 4.12 [04-04 @ 03:57]  SCr 3.68 [04-03 @ 19:20]    Urinalysis - [04-01-20 @ 06:36]      Color Yellow / Appearance Slightly Turbid / SG 1.020 / pH 5.0      Gluc Negative / Ketone Trace  / Bili Negative / Urobili 1       Blood Large / Protein 100 / Leuk Est Trace / Nitrite Negative      RBC 11-25 / WBC 3-5 / Hyaline  / Gran  / Sq Epi  / Non Sq Epi Occasional / Bacteria Moderate    Ferritin 3576      [03-30-20 @ 18:51]  HbA1c 5.7      [04-03-20 @ 03:44]  TSH 1.97      [04-04-20 @ 03:57]  Lipid: chol 106, , HDL 31, LDL 27      [04-01-20 @ 02:49]    HBsAb Nonreact      [04-01-20 @ 07:33]  HBsAg Nonreact      [04-01-20 @ 07:33]  HCV 0.18, Nonreact      [04-01-20 @ 07:33]  HIV Nonreact      [04-01-20 @ 02:49] [facility] DIVISION OF KIDNEY DISEASES AND HYPERTENSION -- FOLLOW UP NOTE  --------------------------------------------------------------------------------  Chief Complaint: : SUSAN/ ongoing hemodialysis requirement    24 hour events/subjective:  HD yesterday- 1.45hrs secondary to clotting in the venous chamber. Removed 1.1 liters of fluid    PAST HISTORY  --------------------------------------------------------------------------------  No significant changes to PMH, PSH, FHx, SHx, unless otherwise noted    ALLERGIES & MEDICATIONS  --------------------------------------------------------------------------------  Allergies  No Known Allergies    Standing Inpatient Medications  ALBUTerol    90 MICROgram(s) HFA Inhaler 2 Puff(s) Inhalation every 6 hours  heparin  Injectable 5000 Unit(s) SubCutaneous every 8 hours  ipratropium 17 MICROgram(s) HFA Inhaler 1 Puff(s) Inhalation every 6 hours  levETIRAcetam  IVPB 500 milliGRAM(s) IV Intermittent every 12 hours  levothyroxine Injectable 50 MICROGram(s) IV Push at bedtime  midodrine 10 milliGRAM(s) Oral every 8 hours  pantoprazole  Injectable 40 milliGRAM(s) IV Push daily    PRN Inpatient Medications  ALBUTerol    90 MICROgram(s) HFA Inhaler 1 Puff(s) Inhalation every 4 hours PRN      REVIEW OF SYSTEMS  --------------------------------------------------------------------------------  somnolent- unable to obtain ROS    VITALS/PHYSICAL EXAM  --------------------------------------------------------------------------------  T(C): 36.4 (04-07-20 @ 08:00), Max: 37 (04-07-20 @ 00:00)  HR: 76 (04-07-20 @ 08:00) (75 - 86)  BP: 134/62 (04-07-20 @ 08:00) (134/62 - 154/70)  RR: 18 (04-07-20 @ 08:00) (14 - 18)  SpO2: 95% (04-07-20 @ 08:00) (93% - 100%)    04-06-20 @ 07:01  -  04-07-20 @ 07:00  --------------------------------------------------------  IN: 530 mL / OUT: 1850 mL / NET: -1320 mL    04-07-20 @ 07:01  -  04-07-20 @ 10:58  --------------------------------------------------------  IN: 0 mL / OUT: 0 mL / NET: 0 mL    Physical Exam:  	Gen: somnolent  	HEENT: supple neck  	Pulm: CTA B/L  	CV: RRR, S1S2; no rub  	Back: PRAVEENA  	Abd: +BS, soft, nontender/nondistended. +NG tube  	: No suprapubic tenderness  	UE: Warm, no edema  	LE: Warm, no edema  	Neuro: No focal deficits  	Psych: non-verbal  	Skin: Warm, without rashes  	Vascular access: Right femoral vein    LABS/STUDIES  --------------------------------------------------------------------------------              10.7   12.29 >-----------<  163      [04-07-20 @ 08:55]              32.5     142  |  99  |  47.0  ----------------------------<  85      [04-07-20 @ 08:55]  4.0   |  21.0  |  5.06        Ca     9.0     [04-07-20 @ 08:55]      Mg     2.5     [04-07-20 @ 08:55]    TPro  5.7  /  Alb  2.9  /  TBili  0.4  /  DBili  x   /  AST  32  /  ALT  135  /  AlkPhos  81  [04-07-20 @ 08:55]    Creatinine Trend:  SCr 5.06 [04-07 @ 08:55]  SCr 5.30 [04-06 @ 02:05]  SCr 4.34 [04-05 @ 03:56]  SCr 4.12 [04-04 @ 03:57]  SCr 3.68 [04-03 @ 19:20]    Urinalysis - [04-01-20 @ 06:36]      Color Yellow / Appearance Slightly Turbid / SG 1.020 / pH 5.0      Gluc Negative / Ketone Trace  / Bili Negative / Urobili 1       Blood Large / Protein 100 / Leuk Est Trace / Nitrite Negative      RBC 11-25 / WBC 3-5 / Hyaline  / Gran  / Sq Epi  / Non Sq Epi Occasional / Bacteria Moderate    Ferritin 3576      [03-30-20 @ 18:51]  HbA1c 5.7      [04-03-20 @ 03:44]  TSH 1.97      [04-04-20 @ 03:57]  Lipid: chol 106, , HDL 31, LDL 27      [04-01-20 @ 02:49]    HBsAb Nonreact      [04-01-20 @ 07:33]  HBsAg Nonreact      [04-01-20 @ 07:33]  HCV 0.18, Nonreact      [04-01-20 @ 07:33]  HIV Nonreact      [04-01-20 @ 02:49]

## 2020-04-07 NOTE — PROGRESS NOTE ADULT - SUBJECTIVE AND OBJECTIVE BOX
PULMONARY PROGRESS NOTE      JOSE M KEARNEYBRITTANI-[id number]    Patient is a 74y old  Female who presents with a chief complaint of Resp failure and seizure (2020 00:36)  Mild COPD  Prior lung cancer  Suicide attempt with fentanyl  Seizure  SUSAN  Shock liver  Shock - resolved (midodrine alone)  COVID neg      INTERVAL HPI/OVERNIGHT EVENTS:  More stable  Poor mental status    MEDICATIONS  (STANDING):  ALBUTerol    90 MICROgram(s) HFA Inhaler 2 Puff(s) Inhalation every 6 hours  heparin  Injectable 5000 Unit(s) SubCutaneous every 8 hours  ipratropium 17 MICROgram(s) HFA Inhaler 1 Puff(s) Inhalation every 6 hours  levETIRAcetam  IVPB 500 milliGRAM(s) IV Intermittent every 12 hours  levothyroxine Injectable 50 MICROGram(s) IV Push at bedtime  midodrine 10 milliGRAM(s) Oral every 8 hours  pantoprazole  Injectable 40 milliGRAM(s) IV Push daily      MEDICATIONS  (PRN):  ALBUTerol    90 MICROgram(s) HFA Inhaler 1 Puff(s) Inhalation every 4 hours PRN Bronchospasm      Allergies    No Known Allergies    Intolerances        PAST MEDICAL & SURGICAL HISTORY:  Lung nodule  COPD (chronic obstructive pulmonary disease)  Hyperlipidemia  Hypothyroid  Chronic knee pain  S/P hysterectomy  S/P  section      SOCIAL HISTORY  Smoking History:       REVIEW OF SYSTEMS:    Non-communicative    Vital Signs Last 24 Hrs  T(C): 36.4 (2020 08:00), Max: 37 (2020 00:00)  T(F): 97.5 (2020 08:00), Max: 98.6 (2020 00:00)  HR: 76 (2020 08:00) (75 - 86)  BP: 134/62 (2020 08:00) (134/62 - 154/70)  BP(mean): 79 (2020 08:00) (79 - 93)  RR: 18 (2020 08:00) (14 - 18)  SpO2: 95% (2020 08:00) (93% - 100%)    PHYSICAL EXAMINATION:    GENERAL: The patient is awake and alert in no apparent distress.     HEENT: Head is normocephalic and atraumatic. Extraocular muscles are intact. Mucous membranes are moist.    NECK: Supple.    LUNGS: Clear to auscultation without wheezing, rales or rhonchi; respirations unlabored    HEART: Regular rate and rhythm without murmur.    ABDOMEN: Soft, nontender, and nondistended.      EXTREMITIES: Without any cyanosis, clubbing, rash, lesions or edema.    NEUROLOGIC: Grossly intact.    LABS:                        10.7   12.29 )-----------( 163      ( 2020 08:55 )             32.5     04-07    142  |  99  |  47.0<H>  ----------------------------<  85  4.0   |  21.0<L>  |  5.06<H>    Ca    9.0      2020 08:55  Mg     2.5     04-07    TPro  5.7<L>  /  Alb  2.9<L>  /  TBili  0.4  /  DBili  x   /  AST  32<H>  /  ALT  135<H>  /  AlkPhos  81  04-07        RADIOLOGY & ADDITIONAL STUDIES:  CXR 20  MIld CHF

## 2020-04-07 NOTE — CONSULT NOTE ADULT - ASSESSMENT
The patient is a 74y Female with seizure in the setting of intentional overdose.     Seizure.   Would taper Keppra as can have behavioral side effects.   If any further seizure would consider Vimpat or valproic acid.     Encephalopathy.   Likely toxic-metabolic.     Intentional overdose.   Followed by the behavioral health service.     Case discussed with PACU PAs under Dr Lopez.

## 2020-04-07 NOTE — PROGRESS NOTE ADULT - ASSESSMENT
Assess    Post arrest encephalopathy  ARF  DNR  HCP's wishes cloudy    Plan    Per CTS pending medicine  BD Rx  Renal for HD for now  Not ICU requiring  Palliative/ ? ethics re- HD  Midodrine as needed  Toward RAKAN ASAP

## 2020-04-07 NOTE — CONSULT NOTE ADULT - SUBJECTIVE AND OBJECTIVE BOX
[facility]                                        Neurology at [place]                                  [facility]akes[facility]                                      [address]                                           [place], [place], [address]                                                [phone]        CC: seizure and encephalopathy.     HISTORY:  The patient is a 74y Female who was admitted on 3/30/2020 after being found unresponsive with agonal respirations. She was found to have multiple fentanyl patches on her skin which were removed in the ER. She was given Narcan with transient improvement but ultimately had a generalized tonic clonic seizure and was intubated in the ER.   She was started on Keppra.   She was extubated on 2020.  I was called today to evaluate need for long term Keppra.     PAST MEDICAL & SURGICAL HISTORY:  Lung nodule  COPD (chronic obstructive pulmonary disease)  Hyperlipidemia  Hypothyroid  Chronic knee pain  S/P hysterectomy  S/P  section    MEDICATIONS  (STANDING):  ALBUTerol    90 MICROgram(s) HFA Inhaler 2 Puff(s) Inhalation every 6 hours  heparin  Injectable 5000 Unit(s) SubCutaneous every 8 hours  ipratropium 17 MICROgram(s) HFA Inhaler 1 Puff(s) Inhalation every 6 hours  levETIRAcetam  IVPB 500 milliGRAM(s) IV Intermittent every 12 hours  levothyroxine Injectable 50 MICROGram(s) IV Push at bedtime  midodrine 10 milliGRAM(s) Oral every 8 hours  pantoprazole  Injectable 40 milliGRAM(s) IV Push daily    MEDICATIONS  (PRN):  ALBUTerol    90 MICROgram(s) HFA Inhaler 1 Puff(s) Inhalation every 4 hours PRN Bronchospasm      Allergies  No Known Allergies    SOCIAL HISTORY:  Smoker.     FAMILY HISTORY:  No pertinent family history in first degree relatives.  Patient unable to provide further family history.     ROS:  Constitutional: Unobtainable due to patient's condition.   Neuro: Unobtainable due to patient's condition.   Eyes: Unobtainable due to patient's condition.   Ears/nose/throat: Unobtainable due to patient's condition.   Cardiac: Unobtainable due to patient's condition.   Respiratory: Unobtainable due to patient's condition.   GI: Unobtainable due to patient's condition.   : Unobtainable due to patient's condition..  Integumentary: Unobtainable due to patient's condition.  Psych: Unobtainable due to patient's condition.  Heme: Unobtainable due to patient's condition.     Exam:  Vital Signs Last 24 Hrs  T(C): 36.4 (2020 08:00), Max: 37 (2020 00:00)  T(F): 97.5 (2020 08:00), Max: 98.6 (2020 00:00)  HR: 76 (2020 08:00) (75 - 86)  BP: 134/62 (2020 08:00) (134/62 - 154/70)  BP(mean): 79 (2020 08:00) (79 - 93)  RR: 18 (2020 08:00) (14 - 18)  SpO2: 95% (2020 08:00) (93% - 100%)  General: NAD.   Carotid bruits absent.     Mental status: Sleepy but opens eyes to voice. She does not answer questions or follow instructions. She only maintains eye opening briefly.     Cranial nerves: There is no papilledema (direct ophthalmoscope). Pupils react symmetrically to light. She blinks to bilateral threat to confrontation. Extraocular motion is full with no nystagmus. Facial sensation is intact. Facial musculature is symmetric. Palate elevates symmetrically. Tongue is midline.    Motor/Sensory: There is normal bulk and tone.  Symmetric movement to stimuli although diffusely weak arms more than legs.     Reflexes: 1+ throughout and plantar responses are flexor.    Cerebellar: Unable to assess.     LABS:                         10.7   12.29 )-----------( 163      ( 2020 08:55 )             32.5       04-07    142  |  99  |  47.0<H>  ----------------------------<  85  4.0   |  21.0<L>  |  5.06<H>    Ca    9.0      2020 08:55  Mg     2.5     04-07    TPro  5.7<L>  /  Alb  2.9<L>  /  TBili  0.4  /  DBili  x   /  AST  32<H>  /  ALT  135<H>  /  AlkPhos  81  04-07    RADIOLOGY   CT head images reviewed (and concur with report): There is no acute pathology.

## 2020-04-07 NOTE — PROGRESS NOTE ADULT - ASSESSMENT
Acute Hypoxic respiratory failure related to Intentional Narcotic Overdose    Plan:  Neuro - Currently not following commands, not combative, withdraws from painful stimuli. Try to void sedatives.  Started on Seroquel. Psych Evaluation on 4/4 for suicide attempt, no new recommendations. Constant observation. Follow up Keppra level.   CV - Hemodynamically stable on midodrine. Midodrine held last night due to elevated BP.  Pulm -  Extubated currently on room air with O2 sat > 90%.  GI -  NPO, Continue TFs nepro, advance rate to goal.  Renal - Acute renal failure, now on HD, Last HD 4/6/20 with -1.2L taken off, no mcneill in place, Bladder scan q 12 hrs, >700cc residual urine noted last night, consider placing mcneill if patient continues to make urine, renal team following.  Heme -  Pharmacologic DVT PPx with Heparin subcutaneously in addition to SCD's.  ID - Antibiotics discontinued due to Blood Cultures 3/30 and 4/1 Negative to date.    Palliative care consult and ethics consult with new MOLST form filled out. Both patient's son and daughter together will act as the HCP.    Consider downgrading patient to the floor as stable and is not requiring ICU level care currently.     Code Status: DNR    Case including assessment/plan of care discussed with attendings on AM rounds.

## 2020-04-07 NOTE — PROGRESS NOTE ADULT - ASSESSMENT
Acute Hypoxic respiratory failure related to Intentional Narcotic Overdose (Suicide attempt with fentanyl)  Post arrest encephalopathy    Acute Tubular Necrosis, now on HD  Bailey removed, Bladder scan q 12 hrs w/ straight cath    Plan for HD tomorrow    DNR  Palliative care consult and ethics consult with new MOLST form filled out. Both patient's son and daughter together will act as the HCP. Acute Hypoxic respiratory failure related to Intentional Narcotic Overdose (Suicide attempt with fentanyl)  Post arrest encephalopathy  anemia    Acute Tubular Necrosis, now on HD  Bailey removed, Bladder scan q 12 hrs w/ straight cath  Plan for HD tomorrow  Hb at goal- monitor H/H

## 2020-04-08 DIAGNOSIS — J96.01 ACUTE RESPIRATORY FAILURE WITH HYPOXIA: ICD-10-CM

## 2020-04-08 DIAGNOSIS — N17.9 ACUTE KIDNEY FAILURE, UNSPECIFIED: ICD-10-CM

## 2020-04-08 DIAGNOSIS — J44.9 CHRONIC OBSTRUCTIVE PULMONARY DISEASE, UNSPECIFIED: ICD-10-CM

## 2020-04-08 LAB
ANION GAP SERPL CALC-SCNC: 20 MMOL/L — HIGH (ref 5–17)
APTT BLD: 77.6 SEC — HIGH (ref 27.5–36.3)
APTT BLD: 78.7 SEC — HIGH (ref 27.5–36.3)
BUN SERPL-MCNC: 52 MG/DL — HIGH (ref 8–20)
CALCIUM SERPL-MCNC: 9 MG/DL — SIGNIFICANT CHANGE UP (ref 8.6–10.2)
CHLORIDE SERPL-SCNC: 103 MMOL/L — SIGNIFICANT CHANGE UP (ref 98–107)
CO2 SERPL-SCNC: 21 MMOL/L — LOW (ref 22–29)
CREAT SERPL-MCNC: 5.51 MG/DL — HIGH (ref 0.5–1.3)
GLUCOSE SERPL-MCNC: 127 MG/DL — HIGH (ref 70–99)
HCT VFR BLD CALC: 35.9 % — SIGNIFICANT CHANGE UP (ref 34.5–45)
HGB BLD-MCNC: 11.9 G/DL — SIGNIFICANT CHANGE UP (ref 11.5–15.5)
MAGNESIUM SERPL-MCNC: 2.4 MG/DL — SIGNIFICANT CHANGE UP (ref 1.8–2.6)
MCHC RBC-ENTMCNC: 31.5 PG — SIGNIFICANT CHANGE UP (ref 27–34)
MCHC RBC-ENTMCNC: 33.1 GM/DL — SIGNIFICANT CHANGE UP (ref 32–36)
MCV RBC AUTO: 95 FL — SIGNIFICANT CHANGE UP (ref 80–100)
PLATELET # BLD AUTO: 179 K/UL — SIGNIFICANT CHANGE UP (ref 150–400)
POTASSIUM SERPL-MCNC: 3.9 MMOL/L — SIGNIFICANT CHANGE UP (ref 3.5–5.3)
POTASSIUM SERPL-SCNC: 3.9 MMOL/L — SIGNIFICANT CHANGE UP (ref 3.5–5.3)
RBC # BLD: 3.78 M/UL — LOW (ref 3.8–5.2)
RBC # FLD: 14.4 % — SIGNIFICANT CHANGE UP (ref 10.3–14.5)
SODIUM SERPL-SCNC: 144 MMOL/L — SIGNIFICANT CHANGE UP (ref 135–145)
WBC # BLD: 12.21 K/UL — HIGH (ref 3.8–10.5)
WBC # FLD AUTO: 12.21 K/UL — HIGH (ref 3.8–10.5)

## 2020-04-08 PROCEDURE — 99232 SBSQ HOSP IP/OBS MODERATE 35: CPT

## 2020-04-08 PROCEDURE — 71045 X-RAY EXAM CHEST 1 VIEW: CPT | Mod: 26

## 2020-04-08 PROCEDURE — 99233 SBSQ HOSP IP/OBS HIGH 50: CPT

## 2020-04-08 PROCEDURE — 90937 HEMODIALYSIS REPEATED EVAL: CPT

## 2020-04-08 RX ORDER — FUROSEMIDE 40 MG
80 TABLET ORAL
Refills: 0 | Status: DISCONTINUED | OUTPATIENT
Start: 2020-04-09 | End: 2020-04-10

## 2020-04-08 RX ORDER — PSYLLIUM SEED (WITH DEXTROSE)
1 POWDER (GRAM) ORAL
Refills: 0 | Status: DISCONTINUED | OUTPATIENT
Start: 2020-04-08 | End: 2020-04-12

## 2020-04-08 RX ADMIN — HEPARIN SODIUM 1400 UNIT(S)/HR: 5000 INJECTION INTRAVENOUS; SUBCUTANEOUS at 22:51

## 2020-04-08 RX ADMIN — LEVETIRACETAM 400 MILLIGRAM(S): 250 TABLET, FILM COATED ORAL at 22:57

## 2020-04-08 RX ADMIN — LEVETIRACETAM 400 MILLIGRAM(S): 250 TABLET, FILM COATED ORAL at 05:22

## 2020-04-08 RX ADMIN — Medication 1 PACKET(S): at 23:02

## 2020-04-08 NOTE — PROGRESS NOTE ADULT - SUBJECTIVE AND OBJECTIVE BOX
[facility]                                        Neurology at [place]                                  [facility]                                      [address]                                           [place], [place], [address]                                                [phone]        CC: seizure and encephalopathy.     HISTORY:  The patient is a 74y Female who was admitted on 3/30/2020 after being found unresponsive with agonal respirations. She was found to have multiple fentanyl patches on her skin which were removed in the ER. She was given Narcan with transient improvement but ultimately had a generalized tonic clonic seizure and was intubated in the ER.   She was started on Keppra.   She was extubated on 4/2/2020.  I was called today to evaluate need for long term Keppra. (JW)    Interval history: arouses to noxious stimuli    ROS neurology: unable to obtain due to mental status    MEDICATIONS  (STANDING):  ALBUTerol    90 MICROgram(s) HFA Inhaler 2 Puff(s) Inhalation every 6 hours  heparin  Infusion.  Unit(s)/Hr (14 mL/Hr) IV Continuous <Continuous>  ipratropium 17 MICROgram(s) HFA Inhaler 1 Puff(s) Inhalation every 6 hours  levETIRAcetam  IVPB 250 milliGRAM(s) IV Intermittent every 12 hours  levothyroxine Injectable 50 MICROGram(s) IV Push at bedtime  pantoprazole  Injectable 40 milliGRAM(s) IV Push daily    MEDICATIONS  (PRN):  ALBUTerol    90 MICROgram(s) HFA Inhaler 1 Puff(s) Inhalation every 4 hours PRN Bronchospasm  heparin  Injectable 6500 Unit(s) IV Push every 6 hours PRN For aPTT less than 40  heparin  Injectable 3000 Unit(s) IV Push every 6 hours PRN For aPTT between 40 - 57      Vital Signs Last 24 Hrs  T(C): 37.1 (08 Apr 2020 11:45), Max: 37.2 (08 Apr 2020 09:41)  T(F): 98.8 (08 Apr 2020 11:45), Max: 98.9 (08 Apr 2020 09:41)  HR: 68 (08 Apr 2020 13:00) (65 - 71)  BP: 128/62 (08 Apr 2020 13:00) (110/57 - 150/65)  BP(mean): 79 (08 Apr 2020 13:00) (78 - 90)  RR: 21 (08 Apr 2020 13:00) (18 - 21)  SpO2: 94% (08 Apr 2020 13:00) (93% - 97%)    Detailed neuro exam:    Mental status: Sleepy but opens eyes to noxious stimuli. She does not answer questions or follow instructions.     Cranial nerves:  Pupils react symmetrically to light. She blinks to bilateral threat to confrontation. she does not participate with E)M testing.  Facial musculature is symmetric. Unable to assess palate or tongue    Motor/Sensory: There is normal bulk and tone.  Symmetric movement to stimuli although diffusely weak arms more than legs to noxious stimuli    Reflexes: 1+ throughout and plantar responses are flexor.    Cerebellar: Unable to assess.     LABS:                                    11.9   12.21 )-----------( 179      ( 08 Apr 2020 08:57 )             35.9     04-08    144  |  103  |  52.0<H>  ----------------------------<  127<H>  3.9   |  21.0<L>  |  5.51<H>    Ca    9.0      08 Apr 2020 08:57  Mg     2.4     04-08    TPro  5.7<L>  /  Alb  2.9<L>  /  TBili  0.4  /  DBili  x   /  AST  32<H>  /  ALT  135<H>  /  AlkPhos  81  04-07    LIVER FUNCTIONS - ( 07 Apr 2020 08:55 )  Alb: 2.9 g/dL / Pro: 5.7 g/dL / ALK PHOS: 81 U/L / ALT: 135 U/L / AST: 32 U/L / GGT: x           PTT - ( 08 Apr 2020 10:09 )  PTT:77.6 sec    RADIOLOGY   CT head no acute CVA, mass or blood

## 2020-04-08 NOTE — CONSULT NOTE ADULT - ATTENDING COMMENTS
I have personally seen and examined patient.  Above note reviewed and discussed with resident, modified where appropriate. Pt downgraded to med floor. Hypoxic failure resolved. Psych following. c/w 1:1/ Seizure meds per neuro. Taper off keppra. Last day of HD today. f/u urine output. Restart heparin gtt at 7pm. Fem cath removed

## 2020-04-08 NOTE — PROGRESS NOTE ADULT - ASSESSMENT
Acute Hypoxic respiratory failure related to Intentional Narcotic Overdose (Suicide attempt with fentanyl)  Post arrest encephalopathy  anemia    Acute Tubular Necrosis, now on HD  Bailey removed, Bladder scan q 12 hrs w/ straight cath  HD today  Hb at goal- monitor H/H

## 2020-04-08 NOTE — PROGRESS NOTE BEHAVIORAL HEALTH - NSBHFUPINTERVALHXFT_PSY_A_CORE
discussed with PACU team earlier PA and RN Speech therapist also at bedside  Patient  disoriented confused "I never would do what you say"  had dialysis earlier  labs reveiwed  spoke w daughter Mayra who did pill count of her narcotic analgesics She believes mother took 18 doses in excess based on fill date and usual RX

## 2020-04-08 NOTE — CONSULT NOTE ADULT - ASSESSMENT
75 y/o F current smoker w/ PMHx COPD, lung nodule s/p wedge resection, hypothyroid was BIBEMS with altered mental status. Admitted for suicidal attempt 2/2 opoid overdose (fentanyl patch) and subsequent withdrawal seizure complicated by hypoxic respiratory failure requiring intubation. Now s/p extubation and being transferred to the medical floor      Hypoxic respiratory failure 2/2 suicidal attempt 2/2 opoid overdose (fentanyl patch) requiring intubation.  Now extubated, on room air  Downgrade to medicine service with Dr POTTER bed   Diet: NPO with tube feeds. Will reassess when patient is more awake and oriented  VS Q 4 hrs  Activity as tolerated  Pshyc following.   Antibiotics discontinued due to Blood Cultures 3/30 and 4/1 Negative to date.      Seizure disorder  s/p Ativan in the Ed with good response  Keppra started for seizures prevention now being titrated down as per neuro suggestions  no further seizures  If any further seizure would consider Vimpat or valproic acid per neuro    B/l UE DVT  placed on hep GGT but held today due to central lines removed  Restart GGT at 7 pm today    ATN requiring HD.   Last HD today  Nephro following  Avoid nephrotoxic medications    Hypothyroidism  C/w synthroid    COPD  Not acute exacerbation  C/w albuterol/ipratropium inhaler    PPX  DVT: patient already on full a/c  gastritis: omeprazol  CODE STATUS: DNR as per discussion with son/daughter and palliative team 75 y/o F current smoker w/ PMHx COPD, lung nodule s/p wedge resection, hypothyroid was BIBEMS with altered mental status. Admitted for suicidal attempt 2/2 opoid overdose (fentanyl patch) and subsequent withdrawal seizure complicated by hypoxic respiratory failure requiring intubation. Now s/p extubation and being transferred to the medical floor      Hypoxic respiratory failure 2/2 suicidal attempt 2/2 opoid overdose (fentanyl patch) requiring intubation.  Now extubated, on room air  Downgrade to medicine service with Dr Kaitlynn POTTER bed   Diet: NPO with tube feeds. Will reassess when patient is more awake and oriented  VS Q 4 hrs  Activity as tolerated  Pshyc following.   Antibiotics discontinued due to Blood Cultures 3/30 and 4/1 Negative to date.      Seizure disorder  s/p Ativan in the Ed with good response  Keppra started for seizures prevention now being titrated down as per neuro suggestions  no further seizures  If any further seizure would consider Vimpat or valproic acid per neuro    B/l UE DVT  placed on hep GGT but held today due to central lines removed  Restart GGT at 7 pm today    ATN requiring HD.   Last HD today  Nephro following  Avoid nephrotoxic medications    Hypothyroidism  C/w synthroid    COPD  Not acute exacerbation  C/w albuterol/ipratropium inhaler    PPX  DVT: patient already on full a/c  gastritis: omeprazol  CODE STATUS: DNR as per discussion with son/daughter and palliative team 73 y/o F current smoker w/ PMHx COPD, lung nodule s/p wedge resection, hypothyroid was BIBEMS with altered mental status. Admitted for suicidal attempt 2/2 opoid overdose (fentanyl patch) and subsequent withdrawal seizure complicated by hypoxic respiratory failure requiring intubation. Now s/p extubation and being transferred to the medical floor      Hypoxic respiratory failure 2/2 suicidal attempt 2/2 opoid overdose (fentanyl patch) requiring intubation.  Now extubated, on room air  Downgrade to medicine service with Dr Kaitlynn POTTER bed   Diet: NPO with tube feeds. Will reassess when patient is more awake and oriented  VS Q 4 hrs  Activity as tolerated  Pshyc following.   Antibiotics discontinued due to Blood Cultures 3/30 and 4/1 Negative to date.    Suicical ideation-  c/w 1:1  psych assessment prior to d/c      Seizure disorder  s/p Ativan in the Ed with good response  Keppra started for seizures prevention now being titrated down as per neuro suggestions  no further seizures  If any further seizure would consider Vimpat or valproic acid per neuro    B/l UE DVT  placed on hep GGT but held today due to central lines removed  Restart GGT at 7 pm today    ATN requiring HD.   Last HD today  Nephro following  Avoid nephrotoxic medications  Pt now making urine  Bailey removed, Bladder scan q 12 hrs w/ straight cath        Hypothyroidism  C/w synthroid    COPD  Not acute exacerbation  C/w albuterol/ipratropium inhaler    PPX  DVT: patient already on full a/c  gastritis: omeprazol  CODE STATUS: DNR as per discussion with son/daughter and palliative team

## 2020-04-08 NOTE — PROGRESS NOTE ADULT - ASSESSMENT
The patient is a 74y Female with seizure in the setting of intentional overdose.     Seizure.   Would taper Keppra as can have behavioral side effects.   no further seizure  If any further seizure would consider Vimpat or valproic acid.     Encephalopathy.   Likely toxic-metabolic.     Intentional overdose.   Followed by the behavioral health service.     will follow with you    Curtis Hdez MD PhD   [id number]

## 2020-04-08 NOTE — PROGRESS NOTE ADULT - ASSESSMENT
Acute Hypoxic respiratory failure related to Intentional Narcotic Overdose    Plan:  Neuro - Currently not following commands, not combative, withdraws from painful stimuli. Try to void sedatives.  Started on Seroquel. Psych Evaluation on 4/4 for suicide attempt, no new recommendations. Constant observation. Follow up Keppra level.   CV - Hemodynamically stable on midodrine. Midodrine d/c'd  Pulm -  Extubated currently on room air with O2 sat > 90%.  GI -  NPO, Continue TFs nepro, advance rate to goal.  Renal - Acute renal failure, now on HD, Last HD 4/6/20 with -1.2L taken off, mcneill replaced, pt making approximately 1 liter urine/24 hrs.  Heme -  +DVT b/l upper exts, Heparin gtt started, maintain PTT 60-80  ID - Antibiotics discontinued due to Blood Cultures 3/30 and 4/1 Negative to date.    Palliative care consult and ethics consult with new MOLST form filled out. Both patient's son and daughter together will act as the HCP.    Consider downgrading patient to the floor as stable and is not requiring ICU level care currently.     Code Status: DNR    Case including assessment/plan of care discussed with attendings on AM rounds.

## 2020-04-08 NOTE — PROGRESS NOTE ADULT - NEGATIVE CARDIOVASCULAR SYMPTOMS
no chest pain
no chest pain/no palpitations
no chest pain/no peripheral edema
no chest pain/no peripheral edema/no palpitations

## 2020-04-08 NOTE — PROGRESS NOTE ADULT - SUBJECTIVE AND OBJECTIVE BOX
PULMONARY PROGRESS NOTE      JOSE M KEARNEYYUNIEL-[id number]    Patient is a 74y old  Female who presents with a chief complaint of Resp failure and seizure (2020 04:02)      INTERVAL HPI/OVERNIGHT EVENTS:    Patient is a 74y old  Female who presents with a chief complaint of Resp failure and seizure (2020 00:36)  Mild COPD  Prior lung cancer  Suicide attempt with fentanyl  Seizure  SUSAN  Shock liver  Shock - resolved (midodrine alone)  COVID neg  Going for dialysis   Remains on conventional O2  Mental status remains poor    MEDICATIONS  (STANDING):  ALBUTerol    90 MICROgram(s) HFA Inhaler 2 Puff(s) Inhalation every 6 hours  heparin  Infusion.  Unit(s)/Hr (14 mL/Hr) IV Continuous <Continuous>  ipratropium 17 MICROgram(s) HFA Inhaler 1 Puff(s) Inhalation every 6 hours  levETIRAcetam  IVPB 250 milliGRAM(s) IV Intermittent every 12 hours  levothyroxine Injectable 50 MICROGram(s) IV Push at bedtime  pantoprazole  Injectable 40 milliGRAM(s) IV Push daily      MEDICATIONS  (PRN):  ALBUTerol    90 MICROgram(s) HFA Inhaler 1 Puff(s) Inhalation every 4 hours PRN Bronchospasm  heparin  Injectable 6500 Unit(s) IV Push every 6 hours PRN For aPTT less than 40  heparin  Injectable 3000 Unit(s) IV Push every 6 hours PRN For aPTT between 40 - 57      Allergies    No Known Allergies    Intolerances        PAST MEDICAL & SURGICAL HISTORY:  Lung nodule  COPD (chronic obstructive pulmonary disease)  Hyperlipidemia  Hypothyroid  Chronic knee pain  S/P hysterectomy  S/P  section      SOCIAL HISTORY  Smoking History:       REVIEW OF SYSTEMS:    CONSTITUTIONAL:  No distress      Vital Signs Last 24 Hrs  T(C): 37.2 (2020 09:41), Max: 37.2 (2020 09:41)  T(F): 98.9 (2020 09:41), Max: 98.9 (2020 09:41)  HR: 69 (2020 09:41) (65 - 80)  BP: 138/74 (2020 09:41) (138/66 - 150/72)  BP(mean): 78 (2020 04:00) (78 - 93)  RR: 18 (2020 09:41) (18 - 21)  SpO2: 93% (2020 09:41) (93% - 96%)    PHYSICAL EXAMINATION:    GENERAL: The patient is awake and alert in no apparent distress.     HEENT: Head is normocephalic and atraumatic. Extraocular muscles are intact. Mucous membranes are moist.    NECK: Supple.    LUNGS: Clear to auscultation without wheezing, rales or rhonchi; respirations unlabored    HEART: Regular rate and rhythm without murmur.    ABDOMEN: Soft, nontender, and nondistended.      EXTREMITIES: Without any cyanosis, clubbing, rash, lesions or edema.    NEUROLOGIC: Lethargy    SKIN: No ulceration or induration present.      LABS:                        11.9   12.21 )-----------( 179      ( 2020 08:57 )             35.9     04-08    144  |  103  |  52.0<H>  ----------------------------<  127<H>  3.9   |  21.0<L>  |  5.51<H>    Ca    9.0      2020 08:57  Mg     2.4     04-08    TPro  5.7<L>  /  Alb  2.9<L>  /  TBili  0.4  /  DBili  x   /  AST  32<H>  /  ALT  135<H>  /  AlkPhos  81  04-07    PTT - ( 2020 10:09 )  PTT:77.6 sec                    MICROBIOLOGY:    RADIOLOGY & ADDITIONAL STUDIES:< from: Xray Chest 1 View- PORTABLE-Routine (20 @ 05:11) >   EXAM:  XR CHEST PORTABLE ROUTINE 1V                          PROCEDURE DATE:  2020          INTERPRETATION:  EXAMINATION: XR CHEST    CLINICAL INDICATION: fluid overload    TECHNIQUE: Frontal radiograph of the chest was obtained.    COMPARISON: 20.    FINDINGS:     Cardiac silhouette normal in size. Unchanged left basilar opacity. No pleural effusion or pneumothorax. Enteric tube tip below diaphragm.    IMPRESSION:   Unchanged left basilar opacity                AIDA CHRISTIAN M.D., ATTENDING RADIOLOGIST  This document has been electronically signed. 2020  7:53AM        < end of copied text >

## 2020-04-08 NOTE — PROGRESS NOTE ADULT - SUBJECTIVE AND OBJECTIVE BOX
[facility] DIVISION OF KIDNEY DISEASES AND HYPERTENSION -- HEMODIALYSIS NOTE  --------------------------------------------------------------------------------  Chief Complaint: SUSAN /Ongoing hemodialysis requirement    24 hour events/subjective:        PAST HISTORY  --------------------------------------------------------------------------------  No significant changes to PMH, PSH, FHx, SHx, unless otherwise noted    ALLERGIES & MEDICATIONS  --------------------------------------------------------------------------------  Allergies    No Known Allergies    Intolerances      Standing Inpatient Medications  ALBUTerol    90 MICROgram(s) HFA Inhaler 2 Puff(s) Inhalation every 6 hours  heparin  Infusion.  Unit(s)/Hr IV Continuous <Continuous>  ipratropium 17 MICROgram(s) HFA Inhaler 1 Puff(s) Inhalation every 6 hours  levETIRAcetam  IVPB 250 milliGRAM(s) IV Intermittent every 12 hours  levothyroxine Injectable 50 MICROGram(s) IV Push at bedtime  pantoprazole  Injectable 40 milliGRAM(s) IV Push daily    PRN Inpatient Medications  ALBUTerol    90 MICROgram(s) HFA Inhaler 1 Puff(s) Inhalation every 4 hours PRN  heparin  Injectable 6500 Unit(s) IV Push every 6 hours PRN  heparin  Injectable 3000 Unit(s) IV Push every 6 hours PRN      REVIEW OF SYSTEMS  --------------------------------------------------------------------------------  Gen: No weight changes, fatigue, fevers/chills, weakness  Skin: No rashes  Head/Eyes/Ears/Mouth: No headache  Respiratory: No dyspnea, cough,  CV: No chest pain, orthopnea  GI: No abdominal pain, diarrhea, constipation, nausea, vomiting,  MSK: No joint pain  Neuro: No dizziness/lightheadedness, weakness  Heme: No bleeding  Psych: No significant nervousness, anxiety, stress, depression    All other systems were reviewed and are negative, except as noted.    VITALS/PHYSICAL EXAM  --------------------------------------------------------------------------------  T(C): 37.1 (04-08-20 @ 11:45), Max: 37.2 (04-08-20 @ 09:41)  HR: 69 (04-08-20 @ 11:45) (65 - 71)  BP: 110/57 (04-08-20 @ 11:45) (110/57 - 150/65)  RR: 18 (04-08-20 @ 11:45) (18 - 21)  SpO2: 97% (04-08-20 @ 11:45) (93% - 97%)  Wt(kg): --        04-07-20 @ 07:01  -  04-08-20 @ 07:00  --------------------------------------------------------  IN: 916 mL / OUT: 1154 mL / NET: -238 mL    04-08-20 @ 07:01  -  04-08-20 @ 12:52  --------------------------------------------------------  IN: 64 mL / OUT: 1000 mL / NET: -936 mL      Physical Exam:  	Gen: NAD, well-appearing  	HEENT: PERRL, supple neck,  	Pulm: CTA B/L  	CV: RRR, S1S2; no rub  	Abd: +BS, soft, nontender  	UE: Warm, intact strength; no asterixis  	LE: Warm, + edema  	Neuro: No focal deficits  	Psych: Normal affect and mood  	Skin: Warm, without rashes  	Vascular access:    LABS/STUDIES  --------------------------------------------------------------------------------              11.9   12.21 >-----------<  179      [04-08-20 @ 08:57]              35.9     144  |  103  |  52.0  ----------------------------<  127      [04-08-20 @ 08:57]  3.9   |  21.0  |  5.51        Ca     9.0     [04-08-20 @ 08:57]      Mg     2.4     [04-08-20 @ 08:57]    TPro  5.7  /  Alb  2.9  /  TBili  0.4  /  DBili  x   /  AST  32  /  ALT  135  /  AlkPhos  81  [04-07-20 @ 08:55]      PTT: 77.6       [04-08-20 @ 10:09]      Ferritin 3576      [03-30-20 @ 18:51]  HbA1c 5.7      [04-03-20 @ 03:44]  TSH 1.97      [04-04-20 @ 03:57]  Lipid: chol 106, , HDL 31, LDL 27      [04-01-20 @ 02:49]    HBsAb Nonreact      [04-01-20 @ 07:33]  HBsAg Nonreact      [04-01-20 @ 07:33]  HCV 0.18, Nonreact      [04-01-20 @ 07:33]  HIV Nonreact      [04-01-20 @ 02:49]

## 2020-04-08 NOTE — CONSULT NOTE ADULT - SUBJECTIVE AND OBJECTIVE BOX
Patient is a 74y old  Female who presents with a chief complaint of Resp failure and seizure (2020 16:10)    BRIEF HOSPITAL COURSE: 75 y/o F current smoker w/ PMHx COPD, lung nodule s/p wedge resection, hypothyroid was BIBEMS with altered mental status. Patient admitted for suicidal attempt: opoid overdose (fentanyl patch) and subsequent withdrawal seizure complicated by hypoxic respiratory failure. Patient intubated and transferred to MICU. keppra started fir seizures, now being titrated down. Course further complicated by b/l UE DVT, placed on hep GGT. Also ATN requiring HD. Extubated on 2020, now being weaned off oxygen. Pshyc following. Today patient being downgraded to medical floor. COVID neg. Patient now DNR    Events last 24 hours: Patient off supplemental O2, now being downgraded to the medical floor    PAST MEDICAL & SURGICAL HISTORY:  Lung nodule  COPD (chronic obstructive pulmonary disease)  Hyperlipidemia  Hypothyroid  Chronic knee pain  S/P hysterectomy  S/P  section    Allergies  No Known Allergies    FAMILY HISTORY:  No pertinent family history in first degree relatives    Family history otherwise noncontributory.    Social History: unable to obtain  Review of Systems:    ALL OTHER REVIEW OF SYSTEMS EXCEPT PER HPI NEGATIVE.    Medications:  ALBUTerol    90 MICROgram(s) HFA Inhaler 2 Puff(s) Inhalation every 6 hours  ALBUTerol    90 MICROgram(s) HFA Inhaler 1 Puff(s) Inhalation every 4 hours PRN  ipratropium 17 MICROgram(s) HFA Inhaler 1 Puff(s) Inhalation every 6 hours    levETIRAcetam  IVPB 250 milliGRAM(s) IV Intermittent every 12 hours      heparin  Infusion.  Unit(s)/Hr IV Continuous <Continuous>  heparin  Injectable 6500 Unit(s) IV Push every 6 hours PRN  heparin  Injectable 3000 Unit(s) IV Push every 6 hours PRN    pantoprazole  Injectable 40 milliGRAM(s) IV Push daily  psyllium Powder 1 Packet(s) Oral two times a day      levothyroxine Injectable 50 MICROGram(s) IV Push at bedtime      Vital Signs Last 24 Hrs  T(C): 36.5 (2020 16:00), Max: 37.2 (2020 09:41)  T(F): 97.7 (2020 16:00), Max: 98.9 (2020 09:41)  HR: 78 (2020 16:00) (65 - 78)  BP: 132/76 (2020 16:00) (110/57 - 150/65)  BP(mean): 82 (2020 16:00) (78 - 90)  RR: 21 (2020 16:00) (18 - 21)  SpO2: 96% (2020 16:00) (93% - 97%)    General: Well nourished/Well developed, NAD  Head:  Normocephalic, atraumatic  EENT:  PERRLA, EOMI, Mucosa moist, no ulcerations  Respiratory: CTA B/L  CV: RRR, S1S2, no murmur  GI: Abdomen soft, NT, ND no palpable mass  Extremities: +1 pitting edema, + peripheral pulses, FROM all 4 extremity  Neurology: A&O in persin, no focal signs. rest unable to obtain      I&O's Detail    2020 07:01  -  2020 07:00  --------------------------------------------------------  IN:    Free Water: 40 mL    heparin  Infusion.: 126 mL    Nepro: 700 mL    Solution: 50 mL  Total IN: 916 mL    OUT:    Indwelling Catheter - Urethral: 1154 mL  Total OUT: 1154 mL    Total NET: -238 mL      2020 07:01  -  2020 18:04  --------------------------------------------------------  IN:    heparin  Infusion.: 14 mL    Nepro: 350 mL  Total IN: 364 mL    OUT:    Indwelling Catheter - Urethral: 425 mL    Other: 1000 mL  Total OUT: 1425 mL    Total NET: -1061 mL    LABS:                        11.9   12.21 )-----------( 179      ( 2020 08:57 )             35.9     04-08    144  |  103  |  52.0<H>  ----------------------------<  127<H>  3.9   |  21.0<L>  |  5.51<H>    Ca    9.0      2020 08:57  Mg     2.4     04-08    TPro  5.7<L>  /  Alb  2.9<L>  /  TBili  0.4  /  DBili  x   /  AST  32<H>  /  ALT  135<H>  /  AlkPhos  81  04-07      CAPILLARY BLOOD GLUCOSE      POCT Blood Glucose.: 87 mg/dL (2020 05:10)    PTT - ( 2020 10:09 )  PTT:77.6 sec

## 2020-04-08 NOTE — PROGRESS NOTE ADULT - SUBJECTIVE AND OBJECTIVE BOX
Subjective:  75yo F more responsive today, appropriate      HPI:  74F current smoker w/ PMHx COPD, lung nodule s/p wedge resection, hypothyroid was BIBEMS with altered mental status. She was found by her family unresponsive and w/ agonal respirations around 6PM on . She was arousable but hypotensive on initial eval and was found to have multiple fentanyl patches on her which were removed by the ED nursing staff. Narcan was given which she initially responded to but soon after had a non sustained GTCS for ~ 30sec which responded to 2 mg Ativan. Intubated for airway protection. LDH, CRP and ferritin were elevated and pt is currently being r/o COVID infection. CXR w/ L retrocardiac opacity. RVP negative (31 Mar 2020 01:20)          PAST MEDICAL & SURGICAL HISTORY:  Lung nodule  COPD (chronic obstructive pulmonary disease)  Hyperlipidemia  Hypothyroid  Chronic knee pain  S/P hysterectomy  S/P  section          MEDICATIONS  (STANDING):  ALBUTerol    90 MICROgram(s) HFA Inhaler 2 Puff(s) Inhalation every 6 hours  heparin  Infusion.  Unit(s)/Hr (14 mL/Hr) IV Continuous <Continuous>  ipratropium 17 MICROgram(s) HFA Inhaler 1 Puff(s) Inhalation every 6 hours  levETIRAcetam  IVPB 250 milliGRAM(s) IV Intermittent every 12 hours  levothyroxine Injectable 50 MICROGram(s) IV Push at bedtime  midodrine 10 milliGRAM(s) Oral every 8 hours  pantoprazole  Injectable 40 milliGRAM(s) IV Push daily    MEDICATIONS  (PRN):  ALBUTerol    90 MICROgram(s) HFA Inhaler 1 Puff(s) Inhalation every 4 hours PRN Bronchospasm  heparin  Injectable 6500 Unit(s) IV Push every 6 hours PRN For aPTT less than 40  heparin  Injectable 3000 Unit(s) IV Push every 6 hours PRN For aPTT between 40 - 57          Allergies    No Known Allergies    Intolerances          WEIGHTS:  Daily     Daily   Admit Wt: Drug Dosing Weight  Height (cm): 162.6 (31 Mar 2020 08:29)  Weight (kg): 80 (2020 21:09)  BMI (kg/m2): 30.3 (2020 21:09)  BSA (m2): 1.85 (2020 21:09)  I&O's Summary    2020 07:01  -  2020 07:00  --------------------------------------------------------  IN: 530 mL / OUT: 1850 mL / NET: -1320 mL    2020 07:01  -  2020 04:02  --------------------------------------------------------  IN: 522 mL / OUT: 904 mL / NET: -382 mL        VITAL SIGNS:  ICU Vital Signs Last 24 Hrs  T(C): 36.3 (2020 20:00), Max: 36.4 (2020 08:00)  T(F): 97.3 (2020 20:00), Max: 97.5 (2020 08:00)  HR: 65 (2020 02:00) (65 - 80)  BP: 146/75 (2020 02:00) (134/62 - 150/72)  BP(mean): 90 (2020 02:00) (79 - 93)  ABP: --  ABP(mean): --  RR: 21 (2020 02:00) (18 - 21)  SpO2: 95% (2020 02:00) (94% - 96%)        All laboratory results, radiology and medications reviewed.    LABS:      142  |  99  |  47.0<H>  ----------------------------<  85  4.0   |  21.0<L>  |  5.06<H>    Ca    9.0      2020 08:55  Mg     2.5     07    TPro  5.7<L>  /  Alb  2.9<L>  /  TBili  0.4  /  DBili  x   /  AST  32<H>  /  ALT  135<H>  /  AlkPhos  81  0407                                 10.7   12.29 )-----------( 163      ( 2020 08:55 )             32.5            Bilirubin Total, Serum: 0.4 mg/dL ( @ 08:55)          CAPILLARY BLOOD GLUCOSE      POCT Blood Glucose.: 87 mg/dL (2020 05:10)             PHYSICAL EXAM:  General:  well nourished, no acute distress  Neurology:  more appropriate today, asking questions  Respiratory:  clear to auscultation bilaterally  CV:  regular rate and rhythm, normal S1 S2  Abdomen:  soft, nontender, nondistended, positive bowel sounds  Extremities:  warm, well perfused, no edema +DP pulses

## 2020-04-09 LAB
ALBUMIN SERPL ELPH-MCNC: 3 G/DL — LOW (ref 3.3–5.2)
ALP SERPL-CCNC: 92 U/L — SIGNIFICANT CHANGE UP (ref 40–120)
ALT FLD-CCNC: 101 U/L — HIGH
ANION GAP SERPL CALC-SCNC: 17 MMOL/L — SIGNIFICANT CHANGE UP (ref 5–17)
APTT BLD: 103.1 SEC — HIGH (ref 27.5–36.3)
APTT BLD: 43.1 SEC — HIGH (ref 27.5–36.3)
AST SERPL-CCNC: 36 U/L — HIGH
BILIRUB SERPL-MCNC: 0.3 MG/DL — LOW (ref 0.4–2)
BUN SERPL-MCNC: 40 MG/DL — HIGH (ref 8–20)
CALCIUM SERPL-MCNC: 9.2 MG/DL — SIGNIFICANT CHANGE UP (ref 8.6–10.2)
CHLORIDE SERPL-SCNC: 103 MMOL/L — SIGNIFICANT CHANGE UP (ref 98–107)
CO2 SERPL-SCNC: 24 MMOL/L — SIGNIFICANT CHANGE UP (ref 22–29)
CREAT SERPL-MCNC: 4.06 MG/DL — HIGH (ref 0.5–1.3)
GLUCOSE SERPL-MCNC: 136 MG/DL — HIGH (ref 70–99)
HCT VFR BLD CALC: 35.8 % — SIGNIFICANT CHANGE UP (ref 34.5–45)
HGB BLD-MCNC: 11.4 G/DL — LOW (ref 11.5–15.5)
INR BLD: 1.09 RATIO — SIGNIFICANT CHANGE UP (ref 0.88–1.16)
LEVETIRACETAM SERPL-MCNC: 37.2 MCG/ML — SIGNIFICANT CHANGE UP (ref 12–46)
MAGNESIUM SERPL-MCNC: 2.2 MG/DL — SIGNIFICANT CHANGE UP (ref 1.6–2.6)
MCHC RBC-ENTMCNC: 31.1 PG — SIGNIFICANT CHANGE UP (ref 27–34)
MCHC RBC-ENTMCNC: 31.8 GM/DL — LOW (ref 32–36)
MCV RBC AUTO: 97.5 FL — SIGNIFICANT CHANGE UP (ref 80–100)
PLATELET # BLD AUTO: 186 K/UL — SIGNIFICANT CHANGE UP (ref 150–400)
POTASSIUM SERPL-MCNC: 3.7 MMOL/L — SIGNIFICANT CHANGE UP (ref 3.5–5.3)
POTASSIUM SERPL-SCNC: 3.7 MMOL/L — SIGNIFICANT CHANGE UP (ref 3.5–5.3)
PROT SERPL-MCNC: 6.2 G/DL — LOW (ref 6.6–8.7)
PROTHROM AB SERPL-ACNC: 12.3 SEC — SIGNIFICANT CHANGE UP (ref 10–12.9)
RBC # BLD: 3.67 M/UL — LOW (ref 3.8–5.2)
RBC # FLD: 14.6 % — HIGH (ref 10.3–14.5)
SODIUM SERPL-SCNC: 144 MMOL/L — SIGNIFICANT CHANGE UP (ref 135–145)
TSH SERPL-MCNC: 10.92 UIU/ML — HIGH (ref 0.27–4.2)
WBC # BLD: 14.29 K/UL — HIGH (ref 3.8–10.5)
WBC # FLD AUTO: 14.29 K/UL — HIGH (ref 3.8–10.5)

## 2020-04-09 PROCEDURE — 71045 X-RAY EXAM CHEST 1 VIEW: CPT | Mod: 26

## 2020-04-09 PROCEDURE — 99233 SBSQ HOSP IP/OBS HIGH 50: CPT

## 2020-04-09 PROCEDURE — 99232 SBSQ HOSP IP/OBS MODERATE 35: CPT

## 2020-04-09 PROCEDURE — 71045 X-RAY EXAM CHEST 1 VIEW: CPT | Mod: 26,77

## 2020-04-09 RX ORDER — ALBUTEROL 90 UG/1
2 AEROSOL, METERED ORAL EVERY 6 HOURS
Refills: 0 | Status: DISCONTINUED | OUTPATIENT
Start: 2020-04-09 | End: 2020-04-12

## 2020-04-09 RX ORDER — TIOTROPIUM BROMIDE 18 UG/1
1 CAPSULE ORAL; RESPIRATORY (INHALATION) DAILY
Refills: 0 | Status: DISCONTINUED | OUTPATIENT
Start: 2020-04-09 | End: 2020-04-12

## 2020-04-09 RX ORDER — BUDESONIDE AND FORMOTEROL FUMARATE DIHYDRATE 160; 4.5 UG/1; UG/1
2 AEROSOL RESPIRATORY (INHALATION)
Refills: 0 | Status: DISCONTINUED | OUTPATIENT
Start: 2020-04-09 | End: 2020-04-12

## 2020-04-09 RX ADMIN — HEPARIN SODIUM 3000 UNIT(S): 5000 INJECTION INTRAVENOUS; SUBCUTANEOUS at 08:06

## 2020-04-09 RX ADMIN — Medication 50 MICROGRAM(S): at 22:42

## 2020-04-09 RX ADMIN — ALBUTEROL 2 PUFF(S): 90 AEROSOL, METERED ORAL at 17:33

## 2020-04-09 RX ADMIN — Medication 80 MILLIGRAM(S): at 07:40

## 2020-04-09 RX ADMIN — Medication 80 MILLIGRAM(S): at 17:33

## 2020-04-09 RX ADMIN — Medication 50 MICROGRAM(S): at 00:58

## 2020-04-09 RX ADMIN — HEPARIN SODIUM 1400 UNIT(S)/HR: 5000 INJECTION INTRAVENOUS; SUBCUTANEOUS at 23:34

## 2020-04-09 RX ADMIN — PANTOPRAZOLE SODIUM 40 MILLIGRAM(S): 20 TABLET, DELAYED RELEASE ORAL at 11:47

## 2020-04-09 RX ADMIN — HEPARIN SODIUM 1600 UNIT(S)/HR: 5000 INJECTION INTRAVENOUS; SUBCUTANEOUS at 08:01

## 2020-04-09 NOTE — PROGRESS NOTE BEHAVIORAL HEALTH - NSBHCONSULTFOLLOWDETAILS_PSY_A_CORE FT
plan for inpatient psychiatric admission once medically appropriate  will speak with family  I spoke with Zina yesterday afternoon
medical needs are primary concern at this time

## 2020-04-09 NOTE — PROGRESS NOTE BEHAVIORAL HEALTH - NSBHCHARTREVIEWIMAGING_PSY_A_CORE FT
< from: Xray Chest 1 View- PORTABLE-Routine (04.09.20 @ 07:47) >    Findings:  Small opacity in the left lateral costophrenic angle, unchanged. The lungs are otherwise clear. The heart is not enlarged. No hilar or mediastinal abnormality. No evidence of pneumothorax. No change in nasogastric tube..    Impression:  Stable exam without significant change since the previous study..    < end of copied text >
< from: Xray Chest 1 View- PORTABLE-Routine (04.08.20 @ 05:11) >    COMPARISON: 4.7.20.    FINDINGS:     Cardiac silhouette normal in size. Unchanged left basilar opacity. No pleural effusion or pneumothorax. Enteric tube tip below diaphragm.    IMPRESSION:   Unchanged left basilar opacity    < end of copied text >

## 2020-04-09 NOTE — PROGRESS NOTE ADULT - SUBJECTIVE AND OBJECTIVE BOX
Interval History: 74 year old female with CC of respiratory failure and seizure     Subjective: Patient seen and evaluated at bedside, patient is still lethargic but answers questions. Patient is on 2 L NC, sating 95%. ROS neg at this time.      PHYSICAL EXAM:    Vital Signs Last 24 Hrs  T(C): 37 (09 Apr 2020 09:48), Max: 37.1 (08 Apr 2020 18:34)  T(F): 98.6 (09 Apr 2020 09:48), Max: 98.8 (08 Apr 2020 18:34)  HR: 80 (09 Apr 2020 09:48) (61 - 80)  BP: 160/78 (09 Apr 2020 09:48) (127/70 - 180/76)  BP(mean): 82 (08 Apr 2020 16:00) (82 - 82)  RR: 18 (09 Apr 2020 09:48) (18 - 21)  SpO2: 95% (09 Apr 2020 09:48) (95% - 98%)    GENERAL: Pt lying comfortably, NAD.  ENMT: PERRL, +EOMI.  NECK: soft, Supple, No JVD,   CHEST/LUNG: Good air flow, b/l wheezing noted.   HEART: S1S2+, Regular rate and rhythm; No murmurs.  ABDOMEN: Soft, Nontender, Nondistended; Bowel sounds present.  MUSCULOSKELETAL: unable to assess  SKIN: warm and dry  NEURO: AAOX2.   PSYCH: normal mood.    LABS:                        11.4   14.29 )-----------( 186      ( 09 Apr 2020 06:10 )             35.8     04-09    144  |  103  |  40.0<H>  ----------------------------<  136<H>  3.7   |  24.0  |  4.06<H>    Ca    9.2      09 Apr 2020 06:10  Mg     2.2     04-09    TPro  6.2<L>  /  Alb  3.0<L>  /  TBili  0.3<L>  /  DBili  x   /  AST  36<H>  /  ALT  101<H>  /  AlkPhos  92  04-09    PT/INR - ( 09 Apr 2020 06:10 )   PT: 12.3 sec;   INR: 1.09 ratio         PTT - ( 09 Apr 2020 06:10 )  PTT:43.1 sec    LIVER FUNCTIONS - ( 09 Apr 2020 06:10 )  Alb: 3.0 g/dL / Pro: 6.2 g/dL / ALK PHOS: 92 U/L / ALT: 101 U/L / AST: 36 U/L / GGT: x               < from: Xray Chest 1 View- PORTABLE-Routine (04.09.20 @ 07:47) >    Findings:  Small opacity in the left lateral costophrenic angle, unchanged. The lungs are otherwise clear. The heart is not enlarged. No hilar or mediastinal abnormality. No evidence ofpneumothorax. No change in nasogastric tube..    Impression:  Stable exam without significant change since the previous study..    < end of copied text >

## 2020-04-09 NOTE — PROGRESS NOTE ADULT - ASSESSMENT
Patient with h/o COPD with current evidence of wheezing.  Renal failure    Plan:  1.Instituted Symbicort  2.Spiriva  3.Albuterol via spacer  4.For dialysis today per renal.   5.Mobilize patient as able

## 2020-04-09 NOTE — PROGRESS NOTE BEHAVIORAL HEALTH - NSBHFUPINTERVALHXFT_PSY_A_CORE
remains confused and disorganized drowsy but is able to respond to questions Answers are largely irrelevant  for example when asked who  is Mayra (her adult daughter ) she responded "a baby" She could not name her son (Marlon)  attempts to reconstruct events leading to her admission lead to perplexity and disorganization

## 2020-04-09 NOTE — PHYSICAL THERAPY INITIAL EVALUATION ADULT - ADDITIONAL COMMENTS
Pt was a poor historian, lethargic throughout evaluation closing her eyes and conversing minimally with PT.

## 2020-04-09 NOTE — CHART NOTE - NSCHARTNOTEFT_GEN_A_CORE
Source: Patient [ ]  Family [ ]   other [ x]    Current Diet: Diet, NPO with Tube Feed:   Tube Feeding Modality: Nasogastric  Nepro  Total Volume for 24 Hours (mL): 1200  Continuous  Starting Tube Feed Rate {mL per Hour}: 20  Increase Tube Feed Rate by (mL): 10     Every 4 hours  Until Goal Tube Feed Rate (mL per Hour): 50  Tube Feed Duration (in Hours): 24  Tube Feed Start Time: 14:30 (04-07-20 @ 14:24)    Enteral /Parenteral Nutrition: Nepro at goal rate of 50 ml/hr (x20 hrs) providing 1000 ml, 1800 kcal, 81g protein, 727 ml free water. Additional free water per MD discretion.     Current Weight:   (4/6) 191.1#  (3/31) 179.8#  -Monitor wts. b/l arm 2+ edema noted.     % Weight Change     Pertinent Medications: MEDICATIONS  (STANDING):  ALBUTerol    90 MICROgram(s) HFA Inhaler 2 Puff(s) Inhalation every 6 hours  furosemide    Tablet 80 milliGRAM(s) Oral two times a day  heparin  Infusion.  Unit(s)/Hr (14 mL/Hr) IV Continuous <Continuous>  ipratropium 17 MICROgram(s) HFA Inhaler 1 Puff(s) Inhalation every 6 hours  levothyroxine Injectable 50 MICROGram(s) IV Push at bedtime  pantoprazole  Injectable 40 milliGRAM(s) IV Push daily  psyllium Powder 1 Packet(s) Oral two times a day    MEDICATIONS  (PRN):  ALBUTerol    90 MICROgram(s) HFA Inhaler 1 Puff(s) Inhalation every 4 hours PRN Bronchospasm  heparin  Injectable 6500 Unit(s) IV Push every 6 hours PRN For aPTT less than 40  heparin  Injectable 3000 Unit(s) IV Push every 6 hours PRN For aPTT between 40 - 57    Pertinent Labs: CBC Full  -  ( 09 Apr 2020 06:10 )  WBC Count : 14.29 K/uL  RBC Count : 3.67 M/uL  Hemoglobin : 11.4 g/dL  Hematocrit : 35.8 %  Platelet Count - Automated : 186 K/uL  Mean Cell Volume : 97.5 fl  Mean Cell Hemoglobin : 31.1 pg  Mean Cell Hemoglobin Concentration : 31.8 gm/dL  Auto Neutrophil # : x  Auto Lymphocyte # : x  Auto Monocyte # : x  Auto Eosinophil # : x  Auto Basophil # : x  Auto Neutrophil % : x  Auto Lymphocyte % : x  Auto Monocyte % : x  Auto Eosinophil % : x  Auto Basophil % : x  04-09 Na144 mmol/L Glu 136 mg/dL<H> K+ 3.7 mmol/L Cr  4.06 mg/dL<H> BUN 40.0 mg/dL<H> Phos n/a   Alb 3.0 g/dL<L> PAB n/a       Skin: R heel blister      Estimated Needs:   [x ] no change since previous assessment  [ ] recalculated:     Current Nutrition Diagnosis: Pt remains at high nutrition risk secondary to moderate protein calorie malnutrition related to inability to meet sufficient energy requirements in setting of Acute Hypoxic respiratory failure related to Intentional Narcotic Overdose, now with renal failure requiring HD (pt now extubated, remains obtunded) as evidenced by meeting less then 75% estimated energy requirements > 5 days and +fluid accumulation. Pt receiving Nepro at goal rate of 50ml/hr per I&Os. Pt tolerating EN per documentation. Per SLP rx 4/8, pt not appropriate for po diet at this time. Last documented BM 4/7. Pt A&Ox1 Aware of GOC conversation. RD to remain available.     Recommendations:   1) Continue EN as ordered, monitor tolerance  2) Rx: Nephro-yovany daily   3) Monitor wts and labs    Monitoring and Evaluation:   [ ] PO intake [x ] Tolerance to diet prescription [X] Weights  [X] Follow up per protocol [X] Labs:

## 2020-04-09 NOTE — PHYSICAL THERAPY INITIAL EVALUATION ADULT - PERTINENT HX OF CURRENT PROBLEM, REHAB EVAL
current smoker w/ PMHx COPD, lung nodule s/p wedge resection, hypothyroid was BIBEMS with altered mental status. Admitted for suicidal attempt 2/2 opoid overdose (fentanyl patch) and subsequent withdrawal seizure complicated by hypoxic respiratory failure requiring intubation. Now s/p extubation

## 2020-04-09 NOTE — PHYSICAL THERAPY INITIAL EVALUATION ADULT - GENERAL OBSERVATIONS, REHAB EVAL
Pt received in bed, + IV +Tele, + NC O2, +NG Tube, + mcneill, 1:1 present, in NAD, lethargic, in 0/10 pain, agreeable to PT evaluation

## 2020-04-09 NOTE — PROGRESS NOTE BEHAVIORAL HEALTH - NSBHCONSULTMEDS_PSY_A_CORE FT
defer until medical condition improves
would not start as yet until sensorium and swallowing evaluation completed

## 2020-04-09 NOTE — PROGRESS NOTE BEHAVIORAL HEALTH - NSBHCHARTREVIEWLAB_PSY_A_CORE FT
11.4   14.29 )-----------( 186      ( 09 Apr 2020 06:10 )             35.8     04-09    144  |  103  |  40.0<H>  ----------------------------<  136<H>  3.7   |  24.0  |  4.06<H>    Ca    9.2      09 Apr 2020 06:10  Mg     2.2     04-09    TPro  6.2<L>  /  Alb  3.0<L>  /  TBili  0.3<L>  /  DBili  x   /  AST  36<H>  /  ALT  101<H>  /  AlkPhos  92  04-09    LIVER FUNCTIONS - ( 09 Apr 2020 06:10 )  Alb: 3.0 g/dL / Pro: 6.2 g/dL / ALK PHOS: 92 U/L / ALT: 101 U/L / AST: 36 U/L / GGT: x           PT/INR - ( 09 Apr 2020 06:10 )   PT: 12.3 sec;   INR: 1.09 ratio         PTT - ( 09 Apr 2020 06:10 )  PTT:43.1 sec
11.9   12.21 )-----------( 179      ( 08 Apr 2020 08:57 )             35.9     04-08    144  |  103  |  52.0<H>  ----------------------------<  127<H>  3.9   |  21.0<L>  |  5.51<H>    Ca    9.0      08 Apr 2020 08:57  Mg     2.4     04-08    TPro  5.7<L>  /  Alb  2.9<L>  /  TBili  0.4  /  DBili  x   /  AST  32<H>  /  ALT  135<H>  /  AlkPhos  81  04-07    LIVER FUNCTIONS - ( 07 Apr 2020 08:55 )  Alb: 2.9 g/dL / Pro: 5.7 g/dL / ALK PHOS: 81 U/L / ALT: 135 U/L / AST: 32 U/L / GGT: x           PTT - ( 08 Apr 2020 10:09 )  PTT:77.6 sec

## 2020-04-09 NOTE — PROGRESS NOTE ADULT - SUBJECTIVE AND OBJECTIVE BOX
[facility] DIVISION OF KIDNEY DISEASES AND HYPERTENSION -- FOLLOW UP NOTE  --------------------------------------------------------------------------------  Chief Complaint: SUSAN /Ongoing hemodialysis requirement    24 hour events/subjective:        PAST HISTORY  --------------------------------------------------------------------------------  No significant changes to PMH, PSH, FHx, SHx, unless otherwise noted    ALLERGIES & MEDICATIONS  --------------------------------------------------------------------------------  Allergies  No Known Allergies    Standing Inpatient Medications  ALBUTerol    90 MICROgram(s) HFA Inhaler 2 Puff(s) Inhalation every 6 hours  furosemide    Tablet 80 milliGRAM(s) Oral two times a day  heparin  Infusion.  Unit(s)/Hr IV Continuous <Continuous>  ipratropium 17 MICROgram(s) HFA Inhaler 1 Puff(s) Inhalation every 6 hours  levothyroxine Injectable 50 MICROGram(s) IV Push at bedtime  pantoprazole  Injectable 40 milliGRAM(s) IV Push daily  psyllium Powder 1 Packet(s) Oral two times a day    PRN Inpatient Medications  ALBUTerol    90 MICROgram(s) HFA Inhaler 1 Puff(s) Inhalation every 4 hours PRN  heparin  Injectable 6500 Unit(s) IV Push every 6 hours PRN  heparin  Injectable 3000 Unit(s) IV Push every 6 hours PRN    REVIEW OF SYSTEMS  --------------------------------------------------------------------------------  Gen: No weight changes, fatigue, fevers/chills, weakness  Skin: No rashes  Head/Eyes/Ears/Mouth: No headache  Respiratory: No dyspnea, cough,  CV: No chest pain, orthopnea  GI: No abdominal pain, diarrhea, constipation, nausea, vomiting,  MSK: No joint pain  Neuro: No dizziness/lightheadedness, weakness  Heme: No bleeding  Psych: No significant nervousness, anxiety, stress, depression    All other systems were reviewed and are negative, except as noted.    VITALS/PHYSICAL EXAM  --------------------------------------------------------------------------------  T(C): 37 (04-09-20 @ 09:48), Max: 37.1 (04-08-20 @ 18:34)  HR: 80 (04-09-20 @ 09:48) (61 - 80)  BP: 160/78 (04-09-20 @ 09:48) (127/70 - 180/76)  RR: 18 (04-09-20 @ 09:48) (18 - 21)  SpO2: 95% (04-09-20 @ 09:48) (94% - 98%)    04-08-20 @ 07:01  -  04-09-20 @ 07:00  --------------------------------------------------------  IN: 1126 mL / OUT: 1825 mL / NET: -699 mL    Physical Exam:  	Gen: NAD, well-appearing  	HEENT: supple neck  	Pulm: CTA B/L  	CV: RRR, S1S2; no rub  	Abd: +BS, soft, nontender  	UE: Warm, no asterixis  	LE: Warm, + edema  	Neuro: No focal deficits  	Psych: Normal affect and mood  	Skin: Warm, without rashes  	Vascular access: Right femoral catheter    LABS/STUDIES  --------------------------------------------------------------------------------              11.4   14.29 >-----------<  186      [04-09-20 @ 06:10]              35.8     144  |  103  |  40.0  ----------------------------<  136      [04-09-20 @ 06:10]  3.7   |  24.0  |  4.06        Ca     9.2     [04-09-20 @ 06:10]      Mg     2.2     [04-09-20 @ 06:10]    TPro  6.2  /  Alb  3.0  /  TBili  0.3  /  DBili  x   /  AST  36  /  ALT  101  /  AlkPhos  92  [04-09-20 @ 06:10]    PT/INR: PT 12.3 , INR 1.09       [04-09-20 @ 06:10]  PTT: 43.1       [04-09-20 @ 06:10]    Creatinine Trend:  SCr 4.06 [04-09 @ 06:10]  SCr 5.51 [04-08 @ 08:57]  SCr 5.06 [04-07 @ 08:55]  SCr 5.30 [04-06 @ 02:05]  SCr 4.34 [04-05 @ 03:56]    Urinalysis - [04-01-20 @ 06:36]      Color Yellow / Appearance Slightly Turbid / SG 1.020 / pH 5.0      Gluc Negative / Ketone Trace  / Bili Negative / Urobili 1       Blood Large / Protein 100 / Leuk Est Trace / Nitrite Negative      RBC 11-25 / WBC 3-5 / Hyaline  / Gran  / Sq Epi  / Non Sq Epi Occasional / Bacteria Moderate    Ferritin 3576      [03-30-20 @ 18:51]  HbA1c 5.7      [04-03-20 @ 03:44]  TSH 1.97      [04-04-20 @ 03:57]  Lipid: chol 106, , HDL 31, LDL 27      [04-01-20 @ 02:49]    HBsAb Nonreact      [04-01-20 @ 07:33]  HBsAg Nonreact      [04-01-20 @ 07:33]  HCV 0.18, Nonreact      [04-01-20 @ 07:33]  HIV Nonreact      [04-01-20 @ 02:49] [facility] DIVISION OF KIDNEY DISEASES AND HYPERTENSION -- FOLLOW UP NOTE  --------------------------------------------------------------------------------  Chief Complaint: SUSAN /Ongoing hemodialysis requirement    24 hour events/subjective:  s/p HD yesterday with 1.1 L UF  UOP~ 825 ml      PAST HISTORY  --------------------------------------------------------------------------------  No significant changes to PMH, PSH, FHx, SHx, unless otherwise noted    ALLERGIES & MEDICATIONS  --------------------------------------------------------------------------------  Allergies  No Known Allergies    Standing Inpatient Medications  ALBUTerol    90 MICROgram(s) HFA Inhaler 2 Puff(s) Inhalation every 6 hours  furosemide    Tablet 80 milliGRAM(s) Oral two times a day  heparin  Infusion.  Unit(s)/Hr IV Continuous <Continuous>  ipratropium 17 MICROgram(s) HFA Inhaler 1 Puff(s) Inhalation every 6 hours  levothyroxine Injectable 50 MICROGram(s) IV Push at bedtime  pantoprazole  Injectable 40 milliGRAM(s) IV Push daily  psyllium Powder 1 Packet(s) Oral two times a day    PRN Inpatient Medications  ALBUTerol    90 MICROgram(s) HFA Inhaler 1 Puff(s) Inhalation every 4 hours PRN  heparin  Injectable 6500 Unit(s) IV Push every 6 hours PRN  heparin  Injectable 3000 Unit(s) IV Push every 6 hours PRN    REVIEW OF SYSTEMS  --------------------------------------------------------------------------------  Gen: No weight changes, fatigue, fevers/chills, weakness  Skin: No rashes  Head/Eyes/Ears/Mouth: No headache  Respiratory: No dyspnea, cough,  CV: No chest pain, orthopnea  GI: No abdominal pain, diarrhea, constipation, nausea, vomiting,  MSK: No joint pain  Neuro: No dizziness/lightheadedness, weakness  Heme: No bleeding  Psych: No significant nervousness, anxiety, stress, depression    All other systems were reviewed and are negative, except as noted.    VITALS/PHYSICAL EXAM  --------------------------------------------------------------------------------  T(C): 37 (04-09-20 @ 09:48), Max: 37.1 (04-08-20 @ 18:34)  HR: 80 (04-09-20 @ 09:48) (61 - 80)  BP: 160/78 (04-09-20 @ 09:48) (127/70 - 180/76)  RR: 18 (04-09-20 @ 09:48) (18 - 21)  SpO2: 95% (04-09-20 @ 09:48) (94% - 98%)    04-08-20 @ 07:01  -  04-09-20 @ 07:00  --------------------------------------------------------  IN: 1126 mL / OUT: 1825 mL / NET: -699 mL    Physical Exam:  	Gen: NAD, well-appearing  	HEENT: supple neck  	Pulm: CTA B/L  	CV: RRR, S1S2; no rub  	Abd: +BS, soft, nontender  	UE: Warm, no asterixis  	LE: Warm, + edema  	Neuro: No focal deficits  	Psych: Normal affect and mood  	Skin: Warm, without rashes  	Vascular access: Right femoral catheter    LABS/STUDIES  --------------------------------------------------------------------------------              11.4   14.29 >-----------<  186      [04-09-20 @ 06:10]              35.8     144  |  103  |  40.0  ----------------------------<  136      [04-09-20 @ 06:10]  3.7   |  24.0  |  4.06        Ca     9.2     [04-09-20 @ 06:10]      Mg     2.2     [04-09-20 @ 06:10]    TPro  6.2  /  Alb  3.0  /  TBili  0.3  /  DBili  x   /  AST  36  /  ALT  101  /  AlkPhos  92  [04-09-20 @ 06:10]    PT/INR: PT 12.3 , INR 1.09       [04-09-20 @ 06:10]  PTT: 43.1       [04-09-20 @ 06:10]    Creatinine Trend:  SCr 4.06 [04-09 @ 06:10]  SCr 5.51 [04-08 @ 08:57]  SCr 5.06 [04-07 @ 08:55]  SCr 5.30 [04-06 @ 02:05]  SCr 4.34 [04-05 @ 03:56]    Urinalysis - [04-01-20 @ 06:36]      Color Yellow / Appearance Slightly Turbid / SG 1.020 / pH 5.0      Gluc Negative / Ketone Trace  / Bili Negative / Urobili 1       Blood Large / Protein 100 / Leuk Est Trace / Nitrite Negative      RBC 11-25 / WBC 3-5 / Hyaline  / Gran  / Sq Epi  / Non Sq Epi Occasional / Bacteria Moderate    Ferritin 3576      [03-30-20 @ 18:51]  HbA1c 5.7      [04-03-20 @ 03:44]  TSH 1.97      [04-04-20 @ 03:57]  Lipid: chol 106, , HDL 31, LDL 27      [04-01-20 @ 02:49]    HBsAb Nonreact      [04-01-20 @ 07:33]  HBsAg Nonreact      [04-01-20 @ 07:33]  HCV 0.18, Nonreact      [04-01-20 @ 07:33]  HIV Nonreact      [04-01-20 @ 02:49]

## 2020-04-09 NOTE — PHYSICAL THERAPY INITIAL EVALUATION ADULT - MANUAL MUSCLE TESTING RESULTS, REHAB EVAL
Bilateral LE grossly 3-/5; difficult to formally assess due to pt's lethargy and lack of participation

## 2020-04-09 NOTE — PROGRESS NOTE ADULT - ATTENDING COMMENTS
I have personally seen and examined patient.  Above note reviewed and discussed with PA, modified where appropriate. Pt appears lethargic. Will do ABG, TSH, B12, folate, RPR< ammonia. CT head on admission was -ve. Not on any sedatives. NPO on tube feeds. S & S eval called. B/l UE DVT- Continue hep gtt, no plans for placing HD cath per renal but final decision to be made tomorrow, hold off on coumadin as per Dr. Pearce. Elevated WBC. Afebrile. CXR with L opacity. Will monitor. Check UA

## 2020-04-09 NOTE — PROGRESS NOTE ADULT - ASSESSMENT
Acute Hypoxic respiratory failure related to Intentional Narcotic Overdose (Suicide attempt with fentanyl)  Post arrest encephalopathy  anemia    Acute Tubular Necrosis, now on HD  Bailey removed, Bladder scan q 12 hrs w/ straight cath  HD today  Hb at goal- monitor H/H Acute Hypoxic respiratory failure related to Intentional Narcotic Overdose (Suicide attempt with fentanyl)  Post arrest encephalopathy  anemia    Acute Tubular Necrosis, now on HD- last HD 04/08  Bailey removed, Bladder scan q 12 hrs w/ straight cath  Hb at goal- monitor H/H  Will asses for HD need tomorrow.

## 2020-04-09 NOTE — PROGRESS NOTE BEHAVIORAL HEALTH - NSBHCONSULTOBSREASON_PSY_A_CORE FT
given her current physical status I do not feel she is an immediate suicidal risk This is subject to re-evaluation as her sensorium improves
in critical care unit

## 2020-04-09 NOTE — PROGRESS NOTE ADULT - ASSESSMENT
75 y/o F current smoker w/ PMHx COPD, lung nodule s/p wedge resection, hypothyroid was BIBEMS with altered mental status. Admitted for suicidal attempt 2/2 opoid overdose (fentanyl patch) and subsequent withdrawal seizure complicated by hypoxic respiratory failure requiring intubation. Now s/p extubation and being transferred to the medical floor      Hypoxic respiratory failure 2/2 suicidal attempt 2/2 opoid overdose (fentanyl patch) requiring intubation.  Now extubated, currently on 2L NC sating 95%.   Diet: NPO with tube feeds. Will reassess when patient is more awake and oriented  VS Q 4 hrs  Activity as tolerated  Psych following.   Antibiotics discontinued due to Blood Cultures 3/30 and 4/1 Negative to date.  Leukocytosis of 14.29 noted, will continue to monitor.  Patient is afebrile.   CXR showed small opacity in the left lateral costophrenic angle, unchanged. The lungs are otherwise clear.    Suicical ideation-  c/w 1:1  psych assessment prior to d/c      Seizure disorder  s/p Ativan in the Ed with good response  Keppra started for seizures prevention now being titrated down as per neuro suggestions  no further seizures  If any further seizure would consider Vimpat or valproic acid per neuro    B/l UE DVT  Continue hep GGT     ATN requiring HD.   HD today  Nephro following  Avoid nephrotoxic medications  Bailey removed reinserted.     Hypothyroidism  C/w synthroid    COPD  Not acute exacerbation  C/w albuterol/ipratropium inhaler    PPX  DVT: patient already on full a/c  gastritis: omeprazole    CODE STATUS: DNR as per discussion with son/daughter and palliative team 75 y/o F current smoker w/ PMHx COPD, lung nodule s/p wedge resection, hypothyroid was BIBEMS with altered mental status. Admitted for suicidal attempt 2/2 opoid overdose (fentanyl patch) and subsequent withdrawal seizure complicated by hypoxic respiratory failure requiring intubation. Now s/p extubation and being transferred to the medical floor      Hypoxic respiratory failure 2/2 suicidal attempt 2/2 opoid overdose (fentanyl patch) requiring intubation.  Now extubated, currently on 2L NC sating 95%.   Diet: NPO with tube feeds, NG placed 4/3.   VS Q 4 hrs  Activity as tolerated  Psych following.   Antibiotics discontinued due to Blood Cultures 3/30 and 4/1 Negative to date.  Leukocytosis of 14.29 noted, will continue to monitor.  Patient is afebrile.   CXR showed small opacity in the left lateral costophrenic angle, unchanged. The lungs are otherwise clear.    Suicical ideation-  c/w 1:1  psych assessment prior to d/c      Seizure disorder  s/p Ativan in the Ed with good response  Keppra started for seizures prevention now being titrated down as per neuro suggestions  no further seizures  If any further seizure would consider Vimpat or valproic acid per neuro    B/l UE DVT  Continue hep GGT     ATN requiring HD.   No HD, femoral cath removed yesterday  Nephro following  Avoid nephrotoxic medications  Bailey removed reinserted.     Hypothyroidism  C/w synthroid    COPD  Not acute exacerbation  C/w albuterol/ipratropium inhaler    PPX  DVT: patient already on full a/c  gastritis: omeprazole    CODE STATUS: DNR as per discussion with son/daughter and palliative team 73 y/o F current smoker w/ PMHx COPD, lung nodule s/p wedge resection, hypothyroid was BIBEMS with altered mental status. Admitted for suicidal attempt 2/2 opoid overdose (fentanyl patch) and subsequent withdrawal seizure complicated by hypoxic respiratory failure requiring intubation. Now s/p extubation and being transferred to the medical floor      Hypoxic respiratory failure 2/2 suicidal attempt 2/2 opoid overdose (fentanyl patch) requiring intubation.  Now extubated, currently on 2L NC sating 95%.   VS Q 4 hrs  Activity as tolerated  Psych following.   Antibiotics discontinued due to Blood Cultures 3/30 and 4/1 Negative to date.  Leukocytosis of 14.29 noted, will continue to monitor.  Patient is afebrile.   CXR showed small opacity in the left lateral costophrenic angle, unchanged. The lungs are otherwise clear.    Dysphagia-   Diet: NPO with tube feeds, NG placed 4/3.   Speech & Swallow eval called      Suicical ideation-  c/w 1:1  psych inpateint on d/c when medically ready      Seizure disorder  s/p Ativan in the Ed with good response  Keppra started for seizures prevention now being titrated down as per neuro suggestions  no further seizures  If any further seizure would consider Vimpat or valproic acid per neuro    B/l UE DVT  Continue hep gtt, no plans for placing HD cath per renal but final decision to be made tomorrow, hold off on coumadin as per Dr. Ramses CAMACHO requiring HD-  Now in the polyuric phase  No HD for now, femoral cath removed yesterday  Nephro following  Avoid nephrotoxic medications  Bailey removed reinserted.   no plans for placing HD cath per renal but final decision to be made tomorrow, hold off on coumadin as per Dr. Pearce      Hypothyroidism  C/w synthroid    COPD  Not acute exacerbation  C/w albuterol/ipratropium inhaler    PPX  DVT: patient already on full a/c  gastritis: omeprazole    CODE STATUS: DNR as per discussion with son/daughter and palliative team

## 2020-04-09 NOTE — PROGRESS NOTE ADULT - SUBJECTIVE AND OBJECTIVE BOX
PULMONARY PROGRESS NOTE      JOSE M KEARNEYBRITTANI-[id number]    Patient is a 74y old  Female who presents with a chief complaint of Resp failure and seizure (2020 12:26)      INTERVAL HPI/OVERNIGHT EVENTS:  Appears more awake and alert    MEDICATIONS  (STANDING):  ALBUTerol    90 MICROgram(s) HFA Inhaler 2 Puff(s) Inhalation every 6 hours  budesonide 160 MICROgram(s)/formoterol 4.5 MICROgram(s) Inhaler 2 Puff(s) Inhalation two times a day  furosemide    Tablet 80 milliGRAM(s) Oral two times a day  heparin  Infusion.  Unit(s)/Hr (14 mL/Hr) IV Continuous <Continuous>  levothyroxine Injectable 50 MICROGram(s) IV Push at bedtime  pantoprazole  Injectable 40 milliGRAM(s) IV Push daily  psyllium Powder 1 Packet(s) Oral two times a day  tiotropium 18 MICROgram(s) Capsule 1 Capsule(s) Inhalation daily      MEDICATIONS  (PRN):  heparin  Injectable 6500 Unit(s) IV Push every 6 hours PRN For aPTT less than 40  heparin  Injectable 3000 Unit(s) IV Push every 6 hours PRN For aPTT between 40 - 57      Allergies    No Known Allergies    Intolerances        PAST MEDICAL & SURGICAL HISTORY:  Lung nodule  COPD (chronic obstructive pulmonary disease)  Hyperlipidemia  Hypothyroid  Chronic knee pain  S/P hysterectomy  S/P  section      SOCIAL HISTORY  Smoking History:       REVIEW OF SYSTEMS:    CONSTITUTIONAL:  No distress    HEENT:  Eyes:  No diplopia or blurred vision. ENT:  No earache, sore throat or runny nose.    CARDIOVASCULAR:  No pressure, squeezing, tightness, heaviness or aching about the chest; no palpitations.    RESPIRATORY:  No cough, shortness of breath, PND or orthopnea. Mild SOBOE    GASTROINTESTINAL:  No nausea, vomiting or diarrhea.    GENITOURINARY:  No dysuria, frequency or urgency.    MUSCULOSKELETAL:  No joint pain    SKIN:  No new lesions.    NEUROLOGIC:  No paresthesias, fasciculations, seizures or weakness.    PSYCHIATRIC:  No disorder of thought or mood.    ENDOCRINE:  No heat or cold intolerance, polyuria or polydipsia.    HEMATOLOGICAL:  No easy bruising or bleeding.     Vital Signs Last 24 Hrs  T(C): 37 (2020 09:48), Max: 37.1 (2020 18:34)  T(F): 98.6 (2020 09:48), Max: 98.8 (2020 18:34)  HR: 80 (2020 09:48) (61 - 80)  BP: 160/78 (2020 09:48) (127/70 - 180/76)  BP(mean): 82 (2020 16:00) (82 - 82)  RR: 18 (2020 09:48) (18 - 21)  SpO2: 95% (2020 09:48) (95% - 98%)    PHYSICAL EXAMINATION:    GENERAL: The patient is awake and alert in no apparent distress.     HEENT: Head is normocephalic and atraumatic. Extraocular muscles are intact. Mucous membranes are moist.    NECK: Supple.    LUNGS: Expiratory wheeze at end exhalation; scattered rhonchi    HEART: Regular rate and rhythm without murmur.    ABDOMEN: Soft, nontender, and nondistended.      EXTREMITIES: Without any cyanosis, clubbing, rash, lesions or edema.    NEUROLOGIC: Grossly intact.    SKIN: No ulceration or induration present.      LABS:                        11.4   14.29 )-----------( 186      ( 2020 06:10 )             35.8     04-09    144  |  103  |  40.0<H>  ----------------------------<  136<H>  3.7   |  24.0  |  4.06<H>    Ca    9.2      2020 06:10  Mg     2.2     04-09    TPro  6.2<L>  /  Alb  3.0<L>  /  TBili  0.3<L>  /  DBili  x   /  AST  36<H>  /  ALT  101<H>  /  AlkPhos  92  04-09    PT/INR - ( 2020 06:10 )   PT: 12.3 sec;   INR: 1.09 ratio         PTT - ( 2020 06:10 )  PTT:43.1 sec                    MICROBIOLOGY:    RADIOLOGY & ADDITIONAL STUDIES:

## 2020-04-09 NOTE — PROGRESS NOTE BEHAVIORAL HEALTH - NSBHCHARTREVIEWVS_PSY_A_CORE FT
Vital Signs Last 24 Hrs  T(C): 37 (09 Apr 2020 09:48), Max: 37.1 (08 Apr 2020 11:45)  T(F): 98.6 (09 Apr 2020 09:48), Max: 98.8 (08 Apr 2020 11:45)  HR: 80 (09 Apr 2020 09:48) (61 - 80)  BP: 160/78 (09 Apr 2020 09:48) (110/57 - 180/76)  BP(mean): 82 (08 Apr 2020 16:00) (79 - 82)  RR: 18 (09 Apr 2020 09:48) (18 - 21)  SpO2: 95% (09 Apr 2020 09:48) (94% - 98%)
Vital Signs Last 24 Hrs  T(C): 37.1 (08 Apr 2020 11:45), Max: 37.2 (08 Apr 2020 09:41)  T(F): 98.8 (08 Apr 2020 11:45), Max: 98.9 (08 Apr 2020 09:41)  HR: 68 (08 Apr 2020 13:00) (65 - 71)  BP: 128/62 (08 Apr 2020 13:00) (110/57 - 150/65)  BP(mean): 79 (08 Apr 2020 13:00) (78 - 90)  RR: 21 (08 Apr 2020 13:00) (18 - 21)  SpO2: 94% (08 Apr 2020 13:00) (93% - 97%)

## 2020-04-10 LAB
AMMONIA BLD-MCNC: 47 UMOL/L — SIGNIFICANT CHANGE UP (ref 11–55)
ANION GAP SERPL CALC-SCNC: 18 MMOL/L — HIGH (ref 5–17)
ANION GAP SERPL CALC-SCNC: 20 MMOL/L — HIGH (ref 5–17)
APPEARANCE UR: ABNORMAL
APTT BLD: 72.4 SEC — HIGH (ref 27.5–36.3)
APTT BLD: 93.9 SEC — HIGH (ref 27.5–36.3)
BACTERIA # UR AUTO: ABNORMAL
BASOPHILS # BLD AUTO: 0 K/UL — SIGNIFICANT CHANGE UP (ref 0–0.2)
BASOPHILS NFR BLD AUTO: 0 % — SIGNIFICANT CHANGE UP (ref 0–2)
BILIRUB UR-MCNC: NEGATIVE — SIGNIFICANT CHANGE UP
BUN SERPL-MCNC: 48 MG/DL — HIGH (ref 8–20)
BUN SERPL-MCNC: 50 MG/DL — HIGH (ref 8–20)
CALCIUM SERPL-MCNC: 8.4 MG/DL — LOW (ref 8.6–10.2)
CALCIUM SERPL-MCNC: 9.2 MG/DL — SIGNIFICANT CHANGE UP (ref 8.6–10.2)
CHLORIDE SERPL-SCNC: 100 MMOL/L — SIGNIFICANT CHANGE UP (ref 98–107)
CHLORIDE SERPL-SCNC: 94 MMOL/L — LOW (ref 98–107)
CO2 SERPL-SCNC: 22 MMOL/L — SIGNIFICANT CHANGE UP (ref 22–29)
CO2 SERPL-SCNC: 27 MMOL/L — SIGNIFICANT CHANGE UP (ref 22–29)
COLOR SPEC: YELLOW — SIGNIFICANT CHANGE UP
CREAT SERPL-MCNC: 3.82 MG/DL — HIGH (ref 0.5–1.3)
CREAT SERPL-MCNC: 4.03 MG/DL — HIGH (ref 0.5–1.3)
DIFF PNL FLD: ABNORMAL
EOSINOPHIL # BLD AUTO: 0 K/UL — SIGNIFICANT CHANGE UP (ref 0–0.5)
EOSINOPHIL NFR BLD AUTO: 0 % — SIGNIFICANT CHANGE UP (ref 0–6)
EPI CELLS # UR: SIGNIFICANT CHANGE UP
FOLATE SERPL-MCNC: 8.3 NG/ML — SIGNIFICANT CHANGE UP
GIANT PLATELETS BLD QL SMEAR: PRESENT — SIGNIFICANT CHANGE UP
GLUCOSE SERPL-MCNC: 207 MG/DL — HIGH (ref 70–99)
GLUCOSE SERPL-MCNC: 207 MG/DL — HIGH (ref 70–99)
GLUCOSE UR QL: NEGATIVE MG/DL — SIGNIFICANT CHANGE UP
HCT VFR BLD CALC: 29.1 % — LOW (ref 34.5–45)
HCT VFR BLD CALC: 30.5 % — LOW (ref 34.5–45)
HGB BLD-MCNC: 9.3 G/DL — LOW (ref 11.5–15.5)
HGB BLD-MCNC: 9.7 G/DL — LOW (ref 11.5–15.5)
HYPOCHROMIA BLD QL: SLIGHT — SIGNIFICANT CHANGE UP
KETONES UR-MCNC: NEGATIVE — SIGNIFICANT CHANGE UP
LEUKOCYTE ESTERASE UR-ACNC: ABNORMAL
LYMPHOCYTES # BLD AUTO: 0.22 K/UL — LOW (ref 1–3.3)
LYMPHOCYTES # BLD AUTO: 0.8 % — LOW (ref 13–44)
MAGNESIUM SERPL-MCNC: 1.7 MG/DL — SIGNIFICANT CHANGE UP (ref 1.6–2.6)
MAGNESIUM SERPL-MCNC: 1.8 MG/DL — SIGNIFICANT CHANGE UP (ref 1.8–2.6)
MANUAL SMEAR VERIFICATION: SIGNIFICANT CHANGE UP
MCHC RBC-ENTMCNC: 31.4 PG — SIGNIFICANT CHANGE UP (ref 27–34)
MCHC RBC-ENTMCNC: 31.5 PG — SIGNIFICANT CHANGE UP (ref 27–34)
MCHC RBC-ENTMCNC: 31.8 GM/DL — LOW (ref 32–36)
MCHC RBC-ENTMCNC: 32 GM/DL — SIGNIFICANT CHANGE UP (ref 32–36)
MCV RBC AUTO: 98.3 FL — SIGNIFICANT CHANGE UP (ref 80–100)
MCV RBC AUTO: 99 FL — SIGNIFICANT CHANGE UP (ref 80–100)
METAMYELOCYTES # FLD: 0.9 % — HIGH (ref 0–0)
MONOCYTES # BLD AUTO: 0 K/UL — SIGNIFICANT CHANGE UP (ref 0–0.9)
MONOCYTES NFR BLD AUTO: 0 % — LOW (ref 2–14)
MYELOCYTES NFR BLD: 0.9 % — HIGH (ref 0–0)
NEUTROPHILS # BLD AUTO: 26.94 K/UL — HIGH (ref 1.8–7.4)
NEUTROPHILS NFR BLD AUTO: 92.2 % — HIGH (ref 43–77)
NEUTS BAND # BLD: 4.3 % — SIGNIFICANT CHANGE UP (ref 0–8)
NITRITE UR-MCNC: NEGATIVE — SIGNIFICANT CHANGE UP
PH UR: 5 — SIGNIFICANT CHANGE UP (ref 5–8)
PHOSPHATE SERPL-MCNC: 5.4 MG/DL — HIGH (ref 2.4–4.7)
PHOSPHATE SERPL-MCNC: 5.5 MG/DL — HIGH (ref 2.4–4.7)
PLAT MORPH BLD: NORMAL — SIGNIFICANT CHANGE UP
PLATELET # BLD AUTO: 299 K/UL — SIGNIFICANT CHANGE UP (ref 150–400)
PLATELET # BLD AUTO: SIGNIFICANT CHANGE UP K/UL (ref 150–400)
POLYCHROMASIA BLD QL SMEAR: SIGNIFICANT CHANGE UP
POTASSIUM SERPL-MCNC: 3.4 MMOL/L — LOW (ref 3.5–5.3)
POTASSIUM SERPL-MCNC: 5.4 MMOL/L — HIGH (ref 3.5–5.3)
POTASSIUM SERPL-SCNC: 3.4 MMOL/L — LOW (ref 3.5–5.3)
POTASSIUM SERPL-SCNC: 5.4 MMOL/L — HIGH (ref 3.5–5.3)
PROCALCITONIN SERPL-MCNC: 0.25 NG/ML — HIGH (ref 0.02–0.1)
PROMYELOCYTES # FLD: 0.9 % — HIGH (ref 0–0)
PROT UR-MCNC: 15 MG/DL
RBC # BLD: 2.96 M/UL — LOW (ref 3.8–5.2)
RBC # BLD: 3.08 M/UL — LOW (ref 3.8–5.2)
RBC # FLD: 14.6 % — HIGH (ref 10.3–14.5)
RBC # FLD: 14.6 % — HIGH (ref 10.3–14.5)
RBC BLD AUTO: ABNORMAL
RBC CASTS # UR COMP ASSIST: ABNORMAL /HPF (ref 0–4)
SODIUM SERPL-SCNC: 136 MMOL/L — SIGNIFICANT CHANGE UP (ref 135–145)
SODIUM SERPL-SCNC: 145 MMOL/L — SIGNIFICANT CHANGE UP (ref 135–145)
SP GR SPEC: 1.01 — SIGNIFICANT CHANGE UP (ref 1.01–1.02)
T PALLIDUM AB TITR SER: NEGATIVE — SIGNIFICANT CHANGE UP
UROBILINOGEN FLD QL: NEGATIVE MG/DL — SIGNIFICANT CHANGE UP
VIT B12 SERPL-MCNC: 292 PG/ML — SIGNIFICANT CHANGE UP (ref 232–1245)
WBC # BLD: 27.92 K/UL — HIGH (ref 3.8–10.5)
WBC # BLD: 34.71 K/UL — HIGH (ref 3.8–10.5)
WBC # FLD AUTO: 27.92 K/UL — HIGH (ref 3.8–10.5)
WBC # FLD AUTO: 34.71 K/UL — HIGH (ref 3.8–10.5)
WBC UR QL: ABNORMAL

## 2020-04-10 PROCEDURE — 99233 SBSQ HOSP IP/OBS HIGH 50: CPT

## 2020-04-10 PROCEDURE — 99232 SBSQ HOSP IP/OBS MODERATE 35: CPT

## 2020-04-10 PROCEDURE — 99222 1ST HOSP IP/OBS MODERATE 55: CPT

## 2020-04-10 RX ORDER — ERYTHROPOIETIN 10000 [IU]/ML
8000 INJECTION, SOLUTION INTRAVENOUS; SUBCUTANEOUS
Refills: 0 | Status: DISCONTINUED | OUTPATIENT
Start: 2020-04-10 | End: 2020-04-12

## 2020-04-10 RX ORDER — SODIUM CHLORIDE 9 MG/ML
1000 INJECTION, SOLUTION INTRAVENOUS
Refills: 0 | Status: DISCONTINUED | OUTPATIENT
Start: 2020-04-10 | End: 2020-04-11

## 2020-04-10 RX ORDER — DEXTROSE MONOHYDRATE, SODIUM CHLORIDE, AND POTASSIUM CHLORIDE 50; .745; 4.5 G/1000ML; G/1000ML; G/1000ML
1000 INJECTION, SOLUTION INTRAVENOUS
Refills: 0 | Status: DISCONTINUED | OUTPATIENT
Start: 2020-04-10 | End: 2020-04-10

## 2020-04-10 RX ADMIN — PANTOPRAZOLE SODIUM 40 MILLIGRAM(S): 20 TABLET, DELAYED RELEASE ORAL at 11:53

## 2020-04-10 RX ADMIN — Medication 80 MILLIGRAM(S): at 04:49

## 2020-04-10 RX ADMIN — SODIUM CHLORIDE 75 MILLILITER(S): 9 INJECTION, SOLUTION INTRAVENOUS at 17:55

## 2020-04-10 RX ADMIN — HEPARIN SODIUM 1400 UNIT(S)/HR: 5000 INJECTION INTRAVENOUS; SUBCUTANEOUS at 11:53

## 2020-04-10 RX ADMIN — HEPARIN SODIUM 1400 UNIT(S)/HR: 5000 INJECTION INTRAVENOUS; SUBCUTANEOUS at 19:23

## 2020-04-10 RX ADMIN — Medication 1 PACKET(S): at 17:37

## 2020-04-10 RX ADMIN — DEXTROSE MONOHYDRATE, SODIUM CHLORIDE, AND POTASSIUM CHLORIDE 80 MILLILITER(S): 50; .745; 4.5 INJECTION, SOLUTION INTRAVENOUS at 11:53

## 2020-04-10 RX ADMIN — Medication 50 MICROGRAM(S): at 21:10

## 2020-04-10 NOTE — PROGRESS NOTE ADULT - ASSESSMENT
RADIOLOGY & ADDITIONAL STUDIES:  Xray Chest 1 View-PORTABLE IMMEDIATE (04.09.20 @ 19:50)     IMPRESSION: Clear lungs.    Imp--COPD s/p OD.    Mild bronchospasm, on albuterol  afebrile,  but rising WBC,    HD in AM,

## 2020-04-10 NOTE — PROGRESS NOTE ADULT - SUBJECTIVE AND OBJECTIVE BOX
aide at bedside on 1 to 1 reports restless night  patient extremely lethargic unable to maintain alertness to speak  Vital Signs Last 24 Hrs  T(C): 36.9 (10 Apr 2020 04:44), Max: 37.1 (09 Apr 2020 16:27)  T(F): 98.5 (10 Apr 2020 04:44), Max: 98.8 (09 Apr 2020 16:27)  HR: 70 (10 Apr 2020 04:44) (70 - 74)  BP: 146/72 (10 Apr 2020 04:44) (146/72 - 151/70)  RR: 18 (10 Apr 2020 04:44) (18 - 18)  SpO2: 97% (09 Apr 2020 16:27) (97% - 97%)                        9.7    27.92 )-----------( 299      ( 10 Apr 2020 09:30 )             30.5     04-10    145  |  100  |  48.0<H>  ----------------------------<  207<H>  3.4<L>   |  27.0  |  4.03<H>    Ca    9.2      10 Apr 2020 09:30  Phos  5.4     04-10  Mg     1.8     04-10    TPro  6.2<L>  /  Alb  3.0<L>  /  TBili  0.3<L>  /  DBili  x   /  AST  36<H>  /  ALT  101<H>  /  AlkPhos  92  04-09    LIVER FUNCTIONS - ( 09 Apr 2020 06:10 )  Alb: 3.0 g/dL / Pro: 6.2 g/dL / ALK PHOS: 92 U/L / ALT: 101 U/L / AST: 36 U/L / GGT: x           PT/INR - ( 09 Apr 2020 06:10 )   PT: 12.3 sec;   INR: 1.09 ratio         PTT - ( 10 Apr 2020 09:29 )  PTT:72.4 sec

## 2020-04-10 NOTE — SWALLOW BEDSIDE ASSESSMENT ADULT - SWALLOW EVAL: RECOMMENDED FEEDING/EATING TECHNIQUES
position upright (90 degrees)/crush medication (when feasible)/maintain upright posture during/after eating for 30 mins/no straws/oral hygiene/small sips/bites

## 2020-04-10 NOTE — CONSULT NOTE ADULT - REASON FOR ADMISSION
Resp failure and seizure

## 2020-04-10 NOTE — SWALLOW BEDSIDE ASSESSMENT ADULT - ASR SWALLOW ASPIRATION MONITOR
change of breathing pattern/position upright (90Y)/cough/gurgly voice/fever/oral hygiene/pneumonia/throat clearing/upper respiratory infection

## 2020-04-10 NOTE — SWALLOW BEDSIDE ASSESSMENT ADULT - SWALLOW EVAL: DIAGNOSIS
Pt presents w/functional oral swallow FOR CONSISTENCIES TRIALED thin, nectar & puree.  Suspected pharyngeal dysphagia w/thin liquids marked by +delayed swallow trigger and delayed cough post intake, pharyngeal phase functional for further trials of nectar and puree.

## 2020-04-10 NOTE — SWALLOW BEDSIDE ASSESSMENT ADULT - SLP GENERAL OBSERVATIONS
Pt recd supine in bed, re-positioned upright for PO trials. +Confusion, incoherent speech present, limited ability to follow simple commands despite max cues.  0/10 nonverbal pain scale pre and post encounter.  Tolerating RA without overt distress observed.

## 2020-04-10 NOTE — PROGRESS NOTE ADULT - ASSESSMENT
Patient is 74 year old female with PMH of COPD, lung nodule s/p wedge resection, and Hypothyroid who was BIBEMS with altered mental status. Admitted for suicidal attempt 2/2 opoid overdose (fentanyl patches) and subsequent withdrawal seizure complicated by hypoxic respiratory failure requiring intubation. Patient was successfully extubated and transferred to the hospitalist service for further management.    Acute Hypoxic Respiratory Failure 2/2 opoid overdose (fentanyl patch)  s/p intubation; now extubated  currently on 2L NC, wean as tolerated  underlying history of COPD  repeat CXR - clear lungs  COVID negative  C/w albuterol/ipratropium inhaler  pulmonary recommendations appreciated    Leukocytosis  -worsening today; previously on abx which were discontinued due to negative bcx  -check procal   -previously blood cultures negative  -2 episodes of diarrhea; check for C.Diff  -check UA, urine culture  -afebrile, therefore observe off abx  -ID consulted - discussed with Dr. Weaver    Dysphagia  pulled out NGT today  more awake  passed bedside swallow evaluation; start dysphagia diet    Suicidal ideation  continue constant observation  discussed with Dr. Carlos; premeditated suicide attempt (copy of suicide letter in chart)  needs inpatient psych admission at [facility] once medically cleared    Seizure disorder  s/p Ativan in the Ed with good response  Keppra started for seizures prevention now being titrated down as per neuro suggestions  No further seizure activity  Neuro recommendations appreciated    B/l UE DVT  Continue hep gtt  femoral shiley removed yesterday  if no further plans for HD; then can start NOAC vs coumadin  check coags in AM    Anemia  -likely anemia of kidney disease  -check iron studies  -on EPO  -check occult blood  -repeat H/H in AM    SUSAN due to ATN s/p HD  Now in the polyuric phase; stop diuretics  Start IVF today x 24 hours per renal  Hold HD today and monitor renal function  Monitor I/O, daily weights  Avoid nephrotoxic medications  Bailey in place; consider TOV in 24 hours  Renal recommendations appreciated    Hypothyroidism  TSH elevated, check free T3/T4  C/w synthroid at current dose for now    DVT ppx - Heparin gtt    Dispo - inpatient psych once medically cleared Patient is 74 year old female with PMH of COPD, lung nodule s/p wedge resection, and Hypothyroid who was BIBEMS with altered mental status. Admitted for suicidal attempt 2/2 opoid overdose (fentanyl patches) and subsequent withdrawal seizure complicated by hypoxic respiratory failure requiring intubation. Patient was successfully extubated and transferred to the hospitalist service for further management.    Acute Hypoxic Respiratory Failure 2/2 opoid overdose (fentanyl patch)  s/p intubation; now extubated  currently on 2L NC, wean as tolerated  underlying history of COPD  repeat CXR - clear lungs  COVID negative  C/w albuterol/ipratropium inhaler  pulmonary recommendations appreciated    Leukocytosis  -worsening today; previously on abx which were discontinued due to negative bcx  -check procal   -previously blood cultures negative  -2 episodes of diarrhea; check for C.Diff  -check UA, urine culture  -afebrile, therefore observe off abx  -ID consulted - discussed with Dr. Weaver    Dysphagia  pulled out NGT today  more awake  passed bedside swallow evaluation; start dysphagia diet    Suicidal ideation  continue constant observation  discussed with Dr. Carlos; premeditated suicide attempt (copy of suicide letter in chart)  needs inpatient psych admission at [facility] once medically cleared    Seizure disorder  s/p Ativan in the Ed with good response  Keppra started for seizures prevention; now tapered off  No further seizure activity  Neuro recommendations appreciated     B/l UE DVT  Continue hep gtt  femoral shiley removed yesterday  if no further plans for HD; then can start NOAC vs coumadin  check coags in AM    Anemia  -likely anemia of kidney disease  -check iron studies  -on EPO  -check occult blood  -repeat H/H in AM    SUSAN due to ATN s/p HD  Now in the polyuric phase; stop diuretics  Start IVF today x 24 hours per renal  Hold HD today and monitor renal function  Monitor I/O, daily weights  Avoid nephrotoxic medications  Bailey in place; consider TOV in 24 hours  Renal recommendations appreciated    Hypothyroidism  TSH elevated, check free T3/T4  C/w synthroid at current dose for now    DVT ppx - Heparin gtt    Dispo - inpatient psych once medically cleared

## 2020-04-10 NOTE — PROGRESS NOTE ADULT - PROBLEM SELECTOR PLAN 2
Resolved
supportive care--plan for psych admit once medically stable  spoke with family - Zina today

## 2020-04-10 NOTE — PROGRESS NOTE ADULT - SUBJECTIVE AND OBJECTIVE BOX
ICU Vital Signs Last 24 Hrs,    T(C): 36.9 (10 Apr 2020 10:38), Max: 37.1 (2020 16:27)  T(F): 98.4 (10 Apr 2020 10:38), Max: 98.8 (2020 16:27)  HR: 66 (10 Apr 2020 10:38) (66 - 74)  BP: 144/78 (10 Apr 2020 10:38) (144/78 - 151/70)  RR: 18 (10 Apr 2020 10:38) (18 - 18)  SpO2: 97% (2020 16:27) (97% - 97%)    145    |  100    |  48.0<H>  ----------------------------<  207<H>  Ca:9.2   (10 Apr 2020 09:30)  3.4<L>   |  27.0   |  4.03<H>    eGFR if Non : 10 <L>  eGFR if : 12 <L>    TPro  6.2<L>  /  Alb  3.0<L>  /  TBili  0.3<L>  /  DBili  x      /  AST  36<H>  /  ALT  101<H>  /  AlkPhos  92     2020 06:10                               9.7<L>  27.92<H> )-----------( 299      ( 10 Apr 2020 09:30 )                  30.5<L>    Phos:5.4 mg/dL<H> M.8 mg/dL     Chief Complaint: SUSAN /Ongoing hemodialysis requirement    24 hour events/subjective:  s/p HD yesterday with 1.1 L UF  UOP~ 825 ml    PAST HISTORY  --------------------------------------------------------------------------------  No significant changes to PMH, PSH, FHx, SHx, unless otherwise noted    ALLERGIES & MEDICATIONS  --------------------------------------------------------------------------------  Allergies  No Known Allergies    Standing Inpatient Medications  ALBUTerol    90 MICROgram(s) HFA Inhaler 2 Puff(s) Inhalation every 6 hours  furosemide    Tablet 80 milliGRAM(s) Oral two times a day  heparin  Infusion.  Unit(s)/Hr IV Continuous <Continuous>  ipratropium 17 MICROgram(s) HFA Inhaler 1 Puff(s) Inhalation every 6 hours  levothyroxine Injectable 50 MICROGram(s) IV Push at bedtime  pantoprazole  Injectable 40 milliGRAM(s) IV Push daily  psyllium Powder 1 Packet(s) Oral two times a day    PRN Inpatient Medications  ALBUTerol    90 MICROgram(s) HFA Inhaler 1 Puff(s) Inhalation every 4 hours PRN  heparin  Injectable 6500 Unit(s) IV Push every 6 hours PRN  heparin  Injectable 3000 Unit(s) IV Push every 6 hours PRN    REVIEW OF SYSTEMS  --------------------------------------------------------------------------------  Gen: No weight changes, fatigue, fevers/chills, weakness  Skin: No rashes  Head/Eyes/Ears/Mouth: No headache  Respiratory: No dyspnea, cough,  CV: No chest pain, orthopnea  GI: No abdominal pain, diarrhea, constipation, nausea, vomiting,  MSK: No joint pain  Neuro: No dizziness/lightheadedness, weakness  Heme: No bleeding  Psych: No significant nervousness, anxiety, stress, depression    All other systems were reviewed and are negative, except as noted.    VITALS/PHYSICAL EXAM  --------------------------------------------------------------------------------  T(C): 37 (20 @ 09:48), Max: 37.1 (20 @ 18:34)  HR: 80 (20 09:48) (61 - 80)  BP: 160/78 (20 @ 09:48) (127/70 - 180/76)  RR: 18 (20 09:48) (18 - 21)  SpO2: 95% (20 @ 09:48) (94% - 98%)    20 @ 07:01  -  20 @ 07:00  --------------------------------------------------------  IN: 1126 mL / OUT: 1825 mL / NET: -699 mL    Physical Exam:  	Gen: NAD, well-appearing  	HEENT: supple neck  	Pulm: CTA B/L  	CV: RRR, S1S2; no rub  	Abd: +BS, soft, nontender  	UE: Warm, no asterixis  	LE: Warm, + edema  	Neuro: No focal deficits  	Psych: Normal affect and mood  	Skin: Warm, without rashes  	Vascular access: Right femoral catheter    LABS/STUDIES  --------------------------------------------------------------------------------              11.4   14.29 >-----------<  186      [20 @ 06:10]              35.8     144  |  103  |  40.0  ----------------------------<  136      [20 @ 06:10]  3.7   |  24.0  |  4.06        Ca     9.2     [20 06:10]      Mg     2.2     [20 @ 06:10]    TPro  6.2  /  Alb  3.0  /  TBili  0.3  /  DBili  x   /  AST  36  /  ALT  101  /  AlkPhos  92  [20 @ 06:10]    PT/INR: PT 12.3 , INR 1.09       [20 06:10]  PTT: 43.1       [20 06:10]    Creatinine Trend:  SCr 4.06 [:10]  SCr 5.51 [ 08:57]  SCr 5.06 [ 08:55]  SCr 5.30 [ 02:05]  SCr 4.34 [ 03:56]    Urinalysis - [20 @ 06:36]      Color Yellow / Appearance Slightly Turbid / SG 1.020 / pH 5.0      Gluc Negative / Ketone Trace  / Bili Negative / Urobili 1       Blood Large / Protein 100 / Leuk Est Trace / Nitrite Negative      RBC 11-25 / WBC 3-5 / Hyaline  / Gran  / Sq Epi  / Non Sq Epi Occasional / Bacteria Moderate    Ferritin 3576      [20 @ 18:51]  HbA1c 5.7      [20 @ 03:44]  TSH 1.97      [20 @ 03:57]  Lipid: chol 106, , HDL 31, LDL 27      [20 @ 02:49]    HBsAb Nonreact      [20 @ 07:33]  HBsAg Nonreact      [20 07:33]  HCV 0.18, Nonreact      [20 @ 07:33]  HIV Nonreact      [20 @ 02:49]    Acute Hypoxic respiratory failure related to Intentional Narcotic Overdose (Suicide attempt with fentanyl)  Post arrest encephalopathy  anemia    Acute Tubular Necrosis, now on HD- last HD   Bailey removed, Bladder scan q 12 hrs w/ straight cath  Hb at goal- monitor H/H

## 2020-04-10 NOTE — PROGRESS NOTE ADULT - NSHPATTENDINGPLANDISCUSS_GEN_ALL_CORE
"Chief Complaint   Patient presents with     Establish Care       Initial /90 (BP Location: Right arm, Patient Position: Sitting, Cuff Size: Adult Large)  Pulse 74  Temp 98.1  F (36.7  C) (Tympanic)  Ht 5' 10.75\" (1.797 m)  Wt 255 lb 9.6 oz (115.9 kg)  BMI 35.9 kg/m2 Estimated body mass index is 35.9 kg/(m^2) as calculated from the following:    Height as of this encounter: 5' 10.75\" (1.797 m).    Weight as of this encounter: 255 lb 9.6 oz (115.9 kg).  Medication Reconciliation: complete   Diana Jose CMA    "
Hospitalist
family as above
patient, Dr. Garcia, RN, Dr. Alonso, Dr Carlos
pt, rn, PA
ICU team

## 2020-04-10 NOTE — SWALLOW BEDSIDE ASSESSMENT ADULT - COMMENTS
no further trials administered 2* decreased level of cognitive function as well as edentulous status

## 2020-04-10 NOTE — PROGRESS NOTE ADULT - SUBJECTIVE AND OBJECTIVE BOX
PULMONARY PROGRESS NOTE      JOSE M KEARNEYBRITTANI-[id number]    Patient is a 74y old  Female who presents with a chief complaint of Resp failure and seizure s/p Fentanyl overdose (10 Apr 2020 10:28)      INTERVAL HPI/OVERNIGHT EVENTS:Comfortable lying flat.  NAD on NCO2.      MEDICATIONS  (STANDING):  ALBUTerol    90 MICROgram(s) HFA Inhaler 2 Puff(s) Inhalation every 6 hours  budesonide 160 MICROgram(s)/formoterol 4.5 MICROgram(s) Inhaler 2 Puff(s) Inhalation two times a day  furosemide    Tablet 80 milliGRAM(s) Oral two times a day  heparin  Infusion.  Unit(s)/Hr (14 mL/Hr) IV Continuous <Continuous>  levothyroxine Injectable 50 MICROGram(s) IV Push at bedtime  pantoprazole  Injectable 40 milliGRAM(s) IV Push daily  psyllium Powder 1 Packet(s) Oral two times a day  tiotropium 18 MICROgram(s) Capsule 1 Capsule(s) Inhalation daily      MEDICATIONS  (PRN):  heparin  Injectable 6500 Unit(s) IV Push every 6 hours PRN For aPTT less than 40  heparin  Injectable 3000 Unit(s) IV Push every 6 hours PRN For aPTT between 40 - 57      Allergies    No Known Allergies    Intolerances        PAST MEDICAL & SURGICAL HISTORY:  Lung nodule  COPD (chronic obstructive pulmonary disease)  Hyperlipidemia  Hypothyroid  Chronic knee pain  S/P hysterectomy  S/P  section      SOCIAL HISTORY  Smoking History:       REVIEW OF SYSTEMS:    CONSTITUTIONAL:  No distress    HEENT:  Eyes:  No diplopia or blurred vision. ENT:  No earache, sore throat or runny nose.    CARDIOVASCULAR:  No pressure, squeezing, tightness, heaviness or aching about the chest; no palpitations.    RESPIRATORY:  per hpi    GASTROINTESTINAL:  No nausea, vomiting or diarrhea.    GENITOURINARY:  No dysuria, frequency or urgency.    MUSCULOSKELETAL:  No joint pain    SKIN:  No new lesions.    NEUROLOGIC:  No paresthesias, fasciculations, seizures or weakness.    PSYCHIATRIC:  No disorder of thought or mood.    ENDOCRINE:  No heat or cold intolerance, polyuria or polydipsia.    HEMATOLOGICAL:  No easy bruising or bleeding.     Vital Signs Last 24 Hrs  T(C): 36.9 (10 Apr 2020 10:38), Max: 37.1 (2020 16:27)  T(F): 98.4 (10 Apr 2020 10:38), Max: 98.8 (2020 16:27)  HR: 66 (10 Apr 2020 10:38) (66 - 74)  BP: 144/78 (10 Apr 2020 10:38) (144/78 - 151/70)  BP(mean): --  RR: 18 (10 Apr 2020 10:38) (18 - 18)  SpO2: 97% (2020 16:27) (97% - 97%)    PHYSICAL EXAMINATION:    GENERAL: The patient is awake and alert in no apparent distress.     HEENT: Head is normocephalic and atraumatic. Extraocular muscles are intact. Mucous membranes are moist.    NECK: Supple.    LUNGS:scattered exp rhonchi    HEART: Regular rate and rhythm without murmur.    ABDOMEN: Soft, nontender, and nondistended.      EXTREMITIES: Without any cyanosis, clubbing, rash, lesions or edema.    NEUROLOGIC: Grossly intact.    SKIN: No ulceration or induration present.      LABS:                        9.7    27.92 )-----------( 299      ( 10 Apr 2020 09:30 )             30.5     04-10    145  |  100  |  48.0<H>  ----------------------------<  207<H>  3.4<L>   |  27.0  |  4.03<H>    Ca    9.2      10 Apr 2020 09:30  Phos  5.4     04-10  Mg     1.8     04-10    TPro  6.2<L>  /  Alb  3.0<L>  /  TBili  0.3<L>  /  DBili  x   /  AST  36<H>  /  ALT  101<H>  /  AlkPhos  92  04-09    PT/INR - ( 2020 06:10 )   PT: 12.3 sec;   INR: 1.09 ratio         PTT - ( 10 Apr 2020 09:29 )  PTT:72.4 sec                    MICROBIOLOGY:    RADIOLOGY & ADDITIONAL STUDIES:< from: Xray Chest 1 View-PORTABLE IMMEDIATE (20 @ 19:50) >  IMPRESSION: Clear lungs.    < end of copied text >

## 2020-04-10 NOTE — PROGRESS NOTE ADULT - SUBJECTIVE AND OBJECTIVE BOX
CHIEF COMPLAINT/INTERVAL HISTORY:    Patient is a 74y old  Female who presents with a chief complaint of Resp failure and seizure (10 Apr 2020 14:32)    SUBJECTIVE & OBJECTIVE: Pt seen and examined at bedside. No overnight events.    ROS: No chest pain, palpitations, SOB, light headedness, dizziness, headache, nausea/vomiting, fevers/chills, abdominal pain, dysuria or increased urinary frequency.    ICU Vital Signs Last 24 Hrs  T(C): 36.9 (10 Apr 2020 10:38), Max: 37.1 (2020 16:27)  T(F): 98.4 (10 Apr 2020 10:38), Max: 98.8 (2020 16:27)  HR: 66 (10 Apr 2020 10:38) (66 - 74)  BP: 144/78 (10 Apr 2020 10:38) (144/78 - 151/70)  RR: 18 (10 Apr 2020 10:38) (18 - 18)  SpO2: 97% (2020 16:27) (97% - 97%)    MEDICATIONS  (STANDING):  ALBUTerol    90 MICROgram(s) HFA Inhaler 2 Puff(s) Inhalation every 6 hours  budesonide 160 MICROgram(s)/formoterol 4.5 MICROgram(s) Inhaler 2 Puff(s) Inhalation two times a day  epoetin-fabian-epbx (RETACRIT) Injectable 8000 Unit(s) SubCutaneous <User Schedule>  heparin  Infusion.  Unit(s)/Hr (14 mL/Hr) IV Continuous <Continuous>  levothyroxine Injectable 50 MICROGram(s) IV Push at bedtime  pantoprazole  Injectable 40 milliGRAM(s) IV Push daily  psyllium Powder 1 Packet(s) Oral two times a day  sodium chloride 0.45% 1000 milliLiter(s) (75 mL/Hr) IV Continuous <Continuous>  tiotropium 18 MICROgram(s) Capsule 1 Capsule(s) Inhalation daily    MEDICATIONS  (PRN):  heparin  Injectable 6500 Unit(s) IV Push every 6 hours PRN For aPTT less than 40  heparin  Injectable 3000 Unit(s) IV Push every 6 hours PRN For aPTT between 40 - 57      LABS:                        9.3    34.71 )-----------( Clumped    ( 10 Apr 2020 13:12 )             29.1     04-10    136  |  94<L>  |  50.0<H>  ----------------------------<  207<H>  5.4<H>   |  22.0  |  3.82<H>    Ca    8.4<L>      10 Apr 2020 13:12  Phos  5.5     04-10  Mg     1.7     04-10    TPro  6.2<L>  /  Alb  3.0<L>  /  TBili  0.3<L>  /  DBili  x   /  AST  36<H>  /  ALT  101<H>  /  AlkPhos  92  04-09    PT/INR - ( 2020 06:10 )   PT: 12.3 sec;   INR: 1.09 ratio         PTT - ( 10 Apr 2020 09:29 )  PTT:72.4 sec  Urinalysis Basic - ( 10 Apr 2020 14:07 )    Color: Yellow / Appearance: Slightly Turbid / S.015 / pH: x  Gluc: x / Ketone: Negative  / Bili: Negative / Urobili: Negative mg/dL   Blood: x / Protein: 15 mg/dL / Nitrite: Negative   Leuk Esterase: Small / RBC: 25-50 /HPF / WBC 6-10   Sq Epi: x / Non Sq Epi: Occasional / Bacteria: Few      PHYSICAL EXAM:    GENERAL: awake, alert, oriented to self   HEAD:  Atraumatic, Normocephalic  EYES: EOMI, PERRLA   ENMT: Moist mucous membranes  NECK: Supple   NERVOUS SYSTEM:  Alert & Oriented X1  CHEST/LUNG: bilateral air entry  HEART: Regular rate and rhythm; + S1/S2  ABDOMEN: Soft, Nontender, Nondistended   EXTREMITIES:  no pedal edema

## 2020-04-10 NOTE — CONSULT NOTE ADULT - SUBJECTIVE AND OBJECTIVE BOX
[facility]  INFECTIOUS DISEASES AND INTERNAL MEDICINE at [place]  =======================================================  Rafal East MD  Diplomates American Board of Internal Medicine and Infectious Diseases  =======================================================    [id number]  JOSE M KEARNEY     CC:  Resp failure and seizure     HPI:  73y/o woman with COPD and hypothyrodism was admitted on 3/30 after being found AMS and attempted suicide. She had multiple fentanyl patches on her body. She was intubated and transferred to MICU at that time. She had neg Blood culture and CXR. Now she is extubated and transferred to medical floor. ID has been called for leukocytosis.   She is afebrile but complaining of abdominal pain and diarrhea since yesterday. She was also ruled out for COVID19.     PAST MEDICAL & SURGICAL HISTORY:  Lung nodule  COPD (chronic obstructive pulmonary disease)  Hyperlipidemia  Hypothyroid  Chronic knee pain  S/P hysterectomy  S/P  section    Social Hx: smoker but no ETOH or drugs    FAMILY HISTORY:  No pertinent family history in first degree relatives    Allergies  No Known Allergies    Antibiotics:  None     REVIEW OF SYSTEMS:  CONSTITUTIONAL:  No Fever or chills  HEENT:  No diplopia or blurred vision.  No sore throat or runny nose.  CARDIOVASCULAR:  No chest pain or SOB.  RESPIRATORY:  No cough, shortness of breath, PND or orthopnea.  GASTROINTESTINAL:  No nausea, vomiting, +diarrhea, + abdominal pain   GENITOURINARY:  No dysuria, frequency or urgency. No Blood in urine  MUSCULOSKELETAL:  no joint aches, no muscle pain  SKIN:  No change in skin, hair or nails.  NEUROLOGIC:  No paresthesias, fasciculations, seizures or weakness.  PSYCHIATRIC:  No disorder of thought or mood.  ENDOCRINE:  No heat or cold intolerance, polyuria or polydipsia.  HEMATOLOGICAL:  No easy bruising or bleeding.     Physical Exam:  Vital Signs Last 24 Hrs  T(C): 36.9 (10 Apr 2020 10:38), Max: 37.1 (2020 16:27)  T(F): 98.4 (10 Apr 2020 10:38), Max: 98.8 (2020 16:27)  HR: 66 (10 Apr 2020 10:38) (66 - 74)  BP: 144/78 (10 Apr 2020 10:38) (144/78 - 151/70)  BP(mean): --  RR: 18 (10 Apr 2020 10:38) (18 - 18)  SpO2: 97% (2020 16:27) (97% - 97%)  GEN: NAD  HEENT: normocephalic and atraumatic. EOMI. PERRL.    NECK: Supple.  No lymphadenopathy   LUNGS: Clear to auscultation.  HEART: Regular rate and rhythm   ABDOMEN: Soft, nontender, and nondistended.  Positive bowel sounds.    : No CVA tenderness  EXTREMITIES: trace edema.  NEUROLOGIC: grossly intact.  PSYCHIATRIC: Appropriate affect .  SKIN: No rash     Labs:  04-10    136  |  94<L>  |  50.0<H>  ----------------------------<  207<H>  5.4<H>   |  22.0  |  3.82<H>    Ca    8.4<L>      10 Apr 2020 13:12  Phos  5.5     04-10  Mg     1.7     04-10    TPro  6.2<L>  /  Alb  3.0<L>  /  TBili  0.3<L>  /  DBili  x   /  AST  36<H>  /  ALT  101<H>  /  AlkPhos  92  04-09                     9.3    34.71 )-----------( Clumped    ( 10 Apr 2020 13:12 )             29.1     PT/INR - ( 2020 06:10 )   PT: 12.3 sec;   INR: 1.09 ratio    PTT - ( 10 Apr 2020 09:29 )  PTT:72.4 sec    LIVER FUNCTIONS - ( 2020 06:10 )  Alb: 3.0 g/dL / Pro: 6.2 g/dL / ALK PHOS: 92 U/L / ALT: 101 U/L / AST: 36 U/L / GGT: x           RECENT CULTURES:   @ 06:40 .Blood     No growth at 5 days.     @ 18:53 .Blood     No growth at 5 days.    All imaging and other data have been reviewed.    < from: Xray Chest 1 View-PORTABLE IMMEDIATE (20 @ 19:50) >   EXAM:  XR CHEST PORTABLE IMMED 1V                        PROCEDURE DATE:  2020    INTERPRETATION:  XR CHEST IMMEDIATE  Single AP view  HISTORY:  Shortness of breath  Comparison: Same day chest x-ray  The NG tube is in the stomach.  The cardiac silhouette is within normal limits. The lungs are clear. No pleural abnormality.  IMPRESSION: Clear lungs.    Assessment and Plan:   73y/o woman with COPD and hypothyrodism was admitted on 3/30 after being found AMS and attempted suicide. She had multiple fentanyl patches on her body. She was intubated and transferred to MICU at that time. She had neg Blood culture and CXR. Now she is extubated and transferred to medical floor. ID has been called for leukocytosis.     Leukocytosis   Diarrhea    - Blood culture on 3/30 and  negative, will repeat blood culture   - UA negative   - CXR neg  - COVID negative on admission   - Procalcitonin pending   - Will send GI PCR and C diff  - IF negative will do abdominal CT  - Leukocytosis now 34k, will trend it  - Afebrile, trend Tmax  - If hemodynamically unstable will cover with broad spectrum antibiotics empirically   - No antibiotics at this time.     Will follow.

## 2020-04-11 LAB
ANION GAP SERPL CALC-SCNC: 19 MMOL/L — HIGH (ref 5–17)
APTT BLD: 82.4 SEC — HIGH (ref 27.5–36.3)
BASOPHILS # BLD AUTO: 0.07 K/UL — SIGNIFICANT CHANGE UP (ref 0–0.2)
BASOPHILS NFR BLD AUTO: 0.2 % — SIGNIFICANT CHANGE UP (ref 0–2)
BLD GP AB SCN SERPL QL: SIGNIFICANT CHANGE UP
BUN SERPL-MCNC: 61 MG/DL — HIGH (ref 8–20)
CALCIUM SERPL-MCNC: 8.8 MG/DL — SIGNIFICANT CHANGE UP (ref 8.6–10.2)
CHLORIDE SERPL-SCNC: 96 MMOL/L — LOW (ref 98–107)
CO2 SERPL-SCNC: 28 MMOL/L — SIGNIFICANT CHANGE UP (ref 22–29)
CREAT SERPL-MCNC: 4.27 MG/DL — HIGH (ref 0.5–1.3)
EOSINOPHIL # BLD AUTO: 0.04 K/UL — SIGNIFICANT CHANGE UP (ref 0–0.5)
EOSINOPHIL NFR BLD AUTO: 0.1 % — SIGNIFICANT CHANGE UP (ref 0–6)
FERRITIN SERPL-MCNC: 277 NG/ML — HIGH (ref 15–150)
GLUCOSE SERPL-MCNC: 132 MG/DL — HIGH (ref 70–99)
HCT VFR BLD CALC: 21.5 % — LOW (ref 34.5–45)
HGB BLD-MCNC: 6.8 G/DL — CRITICAL LOW (ref 11.5–15.5)
IMM GRANULOCYTES NFR BLD AUTO: 2.6 % — HIGH (ref 0–1.5)
INR BLD: 1.4 RATIO — HIGH (ref 0.88–1.16)
IRON SATN MFR SERPL: 11 % — LOW (ref 14–50)
IRON SATN MFR SERPL: 30 UG/DL — LOW (ref 37–145)
LYMPHOCYTES # BLD AUTO: 1.56 K/UL — SIGNIFICANT CHANGE UP (ref 1–3.3)
LYMPHOCYTES # BLD AUTO: 5.1 % — LOW (ref 13–44)
MAGNESIUM SERPL-MCNC: 1.7 MG/DL — SIGNIFICANT CHANGE UP (ref 1.6–2.6)
MCHC RBC-ENTMCNC: 31.3 PG — SIGNIFICANT CHANGE UP (ref 27–34)
MCHC RBC-ENTMCNC: 31.6 GM/DL — LOW (ref 32–36)
MCV RBC AUTO: 99.1 FL — SIGNIFICANT CHANGE UP (ref 80–100)
MONOCYTES # BLD AUTO: 1.61 K/UL — HIGH (ref 0–0.9)
MONOCYTES NFR BLD AUTO: 5.2 % — SIGNIFICANT CHANGE UP (ref 2–14)
NEUTROPHILS # BLD AUTO: 26.61 K/UL — HIGH (ref 1.8–7.4)
NEUTROPHILS NFR BLD AUTO: 86.8 % — HIGH (ref 43–77)
OB PNL STL: NEGATIVE — SIGNIFICANT CHANGE UP
PHOSPHATE SERPL-MCNC: 6 MG/DL — HIGH (ref 2.4–4.7)
PLATELET # BLD AUTO: 294 K/UL — SIGNIFICANT CHANGE UP (ref 150–400)
POTASSIUM SERPL-MCNC: 3.8 MMOL/L — SIGNIFICANT CHANGE UP (ref 3.5–5.3)
POTASSIUM SERPL-SCNC: 3.8 MMOL/L — SIGNIFICANT CHANGE UP (ref 3.5–5.3)
PROTHROM AB SERPL-ACNC: 16 SEC — HIGH (ref 10–12.9)
RBC # BLD: 2.17 M/UL — LOW (ref 3.8–5.2)
RBC # FLD: 14.6 % — HIGH (ref 10.3–14.5)
SODIUM SERPL-SCNC: 143 MMOL/L — SIGNIFICANT CHANGE UP (ref 135–145)
T3FREE SERPL-MCNC: 0.82 PG/ML — LOW (ref 1.8–4.6)
T4 FREE SERPL-MCNC: 0.6 NG/DL — LOW (ref 0.9–1.8)
TIBC SERPL-MCNC: 269 UG/DL — SIGNIFICANT CHANGE UP (ref 220–430)
TRANSFERRIN SERPL-MCNC: 188 MG/DL — LOW (ref 192–382)
WBC # BLD: 30.68 K/UL — HIGH (ref 3.8–10.5)
WBC # FLD AUTO: 30.68 K/UL — HIGH (ref 3.8–10.5)

## 2020-04-11 PROCEDURE — 99233 SBSQ HOSP IP/OBS HIGH 50: CPT

## 2020-04-11 PROCEDURE — 99232 SBSQ HOSP IP/OBS MODERATE 35: CPT

## 2020-04-11 RX ORDER — SODIUM BICARBONATE 1 MEQ/ML
0.12 SYRINGE (ML) INTRAVENOUS
Qty: 75 | Refills: 0 | Status: DISCONTINUED | OUTPATIENT
Start: 2020-04-11 | End: 2020-04-12

## 2020-04-11 RX ADMIN — HEPARIN SODIUM 1400 UNIT(S)/HR: 5000 INJECTION INTRAVENOUS; SUBCUTANEOUS at 19:56

## 2020-04-11 RX ADMIN — Medication 1 PACKET(S): at 05:00

## 2020-04-11 RX ADMIN — Medication 50 MICROGRAM(S): at 22:51

## 2020-04-11 RX ADMIN — PANTOPRAZOLE SODIUM 40 MILLIGRAM(S): 20 TABLET, DELAYED RELEASE ORAL at 18:11

## 2020-04-11 NOTE — PROCEDURE NOTE - NSSITEPREP_SKIN_A_CORE
chlorhexidine
chlorhexidine/Adherence to aseptic technique: hand hygiene prior to donning barriers (gown, gloves), don cap and mask, sterile drape over patient
Adherence to aseptic technique: hand hygiene prior to donning barriers (gown, gloves), don cap and mask, sterile drape over patient/chlorhexidine
chlorhexidine

## 2020-04-11 NOTE — PROCEDURE NOTE - NSPROCDETAILS_GEN_ALL_CORE
supine position/ultrasound assessment/sterile technique, catheter placed/ultrasound guidance/location identified, draped/prepped, sterile technique used/sterile dressing applied
all materials/supplies accounted for at end of procedure/location identified, draped/prepped, sterile technique used, needle inserted/introduced/hemostasis with direct pressure, dressing applied/positive blood return obtained via catheter/sutured in place/connected to a pressurized flush line/Seldinger technique
lumen(s) aspirated and flushed/sterile dressing applied/sterile technique, catheter placed/guidewire recovered
lumen(s) aspirated and flushed/guidewire recovered/sterile dressing applied/sterile technique, catheter placed

## 2020-04-11 NOTE — PROGRESS NOTE ADULT - SUBJECTIVE AND OBJECTIVE BOX
[facility] DIVISION OF KIDNEY DISEASES AND HYPERTENSION -- FOLLOW UP NOTE  --------------------------------------------------------------------------------  Chief Complaint:  SUSAN with HD requirement    24 hour events/subjective:  Last HD was 4/9/2020      PAST HISTORY  --------------------------------------------------------------------------------  No significant changes to PMH, PSH, FHx, SHx, unless otherwise noted    ALLERGIES & MEDICATIONS  --------------------------------------------------------------------------------  Allergies    No Known Allergies    Intolerances      Standing Inpatient Medications  ALBUTerol    90 MICROgram(s) HFA Inhaler 2 Puff(s) Inhalation every 6 hours  budesonide 160 MICROgram(s)/formoterol 4.5 MICROgram(s) Inhaler 2 Puff(s) Inhalation two times a day  epoetin-fabian-epbx (RETACRIT) Injectable 8000 Unit(s) SubCutaneous <User Schedule>  heparin  Infusion.  Unit(s)/Hr IV Continuous <Continuous>  levothyroxine Injectable 50 MICROGram(s) IV Push at bedtime  pantoprazole  Injectable 40 milliGRAM(s) IV Push daily  psyllium Powder 1 Packet(s) Oral two times a day  sodium chloride 0.45% 1000 milliLiter(s) IV Continuous <Continuous>  tiotropium 18 MICROgram(s) Capsule 1 Capsule(s) Inhalation daily    PRN Inpatient Medications  heparin  Injectable 6500 Unit(s) IV Push every 6 hours PRN  heparin  Injectable 3000 Unit(s) IV Push every 6 hours PRN      REVIEW OF SYSTEMS  --------------------------------------------------------------------------------  Gen: No weight changes, fatigue, fevers/chills, weakness  Skin: No rashes  Head/Eyes/Ears/Mouth: No headache; Normal hearing; Normal vision w/o blurriness; No sinus pain/discomfort, sore throat  Respiratory: No dyspnea, cough, wheezing, hemoptysis  CV: No chest pain, PND, orthopnea  GI: No abdominal pain, diarrhea, constipation, nausea, vomiting, melena, hematochezia  : No increased frequency, dysuria, hematuria, nocturia  MSK: No joint pain/swelling; no back pain; no edema  Neuro: No dizziness/lightheadedness, weakness, seizures, numbness, tingling  Heme: No easy bruising or bleeding  Endo: No heat/cold intolerance  Psych: No significant nervousness, anxiety, stress, depression    All other systems were reviewed and are negative, except as noted.    VITALS/PHYSICAL EXAM  --------------------------------------------------------------------------------  T(C): 37 (04-11-20 @ 04:00), Max: 37 (04-11-20 @ 04:00)  HR: 102 (04-11-20 @ 04:00) (82 - 102)  BP: 118/76 (04-11-20 @ 04:00) (114/81 - 160/76)  RR: 16 (04-11-20 @ 04:00) (16 - 18)  SpO2: 94% (04-10-20 @ 21:14) (93% - 94%)  Wt(kg): --        04-10-20 @ 07:01  -  04-11-20 @ 07:00  --------------------------------------------------------  IN: 1157 mL / OUT: 575 mL / NET: 582 mL      Physical Exam:  	Gen: NAD, well-appearing  	HEENT: supple neck  	Pulm: CTA B/L  	CV: RRR, S1S2; no rub  	Abd: +BS, soft, nontender  	UE: Warm, no asterixis  	LE: Warm, + edema  	Neuro: No focal deficits  	Psych: Normal affect and mood  	Skin: Warm, without rashes  	Vascular access: Right femoral catheter    LABS/STUDIES  --------------------------------------------------------------------------------              6.8    30.68 >-----------<  294      [04-11-20 @ 09:09]              21.5     143  |  96  |  61.0  ----------------------------<  132      [04-11-20 @ 09:09]  3.8   |  28.0  |  4.27        Ca     8.8     [04-11-20 @ 09:09]      Mg     1.7     [04-11-20 @ 09:09]      Phos  6.0     [04-11-20 @ 09:09]      PT/INR: PT 16.0 , INR 1.40       [04-11-20 @ 09:09]  PTT: 82.4       [04-11-20 @ 09:09]      Creatinine Trend:  SCr 4.27 [04-11 @ 09:09]  SCr 3.82 [04-10 @ 13:12]  SCr 4.03 [04-10 @ 09:30]  SCr 4.06 [04-09 @ 06:10]  SCr 5.51 [04-08 @ 08:57]    Urinalysis - [04-10-20 @ 14:07]      Color Yellow / Appearance Slightly Turbid / SG 1.015 / pH 5.0      Gluc Negative / Ketone Negative  / Bili Negative / Urobili Negative       Blood Large / Protein 15 / Leuk Est Small / Nitrite Negative      RBC 25-50 / WBC 6-10 / Hyaline  / Gran  / Sq Epi  / Non Sq Epi Occasional / Bacteria Few      Iron 30, TIBC 269, %sat 11      [04-11-20 @ 09:09]  Ferritin 277      [04-11-20 @ 09:09]  HbA1c 5.7      [04-03-20 @ 03:44]  TSH 10.92      [04-09-20 @ 21:53]  Lipid: chol 106, , HDL 31, LDL 27      [04-01-20 @ 02:49]    HBsAb Nonreact      [04-01-20 @ 07:33]  HBsAg Nonreact      [04-01-20 @ 07:33]  HCV 0.18, Nonreact      [04-01-20 @ 07:33]  HIV Nonreact      [04-01-20 @ 02:49]    Syphilis Screen (Treponema Pallidum Ab) Negative      [04-10-20 @ 16:03]

## 2020-04-11 NOTE — PROGRESS NOTE ADULT - ASSESSMENT
RADIOLOGY & ADDITIONAL STUDIES:  Xray Chest 1 View-PORTABLE IMMEDIATE (04.09.20 @ 19:50)     IMPRESSION: Clear lungs.    Imp--COPD s/p OD.    Mild bronchospasm, on albuterol  afebrile,  but rising WBC,    HD to be held today

## 2020-04-11 NOTE — PROGRESS NOTE ADULT - SUBJECTIVE AND OBJECTIVE BOX
[facility]                                        Neurology at [place]                                  Ketty Hauser, & Rakesh                                      [address]                                           [place], [place], [address]                                                [phone]        CC: seizure and encephalopathy.     HISTORY:  The patient is a 74y Female who was admitted on 3/30/2020 after being found unresponsive with agonal respirations. She was found to have multiple fentanyl patches on her skin which were removed in the ER. She was given Narcan with transient improvement but ultimately had a generalized tonic clonic seizure and was intubated in the ER.   She was started on Keppra.   She was extubated on 4/2/2020.  I was called today to evaluate need for long term Keppra. (JW)    Interval history: alert, speaks few words with me    ROS neurology: unable to obtain due to mental status    MEDICATIONS  (STANDING):  ALBUTerol    90 MICROgram(s) HFA Inhaler 2 Puff(s) Inhalation every 6 hours  budesonide 160 MICROgram(s)/formoterol 4.5 MICROgram(s) Inhaler 2 Puff(s) Inhalation two times a day  epoetin-fabian-epbx (RETACRIT) Injectable 8000 Unit(s) SubCutaneous <User Schedule>  heparin  Infusion.  Unit(s)/Hr (14 mL/Hr) IV Continuous <Continuous>  levothyroxine Injectable 50 MICROGram(s) IV Push at bedtime  pantoprazole  Injectable 40 milliGRAM(s) IV Push daily  psyllium Powder 1 Packet(s) Oral two times a day  sodium bicarbonate  Infusion 0.117 mEq/kG/Hr (125 mL/Hr) IV Continuous <Continuous>  tiotropium 18 MICROgram(s) Capsule 1 Capsule(s) Inhalation daily    MEDICATIONS  (PRN):  heparin  Injectable 6500 Unit(s) IV Push every 6 hours PRN For aPTT less than 40  heparin  Injectable 3000 Unit(s) IV Push every 6 hours PRN For aPTT between 40 - 57      Vital Signs Last 24 Hrs  T(C): 37 (11 Apr 2020 04:00), Max: 37 (11 Apr 2020 04:00)  T(F): 98.6 (11 Apr 2020 04:00), Max: 98.6 (11 Apr 2020 04:00)  HR: 102 (11 Apr 2020 04:00) (82 - 102)  BP: 118/76 (11 Apr 2020 04:00) (114/81 - 160/76)  BP(mean): --  RR: 16 (11 Apr 2020 04:00) (16 - 18)  SpO2: 94% (10 Apr 2020 21:14) (93% - 94%)    Detailed neuro exam:    Mental status: Awake and alert.  Diminished attention She is oriented to self only, follows simple commands    Cranial nerves:  Pupils react symmetrically to light. She blinks to bilateral threat to confrontation. she does not participate with EOM testing.  Facial musculature is symmetric. Unable to assess palate or tongue    Motor/Sensory: There is normal bulk and tone.  Symmetric movement to stimuli although diffusely weak    Reflexes: 1+ throughout and plantar responses are flexor.    Cerebellar: Unable to assess, does not participate with exam    LABS:                           6.8    30.68 )-----------( 294      ( 11 Apr 2020 09:09 )             21.5     04-11    143  |  96<L>  |  61.0<H>  ----------------------------<  132<H>  3.8   |  28.0  |  4.27<H>    Ca    8.8      11 Apr 2020 09:09  Phos  6.0     04-11  Mg     1.7     04-11        PT/INR - ( 11 Apr 2020 09:09 )   PT: 16.0 sec;   INR: 1.40 ratio         PTT - ( 11 Apr 2020 09:09 )  PTT:82.4 sec    RADIOLOGY   CT head no acute CVA, mass or blood

## 2020-04-11 NOTE — PROGRESS NOTE ADULT - SUBJECTIVE AND OBJECTIVE BOX
[facility]  INFECTIOUS DISEASES AND INTERNAL MEDICINE at [place]  =======================================================  Rafal East MD  Diplomates American Board of Internal Medicine and Infectious Diseases  =======================================================    [id number]  [facility]     Follow Up: Leukocytosis     WBC is coming down, UA showed some WBC in urine but neg Nitrate.   Abdominal pain has improved as per her.     PAST MEDICAL & SURGICAL HISTORY:  Lung nodule  COPD (chronic obstructive pulmonary disease)  Hyperlipidemia  Hypothyroid  Chronic knee pain  S/P hysterectomy  S/P  section    Social Hx: smoker but no ETOH or drugs    FAMILY HISTORY:  No pertinent family history in first degree relatives    Allergies  No Known Allergies    Antibiotics:  None     REVIEW OF SYSTEMS:  CONSTITUTIONAL:  No Fever or chills  HEENT:  No diplopia or blurred vision.  No sore throat or runny nose.  CARDIOVASCULAR:  No chest pain or SOB.  RESPIRATORY:  No cough, shortness of breath, PND or orthopnea.  GASTROINTESTINAL:  No nausea, vomiting, +diarrhea, + abdominal pain   GENITOURINARY:  No dysuria, frequency or urgency. No Blood in urine  MUSCULOSKELETAL:  no joint aches, no muscle pain  SKIN:  No change in skin, hair or nails.  NEUROLOGIC:  No paresthesias, fasciculations, seizures or weakness.  PSYCHIATRIC:  No disorder of thought or mood.  ENDOCRINE:  No heat or cold intolerance, polyuria or polydipsia.  HEMATOLOGICAL:  No easy bruising or bleeding.     Physical Exam:  Vital Signs Last 24 Hrs  T(C): 37 (2020 04:00), Max: 37 (2020 04:00)  T(F): 98.6 (2020 04:00), Max: 98.6 (2020 04:00)  HR: 102 (2020 04:00) (82 - 102)  BP: 118/76 (2020 04:00) (114/81 - 160/76)  BP(mean): --  RR: 16 (2020 04:00) (16 - 18)  SpO2: 94% (10 Apr 2020 21:14) (93% - 94%)  GEN: NAD  HEENT: normocephalic and atraumatic. EOMI. PERRL.    NECK: Supple.  No lymphadenopathy   LUNGS: Clear to auscultation.  HEART: Regular rate and rhythm   ABDOMEN: Soft, nontender, and nondistended.  Positive bowel sounds.    : No CVA tenderness  EXTREMITIES: trace edema.  NEUROLOGIC: grossly intact.  PSYCHIATRIC: Appropriate affect .  SKIN: No rash     Labs:      143  |  96<L>  |  61.0<H>  ----------------------------<  132<H>  3.8   |  28.0  |  4.27<H>    Ca    8.8      2020 09:09  Phos  6.0       Mg     1.7                             6.8    30.68 )-----------( 294      ( 2020 09:09 )             21.5     PT/INR - ( 2020 09:09 )   PT: 16.0 sec;   INR: 1.40 ratio    PTT - ( 2020 09:09 )  PTT:82.4 sec  Urinalysis Basic - ( 10 Apr 2020 14:07 )    Color: Yellow / Appearance: Slightly Turbid / S.015 / pH: x  Gluc: x / Ketone: Negative  / Bili: Negative / Urobili: Negative mg/dL   Blood: x / Protein: 15 mg/dL / Nitrite: Negative   Leuk Esterase: Small / RBC: 25-50 /HPF / WBC 6-10   Sq Epi: x / Non Sq Epi: Occasional / Bacteria: Few    RECENT CULTURES:   @ 06:40 .Blood     No growth at 5 days.     @ 18:53 .Blood     No growth at 5 days.      All imaging and other data have been reviewed.    < from: Xray Chest 1 View-PORTABLE IMMEDIATE (20 @ 19:50) >   EXAM:  XR CHEST PORTABLE IMMED 1V                        PROCEDURE DATE:  2020    INTERPRETATION:  XR CHEST IMMEDIATE  Single AP view  HISTORY:  Shortness of breath  Comparison: Same day chest x-ray  The NG tube is in the stomach.  The cardiac silhouette is within normal limits. The lungs are clear. No pleural abnormality.  IMPRESSION: Clear lungs.    Assessment and Plan:   73y/o woman with COPD and hypothyrodism was admitted on 3/30 after being found AMS and attempted suicide. She had multiple fentanyl patches on her body. She was intubated and transferred to MICU at that time. She had neg Blood culture and CXR. Now she is extubated and transferred to medical floor. ID has been called for leukocytosis.     Leukocytosis   Diarrhea    - Blood culture on 3/30 and  negative  - UA negative   - CXR neg  - COVID negative on admission   - Procalcitonin 0.25  - Will send GI PCR and C diff  - IF negative will do abdominal CT  - Leukocytosis trending down 34k-->30k  - Afebrile, trend Tmax  - If hemodynamically unstable will cover with broad spectrum antibiotics empirically   - No antibiotics at this time.     Will follow.

## 2020-04-11 NOTE — PROCEDURE NOTE - NSINDICATIONS_GEN_A_CORE
monitoring purposes/arterial puncture to obtain ABG's/critical patient
venous access
dialysis/CRRT
critical illness/emergency venous access

## 2020-04-11 NOTE — PROCEDURE NOTE - NSPOSTCAREGUIDE_GEN_A_CORE
Verbal/written post procedure instructions were given to patient/caregiver

## 2020-04-11 NOTE — PROGRESS NOTE ADULT - ASSESSMENT
The patient is a 74y Female with seizure in the setting of intentional overdose.     Seizure.   completed keppra taper  no further seizure  If any further seizure would consider Vimpat or valproic acid.     Encephalopathy.   Likely toxic-metabolic.     Intentional overdose.   Followed by the behavioral health service.     There is no further neurologic workup suggested at this time.  We will be available for reconsultation as needed.    Thank you.   Curtis Hdez MD, PhD  [phone]

## 2020-04-11 NOTE — PROGRESS NOTE ADULT - SUBJECTIVE AND OBJECTIVE BOX
Patient is a 74y old  Female who presents with a chief complaint of Resp failure and seizure (2020 12:05)      Patient seen and examined at bedside. spoke to patient son and daughter on phone [phone] advised decrease in h and h and need for transfusion to which they agreed. hemodialysis today.     ALLERGIES:  No Known Allergies    MEDICATIONS  (STANDING):  ALBUTerol    90 MICROgram(s) HFA Inhaler 2 Puff(s) Inhalation every 6 hours  budesonide 160 MICROgram(s)/formoterol 4.5 MICROgram(s) Inhaler 2 Puff(s) Inhalation two times a day  epoetin-fabian-epbx (RETACRIT) Injectable 8000 Unit(s) SubCutaneous <User Schedule>  heparin  Infusion.  Unit(s)/Hr (14 mL/Hr) IV Continuous <Continuous>  levothyroxine Injectable 50 MICROGram(s) IV Push at bedtime  pantoprazole  Injectable 40 milliGRAM(s) IV Push daily  psyllium Powder 1 Packet(s) Oral two times a day  sodium bicarbonate  Infusion 0.117 mEq/kG/Hr (125 mL/Hr) IV Continuous <Continuous>  tiotropium 18 MICROgram(s) Capsule 1 Capsule(s) Inhalation daily    MEDICATIONS  (PRN):  heparin  Injectable 6500 Unit(s) IV Push every 6 hours PRN For aPTT less than 40  heparin  Injectable 3000 Unit(s) IV Push every 6 hours PRN For aPTT between 40 - 57    Vital Signs Last 24 Hrs  T(F): 98.6 (2020 04:00), Max: 98.6 (2020 04:00)  HR: 102 (2020 04:00) (82 - 102)  BP: 118/76 (2020 04:00) (114/81 - 160/76)  RR: 16 (2020 04:00) (16 - 18)  SpO2: 94% (10 Apr 2020 21:14) (93% - 94%)  I&O's Summary    10 Apr 2020 07:01  -  2020 07:00  --------------------------------------------------------  IN: 1157 mL / OUT: 575 mL / NET: 582 mL      PHYSICAL EXAM:  General: NAD  ENT: MMM, no thrush  Neck: Supple, No JVD  Lungs: Clear to auscultation bilaterally, good air entry, non-labored breathing  Cardio: +S1/S2, No pitting edema  Abdomen: Soft, Nontender, Nondistended; Bowel sounds present  Extremities: No calf tenderness    LABS:                        6.8    30.68 )-----------( 294      ( 2020 09:09 )             21.5         143  |  96  |  61.0  ----------------------------<  132  3.8   |  28.0  |  4.27    Ca    8.8      2020 09:09  Phos  6.0       Mg     1.7         TPro  6.2  /  Alb  3.0  /  TBili  0.3  /  DBili  x   /  AST  36  /  ALT  101  /  AlkPhos  92          eGFR if Non African American: 10 mL/min/1.73M2 (20 @ 09:09)  eGFR if : 11 mL/min/1.73M2 (20 @ 09:09)    PT/INR - ( 2020 09:09 )   PT: 16.0 sec;   INR: 1.40 ratio         PTT - ( 2020 09:09 )  PTT:82.4 sec    Procalcitonin, Serum: 0.25 ng/mL (04-10-20 @ 13:12)    04- Chol 106 mg/dL LDL 27 mg/dL HDL 31 mg/dL Trig 241 mg/dL  TSH 10.92   TSH with FT4 reflex --  Total T3 --    04 [id number] 5.7    Urinalysis Basic - ( 10 Apr 2020 14:07 )  Color: Yellow / Appearance: Slightly Turbid / S.015 / pH: x  Gluc: x / Ketone: Negative  / Bili: Negative / Urobili: Negative mg/dL   Blood: x / Protein: 15 mg/dL / Nitrite: Negative   Leuk Esterase: Small / RBC: 25-50 /HPF / WBC 6-10   Sq Epi: x / Non Sq Epi: Occasional / Bacteria: Few    RADIOLOGY & ADDITIONAL TESTS:  - no new tests

## 2020-04-11 NOTE — PROGRESS NOTE ADULT - ASSESSMENT
Patient is 74 year old female with PMH of COPD, lung nodule s/p wedge resection, and Hypothyroid who was BIBEMS with altered mental status. Admitted for suicidal attempt 2/2 opoid overdose (fentanyl patches) and subsequent withdrawal seizure complicated by hypoxic respiratory failure requiring intubation. Patient was successfully extubated and transferred to the hospitalist service for further management.    #Acute Hypoxic Respiratory Failure 2/2 opoid overdose (fentanyl patch)  - s/p intubation; now extubated  - 02 prn  - underlying history of COPD  - repeat CXR - clear lungs  - COVID negative  - C/w albuterol/ipratropium inhaler  - pulm consult appreciated     #Leukocytosis  - improving today  - ID consult appreciated- no abx for now   - monitor cultures  - cdiff pending     #Dysphagia  - s/p ngt   - passed bedside swallow evaluation; start dysphagia diet    #Suicidal ideation  - continue constant observation  - previously discussed with Dr. Carlos; premeditated suicide attempt (copy of suicide letter in chart)  - needs inpatient psych admission at [facility] once medically cleared    #Seizure disorder  - s/p Ativan in the ED with good response  - Keppra started for seizures prevention; now tapered off  - No further seizure activity  - Neuro recommendations appreciated     #B/l UE DVT  - heparin gtt  - once completed hd can start noac vs coumadin once cleared by nephro    #Anemia  - likely anemia of kidney disease  - on EPO  - occult blood negative  - monitor h and h   - transfuse 2 units prbc today (consent obtained from son and daughter and placed in chart)    #SUSAN due to ATN s/p HD  - Now in the polyuric phase; stop diuretics  - s/p ivf  - hd per nephro   - Monitor I/O, daily weights  - Avoid nephrotoxic medications  - Bailey in place  - nephro consult appreciated     #Hypothyroidism  - TSH elevated pending T3/T4  - synthroid     #DVT prophylaxis  - heparin GTT    Dispo - inpatient psych once medically cleared Patient is 74 year old female with PMH of COPD, lung nodule s/p wedge resection, and Hypothyroid who was BIBEMS with altered mental status. Admitted for suicidal attempt 2/2 opoid overdose (fentanyl patches) and subsequent withdrawal seizure complicated by hypoxic respiratory failure requiring intubation. Patient was successfully extubated and transferred to the hospitalist service for further management.    #Acute Hypoxic Respiratory Failure 2/2 opoid overdose (fentanyl patch) w/ metabolic encephalopathy  - s/p intubation; now extubated  - 02 prn  - underlying history of COPD  - repeat CXR - clear lungs  - COVID negative  - C/w albuterol/ipratropium inhaler  - pulm consult appreciated     #Leukocytosis  - improving today  - ID consult appreciated- no abx for now   - monitor cultures  - cdiff pending     #Dysphagia  - s/p ngt   - passed bedside swallow evaluation; start dysphagia diet    #Suicidal ideation  - continue constant observation  - previously discussed with Dr. Carlos; premeditated suicide attempt (copy of suicide letter in chart)  - needs inpatient psych admission at [facility] once medically cleared    #Seizure disorder  - s/p Ativan in the ED with good response  - Keppra started for seizures prevention; now tapered off  - No further seizure activity  - Neuro recommendations appreciated     #B/l UE DVT  - heparin gtt  - once completed hd can start noac vs coumadin once cleared by nephro    #Anemia  - likely anemia of kidney disease  - on EPO  - occult blood negative  - monitor h and h   - transfuse 2 units prbc today (consent obtained from son and daughter and placed in chart)    #SUSAN due to ATN s/p HD  - Now in the polyuric phase; stop diuretics  - s/p ivf  - hd per nephro   - Monitor I/O, daily weights  - Avoid nephrotoxic medications  - Bailey in place  - nephro consult appreciated     #Hypothyroidism  - TSH elevated pending T3/T4  - synthroid     #DVT prophylaxis  - heparin GTT    Dispo - inpatient psych once medically cleared

## 2020-04-12 VITALS
SYSTOLIC BLOOD PRESSURE: 104 MMHG | OXYGEN SATURATION: 88 % | RESPIRATION RATE: 32 BRPM | DIASTOLIC BLOOD PRESSURE: 51 MMHG | HEART RATE: 117 BPM

## 2020-04-12 LAB — GLUCOSE BLDC GLUCOMTR-MCNC: 191 MG/DL — HIGH (ref 70–99)

## 2020-04-12 PROCEDURE — 94002 VENT MGMT INPAT INIT DAY: CPT

## 2020-04-12 PROCEDURE — 36430 TRANSFUSION BLD/BLD COMPNT: CPT

## 2020-04-12 PROCEDURE — 80076 HEPATIC FUNCTION PANEL: CPT

## 2020-04-12 PROCEDURE — 93970 EXTREMITY STUDY: CPT

## 2020-04-12 PROCEDURE — 83540 ASSAY OF IRON: CPT

## 2020-04-12 PROCEDURE — 82728 ASSAY OF FERRITIN: CPT

## 2020-04-12 PROCEDURE — 87631 RESP VIRUS 3-5 TARGETS: CPT

## 2020-04-12 PROCEDURE — 87040 BLOOD CULTURE FOR BACTERIA: CPT

## 2020-04-12 PROCEDURE — 87493 C DIFF AMPLIFIED PROBE: CPT

## 2020-04-12 PROCEDURE — 84436 ASSAY OF TOTAL THYROXINE: CPT

## 2020-04-12 PROCEDURE — 86901 BLOOD TYPING SEROLOGIC RH(D): CPT

## 2020-04-12 PROCEDURE — 71045 X-RAY EXAM CHEST 1 VIEW: CPT | Mod: 26

## 2020-04-12 PROCEDURE — 87340 HEPATITIS B SURFACE AG IA: CPT

## 2020-04-12 PROCEDURE — 85027 COMPLETE CBC AUTOMATED: CPT

## 2020-04-12 PROCEDURE — 83550 IRON BINDING TEST: CPT

## 2020-04-12 PROCEDURE — 82140 ASSAY OF AMMONIA: CPT

## 2020-04-12 PROCEDURE — 94003 VENT MGMT INPAT SUBQ DAY: CPT

## 2020-04-12 PROCEDURE — 84132 ASSAY OF SERUM POTASSIUM: CPT

## 2020-04-12 PROCEDURE — 94760 N-INVAS EAR/PLS OXIMETRY 1: CPT

## 2020-04-12 PROCEDURE — 99291 CRITICAL CARE FIRST HOUR: CPT | Mod: 25

## 2020-04-12 PROCEDURE — 99261: CPT

## 2020-04-12 PROCEDURE — P9016: CPT

## 2020-04-12 PROCEDURE — 43753 TX GASTRO INTUB W/ASP: CPT

## 2020-04-12 PROCEDURE — 86803 HEPATITIS C AB TEST: CPT

## 2020-04-12 PROCEDURE — 97163 PT EVAL HIGH COMPLEX 45 MIN: CPT

## 2020-04-12 PROCEDURE — 82803 BLOOD GASES ANY COMBINATION: CPT

## 2020-04-12 PROCEDURE — 86140 C-REACTIVE PROTEIN: CPT

## 2020-04-12 PROCEDURE — 86900 BLOOD TYPING SEROLOGIC ABO: CPT

## 2020-04-12 PROCEDURE — 82947 ASSAY GLUCOSE BLOOD QUANT: CPT

## 2020-04-12 PROCEDURE — 85652 RBC SED RATE AUTOMATED: CPT

## 2020-04-12 PROCEDURE — 80053 COMPREHEN METABOLIC PANEL: CPT

## 2020-04-12 PROCEDURE — 85610 PROTHROMBIN TIME: CPT

## 2020-04-12 PROCEDURE — 82330 ASSAY OF CALCIUM: CPT

## 2020-04-12 PROCEDURE — 81001 URINALYSIS AUTO W/SCOPE: CPT

## 2020-04-12 PROCEDURE — 84295 ASSAY OF SERUM SODIUM: CPT

## 2020-04-12 PROCEDURE — 80177 DRUG SCRN QUAN LEVETIRACETAM: CPT

## 2020-04-12 PROCEDURE — 92610 EVALUATE SWALLOWING FUNCTION: CPT

## 2020-04-12 PROCEDURE — 85730 THROMBOPLASTIN TIME PARTIAL: CPT

## 2020-04-12 PROCEDURE — 82272 OCCULT BLD FECES 1-3 TESTS: CPT

## 2020-04-12 PROCEDURE — 86780 TREPONEMA PALLIDUM: CPT

## 2020-04-12 PROCEDURE — 87635 SARS-COV-2 COVID-19 AMP PRB: CPT

## 2020-04-12 PROCEDURE — 83615 LACTATE (LD) (LDH) ENZYME: CPT

## 2020-04-12 PROCEDURE — 86923 COMPATIBILITY TEST ELECTRIC: CPT

## 2020-04-12 PROCEDURE — 83036 HEMOGLOBIN GLYCOSYLATED A1C: CPT

## 2020-04-12 PROCEDURE — 82746 ASSAY OF FOLIC ACID SERUM: CPT

## 2020-04-12 PROCEDURE — 82962 GLUCOSE BLOOD TEST: CPT

## 2020-04-12 PROCEDURE — 80061 LIPID PANEL: CPT

## 2020-04-12 PROCEDURE — 80307 DRUG TEST PRSMV CHEM ANLYZR: CPT

## 2020-04-12 PROCEDURE — 94640 AIRWAY INHALATION TREATMENT: CPT

## 2020-04-12 PROCEDURE — 84443 ASSAY THYROID STIM HORMONE: CPT

## 2020-04-12 PROCEDURE — 96374 THER/PROPH/DIAG INJ IV PUSH: CPT | Mod: XU

## 2020-04-12 PROCEDURE — 36415 COLL VENOUS BLD VENIPUNCTURE: CPT

## 2020-04-12 PROCEDURE — 86850 RBC ANTIBODY SCREEN: CPT

## 2020-04-12 PROCEDURE — 82435 ASSAY OF BLOOD CHLORIDE: CPT

## 2020-04-12 PROCEDURE — 84100 ASSAY OF PHOSPHORUS: CPT

## 2020-04-12 PROCEDURE — 96375 TX/PRO/DX INJ NEW DRUG ADDON: CPT | Mod: XU

## 2020-04-12 PROCEDURE — 31500 INSERT EMERGENCY AIRWAY: CPT

## 2020-04-12 PROCEDURE — 71045 X-RAY EXAM CHEST 1 VIEW: CPT

## 2020-04-12 PROCEDURE — 85014 HEMATOCRIT: CPT

## 2020-04-12 PROCEDURE — 99233 SBSQ HOSP IP/OBS HIGH 50: CPT

## 2020-04-12 PROCEDURE — 82607 VITAMIN B-12: CPT

## 2020-04-12 PROCEDURE — 84466 ASSAY OF TRANSFERRIN: CPT

## 2020-04-12 PROCEDURE — 83735 ASSAY OF MAGNESIUM: CPT

## 2020-04-12 PROCEDURE — 85379 FIBRIN DEGRADATION QUANT: CPT

## 2020-04-12 PROCEDURE — 83605 ASSAY OF LACTIC ACID: CPT

## 2020-04-12 PROCEDURE — 70450 CT HEAD/BRAIN W/O DYE: CPT

## 2020-04-12 PROCEDURE — 12345: CPT | Mod: NC

## 2020-04-12 PROCEDURE — 86703 HIV-1/HIV-2 1 RESULT ANTBDY: CPT

## 2020-04-12 PROCEDURE — 84145 PROCALCITONIN (PCT): CPT

## 2020-04-12 PROCEDURE — 80048 BASIC METABOLIC PNL TOTAL CA: CPT

## 2020-04-12 PROCEDURE — 84481 FREE ASSAY (FT-3): CPT

## 2020-04-12 PROCEDURE — 84439 ASSAY OF FREE THYROXINE: CPT

## 2020-04-12 PROCEDURE — 93005 ELECTROCARDIOGRAM TRACING: CPT

## 2020-04-12 PROCEDURE — 99291 CRITICAL CARE FIRST HOUR: CPT

## 2020-04-12 PROCEDURE — 86706 HEP B SURFACE ANTIBODY: CPT

## 2020-04-12 RX ORDER — ROBINUL 0.2 MG/ML
0.4 INJECTION INTRAMUSCULAR; INTRAVENOUS EVERY 8 HOURS
Refills: 0 | Status: DISCONTINUED | OUTPATIENT
Start: 2020-04-12 | End: 2020-04-12

## 2020-04-12 RX ORDER — FUROSEMIDE 40 MG
40 TABLET ORAL DAILY
Refills: 0 | Status: DISCONTINUED | OUTPATIENT
Start: 2020-04-12 | End: 2020-04-12

## 2020-04-12 RX ORDER — MORPHINE SULFATE 50 MG/1
1 CAPSULE, EXTENDED RELEASE ORAL
Qty: 100 | Refills: 0 | Status: DISCONTINUED | OUTPATIENT
Start: 2020-04-12 | End: 2020-04-12

## 2020-04-12 RX ORDER — FUROSEMIDE 40 MG
80 TABLET ORAL ONCE
Refills: 0 | Status: COMPLETED | OUTPATIENT
Start: 2020-04-12 | End: 2020-04-12

## 2020-04-12 RX ADMIN — Medication 1 MILLIGRAM(S): at 06:49

## 2020-04-12 RX ADMIN — Medication 80 MILLIGRAM(S): at 03:56

## 2020-04-12 RX ADMIN — MORPHINE SULFATE 1 MG/HR: 50 CAPSULE, EXTENDED RELEASE ORAL at 10:19

## 2020-04-12 RX ADMIN — ROBINUL 0.4 MILLIGRAM(S): 0.2 INJECTION INTRAMUSCULAR; INTRAVENOUS at 10:19

## 2020-04-12 NOTE — PROGRESS NOTE ADULT - SUBJECTIVE AND OBJECTIVE BOX
Patient is a 74y old  Female who presents with a chief complaint of Resp failure and seizure (2020 15:50)    Patient seen and examined at bedside.     ALLERGIES:  No Known Allergies    MEDICATIONS  (STANDING):  glycopyrrolate Injectable 0.4 milliGRAM(s) IV Push every 8 hours  morphine  Infusion. 1 mG/Hr (1 mL/Hr) IV Continuous <Continuous>    MEDICATIONS  (PRN):    Vital Signs Last 24 Hrs  T(F): 98.4 (2020 01:15), Max: 98.5 (2020 21:57)  HR: 103 (:15) (96 - 108)  BP: 138/80 (:15) (108/63 - 139/78)  RR: 20 (:15) (18 - 20)  SpO2: 93% (2020 01:15) (93% - 95%)  I&O's Summary    PHYSICAL EXAM:  General: lethargic   ENT: MMM, no thrush  Neck: Supple, No JVD  Lungs: decreased breath sounds b/l  Cardio: +S1/S2, No pitting edema  Abdomen: Soft, Nontender, Nondistended; Bowel sounds present  Extremities: No calf tenderness    LABS:                        6.8    30.68 )-----------( 294      ( 2020 09:09 )             21.5         143  |  96  |  61.0  ----------------------------<  132  3.8   |  28.0  |  4.27    Ca    8.8      2020 09:09  Phos  6.0       Mg     1.7         eGFR if Non African American: 10 mL/min/1.73M2 (20 @ 09:09)  eGFR if : 11 mL/min/1.73M2 (20 @ 09:09)    PT/INR - ( 2020 09:09 )   PT: 16.0 sec;   INR: 1.40 ratio         PTT - ( 2020 09:09 )  PTT:82.4 sec    Procalcitonin, Serum: 0.25 ng/mL (04-10-20 @ 13:12)     Chol 106 mg/dL LDL 27 mg/dL HDL 31 mg/dL Trig 241 mg/dL  TSH 10.92   TSH with FT4 reflex --  Total T3 --    POCT Blood Glucose.: 191 mg/dL (2020 07:18)    04-03 [id number] 5.7    Urinalysis Basic - ( 10 Apr 2020 14:07 )  Color: Yellow / Appearance: Slightly Turbid / S.015 / pH: x  Gluc: x / Ketone: Negative  / Bili: Negative / Urobili: Negative mg/dL   Blood: x / Protein: 15 mg/dL / Nitrite: Negative   Leuk Esterase: Small / RBC: 25-50 /HPF / WBC 6-10   Sq Epi: x / Non Sq Epi: Occasional / Bacteria: Few    RADIOLOGY & ADDITIONAL TESTS:  no new tests     Care Discussed with Consultants/Other Providers:   Rapid Response Team

## 2020-04-12 NOTE — PROGRESS NOTE ADULT - SUBJECTIVE AND OBJECTIVE BOX
[facility] DIVISION OF KIDNEY DISEASES AND HYPERTENSION -- FOLLOW UP NOTE  --------------------------------------------------------------------------------  Chief Complaint:  SUSAN requiring HD    24 hour events/subjective:  HD stopped  Last HD 4/9/2020  Renal recovery       PAST HISTORY  --------------------------------------------------------------------------------  No significant changes to PMH, PSH, FHx, SHx, unless otherwise noted    ALLERGIES & MEDICATIONS  --------------------------------------------------------------------------------  Allergies    No Known Allergies    Intolerances      Standing Inpatient Medications  glycopyrrolate Injectable 0.4 milliGRAM(s) IV Push every 8 hours  morphine  Infusion. 1 mG/Hr IV Continuous <Continuous>    PRN Inpatient Medications      REVIEW OF SYSTEMS  --------------------------------------------------------------------------------  Gen: No weight changes, fatigue, fevers/chills, weakness  Skin: No rashes  Head/Eyes/Ears/Mouth: No headache; Normal hearing; Normal vision w/o blurriness; No sinus pain/discomfort, sore throat  Respiratory: No dyspnea, cough, wheezing, hemoptysis  CV: No chest pain, PND, orthopnea  GI: No abdominal pain, diarrhea, constipation, nausea, vomiting, melena, hematochezia  : No increased frequency, dysuria, hematuria, nocturia  MSK: No joint pain/swelling; no back pain; no edema  Neuro: No dizziness/lightheadedness, weakness, seizures, numbness, tingling  Heme: No easy bruising or bleeding  Endo: No heat/cold intolerance  Psych: No significant nervousness, anxiety, stress, depression    All other systems were reviewed and are negative, except as noted.    VITALS/PHYSICAL EXAM  --------------------------------------------------------------------------------  T(C): 36.9 (04-12-20 @ 01:15), Max: 36.9 (04-11-20 @ 21:57)  HR: 117 (04-12-20 @ 07:22) (96 - 117)  BP: 104/51 (04-12-20 @ 07:22) (104/51 - 139/78)  RR: 32 (04-12-20 @ 07:22) (18 - 32)  SpO2: 88% (04-12-20 @ 07:22) (88% - 95%)  Wt(kg): --        Physical Exam:  	Gen: NAD, well-appearing  	HEENT: supple neck  	Pulm: CTA B/L  	CV: RRR, S1S2; no rub  	Abd: +BS, soft, nontender  	UE: Warm, no asterixis  	LE: Warm, + edema  	Neuro: No focal deficits  	Psych: Normal affect and mood      LABS/STUDIES  --------------------------------------------------------------------------------              6.8    30.68 >-----------<  294      [04-11-20 @ 09:09]              21.5     143  |  96  |  61.0  ----------------------------<  132      [04-11-20 @ 09:09]  3.8   |  28.0  |  4.27        Ca     8.8     [04-11-20 @ 09:09]      Mg     1.7     [04-11-20 @ 09:09]      Phos  6.0     [04-11-20 @ 09:09]      PT/INR: PT 16.0 , INR 1.40       [04-11-20 @ 09:09]  PTT: 82.4       [04-11-20 @ 09:09]      Creatinine Trend:  SCr 4.27 [04-11 @ 09:09]  SCr 3.82 [04-10 @ 13:12]  SCr 4.03 [04-10 @ 09:30]  SCr 4.06 [04-09 @ 06:10]  SCr 5.51 [04-08 @ 08:57]    Urinalysis - [04-10-20 @ 14:07]      Color Yellow / Appearance Slightly Turbid / SG 1.015 / pH 5.0      Gluc Negative / Ketone Negative  / Bili Negative / Urobili Negative       Blood Large / Protein 15 / Leuk Est Small / Nitrite Negative      RBC 25-50 / WBC 6-10 / Hyaline  / Gran  / Sq Epi  / Non Sq Epi Occasional / Bacteria Few      Iron 30, TIBC 269, %sat 11      [04-11-20 @ 09:09]  Ferritin 277      [04-11-20 @ 09:09]  HbA1c 5.7      [04-03-20 @ 03:44]  TSH 10.92      [04-09-20 @ 21:53]  Lipid: chol 106, , HDL 31, LDL 27      [04-01-20 @ 02:49]    HBsAb Nonreact      [04-01-20 @ 07:33]  HBsAg Nonreact      [04-01-20 @ 07:33]  HCV 0.18, Nonreact      [04-01-20 @ 07:33]  HIV Nonreact      [04-01-20 @ 02:49]    Syphilis Screen (Treponema Pallidum Ab) Negative      [04-10-20 @ 16:03]

## 2020-04-12 NOTE — CHART NOTE - NSCHARTNOTEFT_GEN_A_CORE
responded ruby's bedisde for rapid response in ED/CDU; patient with lengthy hospital admission s/p suicide attempt and resp failure  notes from pall care,hospitalist, and chart removed, has molst for dnr/dni and comfort measures  on assessment, patient delirious not responding, guppy breathing, maintains sats 100% on NRB  Finger stick normal  HR and BP normal  Patient with coffee ground emesis; hep gtt stopped;  D/W hospitoalist, based on review of chart, patients condition, worsening lab values, and MOLST orders, will not acutely intervene  Recommend contact with family to discuss comfort care.  Care resumed by hospitalist

## 2020-04-12 NOTE — CHART NOTE - NSCHARTNOTEFT_GEN_A_CORE
Rapid response called for hypoxia. Patient seen and examined at bed side. On chest auscultation, diffuse crackles b/l. CXR done. Lasix 80mg once given. Patient placed on NC and NRB, sat improved. 2nd unit of PRBC stopped. Patient transferred to CDU for . Follow up in AM.

## 2020-04-12 NOTE — RAPID RESPONSE TEAM SUMMARY - NSSITUATIONBACKGROUNDRRT_GEN_ALL_CORE
Patient is 74 year old female with PMH of COPD, lung nodule s/p wedge resection, and Hypothyroid who was BIBEMS with altered mental status. Admitted for suicidal attempt 2/2 opoid overdose (fentanyl patches) and subsequent withdrawal seizure complicated by hypoxic respiratory failure requiring intubation. Patient was successfully extubated and transferred to the hospitalist service for further management.  RRT called from AMS, vitals stable, coffee ground emesis noted in patients mouth.  Upon RRT arrival, patient found in bed in a NRB mask, unresponsive   Of note, RR called earlier this morning, noted to dropped her Hb down 6.8; 3 PRBC's were attempted, however due con concern of fluid overload, transfusion were stopped and Lasix 80mg x1 were given.   Dr. Gutierrez ED attending at bedside running rapid.  Dr. Hernandez attending of file at bedside, will contact patients family, to discuss comfort measures, patient has already filled a MOLTS form. Patient is 74 year old female with PMH of COPD, lung nodule s/p wedge resection, and Hypothyroid who was BIBEMS with altered mental status. Admitted for suicidal attempt 2/2 opoid overdose (fentanyl patches) and subsequent withdrawal seizure complicated by hypoxic respiratory failure requiring intubation. Patient was successfully extubated and transferred to the hospitalist service for further management.  RRT called from AMS, vitals stable, coffee ground emesis noted in patients mouth.  Upon RRT arrival, patient found in bed in a NRB mask, unresponsive   Of note, RR called earlier this morning, noted to dropped her Hb down 6.8; 3 PRBC's were attempted, however due con concern of fluid overload, transfusion were stopped and Lasix 80mg x1 was given.   Dr. Gutierrez ED attending at bedside running rapid.  Dr. Hernandez attending of file at bedside, will contact patients family, to discuss comfort measures, patient has already filled a MOLTS form.

## 2020-04-12 NOTE — PROGRESS NOTE ADULT - ASSESSMENT
Patient is 74 year old female with PMH of COPD, lung nodule s/p wedge resection, and Hypothyroid who was BIBEMS with altered mental status. Admitted for suicidal attempt 2/2 opoid overdose (fentanyl patches) and subsequent withdrawal seizure complicated by hypoxic respiratory failure requiring intubation. Patient was successfully extubated and transferred to the hospitalist service for further management. Patient now more lethargic. (patient now with acute renal failure, acute hypoxic respiratory failure, lethargy- patient actively dying)    patient s/p rapid response for change in mental status in AM.     d/w family on phone [phone] Son Tomy and daughter Mayra to review MOLST orders which state start comfort measures if patient actively dying to which they agree. THey will come see mother today d/w ED Manager Yadira.    #Acute Hypoxic Respiratory Failure 2/2 opoid overdose (fentanyl patch) w/ metabolic encephalopathy   #Leukocytosis  #Dysphagia  #Suicidal ideation  #Seizure disorder  #B/l UE DVT  #Anemia  #SUSAN due to ATN s/p HD  #Hypothyroidism  - given patient actively dying desision made w/ family for full comfort care, stop heparin gtt, stop other medications, stop blood draws, start morphine drip, start glycopyrollate, stop invasive testing.    55 minutes of critical care time spent on patient Patient is 74 year old female with PMH of COPD, lung nodule s/p wedge resection, and Hypothyroid who was BIBEMS with altered mental status. Admitted for suicidal attempt 2/2 opoid overdose (fentanyl patches) and subsequent withdrawal seizure complicated by hypoxic respiratory failure requiring intubation. Patient was successfully extubated and transferred to the hospitalist service for further management. Patient now more lethargic. (patient now with acute renal failure, acute hypoxic respiratory failure, lethargy- patient actively dying)    patient s/p rapid response for change in mental status in AM. (previous to that was another rapid response for hypoxia overnight)    d/w family on phone [phone] Son Tomy and daughter Mayra to review MOLST orders which state start comfort measures if patient actively dying to which they agree. THey will come see mother today d/w ED Manager Yadira.    #Acute Hypoxic Respiratory Failure 2/2 opoid overdose (fentanyl patch) w/ metabolic encephalopathy   #Leukocytosis  #Dysphagia  #Suicidal ideation  #Seizure disorder  #B/l UE DVT  #Anemia  #SUSAN due to ATN s/p HD  #Hypothyroidism  - given patient actively dying desision made w/ family for full comfort care, stop heparin gtt, stop other medications, stop blood draws, start morphine drip, start glycopyrollate, stop invasive testing.    55 minutes of critical care time spent on patient

## 2020-04-12 NOTE — PROGRESS NOTE ADULT - ASSESSMENT
1. SUSAN  2. COPD Exacerbation  3. Leukocytosis    SUSAN resolving  Stopped HD  Monitor and trend renal function, UOP  Continue current management

## 2020-04-12 NOTE — PROGRESS NOTE ADULT - REASON FOR ADMISSION
Resp failure and seizure
Resp failure and seizure s/p Fentanyl overdose
Resp failure and seizure

## 2020-04-12 NOTE — RAPID RESPONSE TEAM SUMMARY - NSSITUATIONBACKGROUNDRRT_GEN_ALL_CORE
74F current smoker w/ PMHx COPD, lung nodule s/p wedge resection, hypothyroid w/ CC of AMS. The patient had a rapid response due to hypoxia on 6 liters Nasal canula.

## 2020-04-12 NOTE — DISCHARGE NOTE FOR THE EXPIRED PATIENT - HOSPITAL COURSE
Patient is 74 year old female with PMH of COPD, lung nodule s/p wedge resection, and Hypothyroid who was BIBEMS with altered mental status. Admitted for suicidal attempt 2/2 opoid overdose (fentanyl patches) and subsequent withdrawal seizure complicated by hypoxic respiratory failure requiring intubation. Patient was successfully extubated and transferred to the hospitalist service for further management. Patient subsequently given HD for renal failure. patient hemoglobin found to be decreased and prbc transfused. patient subsequently worsened w/ desaturation and then rapid response called and patient placed on . patient then w/ altered mental status in am determined to be in acute hypoxic respiratory failure and acute renal failure decision made to call family for further goals of care. decision made by family to start comfort care only. Patient is 74 year old female with PMH of COPD, lung nodule s/p wedge resection, and Hypothyroid who was BIBEMS with altered mental status. Admitted for suicidal attempt 2/2 opoid overdose (fentanyl patches) and subsequent withdrawal seizure complicated by hypoxic respiratory failure requiring intubation. Patient was successfully extubated and transferred to the hospitalist service for further management. Patient subsequently given HD for renal failure. patient hemoglobin found to be decreased and prbc transfused. patient subsequently worsened w/ desaturation and then rapid response called and patient placed on . patient then w/ altered mental status in am determined to be in acute hypoxic respiratory failure and acute renal failure decision made to call family for further goals of care. decision made by family to start comfort care only.    Contacted [facility] Case Accepted by ARTUR Laguerre- death certificate to be completed by ME   Cause of Death undetermined

## 2020-04-12 NOTE — PROVIDER CONTACT NOTE (OTHER) - SITUATION
need new order for PRBC since the first unit was returned when the patient had no IV access. the second bag was technically the first bag given and blood bank informed me I needed a new order to get

## 2020-08-28 ENCOUNTER — APPOINTMENT (OUTPATIENT)
Dept: PULMONOLOGY | Facility: CLINIC | Age: 75
End: 2020-08-28

## 2022-08-23 NOTE — PROVIDER CONTACT NOTE (CRITICAL VALUE NOTIFICATION) - TEST AND RESULT REPORTED:
From: Emma Duque  To: Kyle Hammond  Sent: 8/22/2022 9:36 PM CDT  Subject: Artificial. Tears Solution    This message is being sent by Krysten Duque on behalf of Emma Duque.    Hi Dr. Hammond  The label on bottle reads Artificial Tears OPH Sol 1.4  The RX # is [id number]   lactate 3.0

## 2022-09-27 NOTE — DISCHARGE NOTE ADULT - CARE PROVIDER_API CALL
oral Isai Chaudhary), Surgery; Thoracic Surgery  [address] [place] [address]  Phone: [phone]  Fax: [phone]

## 2022-12-07 NOTE — SWALLOW BEDSIDE ASSESSMENT ADULT - SWALLOW EVAL: THERAPY FREQUENCY
Goal Outcome Evaluation:  Plan of Care Reviewed With: patient, daughter        Progress: improving  Outcome Evaluation: Pt tolerated treatment with no complaints. pt's daughter present to translate. Pt is SBA with transfers. Pt ambulated 20ft without a walker and a little unsteady but pt did ambulate 150ft with rwx, CGA and was increasingly steady. No LOB. Pt needs front wheeled walker for home use at d/c. Rec Home with assist and  HHPT.    ..Patient was wearing a face mask during this therapy encounter. Therapist used appropriate personal protective equipment including eye protection, mask, and gloves.  Mask used was standard procedure mask. Appropriate PPE was worn during the entire therapy session. Hand hygiene was completed before and after therapy session. Patient is not in enhanced droplet precautions.      as schedule permits

## 2023-05-02 NOTE — ED ADULT NURSE NOTE - CAS TRG GEN SKIN COLOR
Anesthesia Post-op Note    Patient: Katy Diaz  Procedure(s) Performed: IR MISC DRAINAGE PROCEDURE  Anesthesia type: MAC    Vitals Value Taken Time   Temp 36.9 °C (98.4 °F) 05/02/23 1616   Pulse 80 05/02/23 1616   Resp 16 05/02/23 1530   SpO2 94 % 05/02/23 1616   /68 05/02/23 1616         Patient Location: bedside  Post-op Vital Signs:stable  Level of Consciousness: participates in exam, awake, alert, oriented and return to baseline  Respiratory Status: spontaneous ventilation and nasal cannula  Cardiovascular stable and blood pressure returned to baseline  Hydration: euvolemic  Pain Management: adequately controlled  Vomiting: none  Nausea: None  Airway Patency:patent  Post-op Assessment: awake, alert, appropriately conversant, or baseline, no complications, patient tolerated procedure well with no complications and no evidence of recall      There were no known notable events for this encounter.   Normal for race

## 2024-06-18 NOTE — ED PROCEDURE NOTE - CPROC ED TRACHE INTUB DETAIL1
Provider E-Visit time total (minutes): 11      
Patient connected to ventilator with settings as ordered.

## 2024-12-17 NOTE — ED ADULT NURSE NOTE - IN THE PAST 12 MONTHS HAVE YOU USED DRUGS OTHER THAN THOSE REQUIRED FOR MEDICAL REASON?
Patient still recommended by therapy for intense therapy 5x/week. With no insurance, patient will need jerry bed at an IRF. [facility] will not have jerry bed until January, but patient completing paperwork for jerry bed at [facility].     
Diabetes    Hyperlipidemia    Hypertension
No